# Patient Record
Sex: MALE | Race: WHITE | Employment: OTHER | URBAN - METROPOLITAN AREA
[De-identification: names, ages, dates, MRNs, and addresses within clinical notes are randomized per-mention and may not be internally consistent; named-entity substitution may affect disease eponyms.]

---

## 2018-01-29 ENCOUNTER — HOSPITAL ENCOUNTER (OUTPATIENT)
Dept: MRI IMAGING | Age: 83
Discharge: HOME | End: 2018-01-29
Attending: OTOLARYNGOLOGY
Payer: MEDICARE

## 2018-01-29 LAB — CREAT BLD-MCNC: 0.9 MG/DL (ref 0.8–1.5)

## 2018-01-29 PROCEDURE — 82565 ASSAY OF CREATININE: CPT

## 2018-01-29 PROCEDURE — 70553 MRI BRAIN STEM W/O & W/DYE: CPT

## 2018-01-29 PROCEDURE — A9577 INJ MULTIHANCE: HCPCS | Performed by: OTOLARYNGOLOGY

## 2018-01-29 PROCEDURE — 74011250636 HC RX REV CODE- 250/636: Performed by: OTOLARYNGOLOGY

## 2018-01-29 RX ORDER — SODIUM CHLORIDE 0.9 % (FLUSH) 0.9 %
10 SYRINGE (ML) INJECTION
Status: COMPLETED | OUTPATIENT
Start: 2018-01-29 | End: 2018-01-29

## 2018-01-29 RX ADMIN — GADOBENATE DIMEGLUMINE 15 ML: 529 INJECTION, SOLUTION INTRAVENOUS at 15:22

## 2018-01-29 RX ADMIN — Medication 10 ML: at 15:22

## 2020-09-01 ENCOUNTER — APPOINTMENT (RX ONLY)
Dept: URBAN - METROPOLITAN AREA CLINIC 349 | Facility: CLINIC | Age: 85
Setting detail: DERMATOLOGY
End: 2020-09-01

## 2020-09-01 DIAGNOSIS — D22 MELANOCYTIC NEVI: ICD-10-CM

## 2020-09-01 DIAGNOSIS — L81.4 OTHER MELANIN HYPERPIGMENTATION: ICD-10-CM

## 2020-09-01 PROBLEM — C44.219 BASAL CELL CARCINOMA OF SKIN OF LEFT EAR AND EXTERNAL AURICULAR CANAL: Status: ACTIVE | Noted: 2020-09-01

## 2020-09-01 PROBLEM — C44.529 SQUAMOUS CELL CARCINOMA OF SKIN OF OTHER PART OF TRUNK: Status: ACTIVE | Noted: 2020-09-01

## 2020-09-01 PROBLEM — D22.5 MELANOCYTIC NEVI OF TRUNK: Status: ACTIVE | Noted: 2020-09-01

## 2020-09-01 PROBLEM — D22.4 MELANOCYTIC NEVI OF SCALP AND NECK: Status: ACTIVE | Noted: 2020-09-01

## 2020-09-01 PROCEDURE — ? REFERRAL CORRESPONDENCE

## 2020-09-01 PROCEDURE — 99214 OFFICE O/P EST MOD 30 MIN: CPT | Mod: 25

## 2020-09-01 PROCEDURE — 17282 DSTR MAL LS F/E/E/N/L/M1.1-2: CPT

## 2020-09-01 PROCEDURE — 17263 DSTRJ MAL LES T/A/L 2.1-3.0: CPT

## 2020-09-01 PROCEDURE — ? COUNSELING

## 2020-09-01 PROCEDURE — ? PATHOLOGY BILLING

## 2020-09-01 PROCEDURE — ? CURETTAGE AND DESTRUCTION WITH PATHOLOGY

## 2020-09-01 PROCEDURE — 88305 TISSUE EXAM BY PATHOLOGIST: CPT

## 2020-09-01 PROCEDURE — A4550 SURGICAL TRAYS: HCPCS

## 2020-09-01 ASSESSMENT — LOCATION ZONE DERM
LOCATION ZONE: FACE
LOCATION ZONE: FACE
LOCATION ZONE: TRUNK
LOCATION ZONE: NECK
LOCATION ZONE: TRUNK

## 2020-09-01 ASSESSMENT — LOCATION SIMPLE DESCRIPTION DERM
LOCATION SIMPLE: LEFT FOREHEAD
LOCATION SIMPLE: RIGHT UPPER BACK
LOCATION SIMPLE: ABDOMEN
LOCATION SIMPLE: POSTERIOR NECK
LOCATION SIMPLE: LEFT UPPER BACK
LOCATION SIMPLE: CHEST
LOCATION SIMPLE: LEFT FOREHEAD
LOCATION SIMPLE: LEFT UPPER BACK

## 2020-09-01 ASSESSMENT — LOCATION DETAILED DESCRIPTION DERM
LOCATION DETAILED: LEFT SUPERIOR UPPER BACK
LOCATION DETAILED: LEFT FOREHEAD
LOCATION DETAILED: PERIUMBILICAL SKIN
LOCATION DETAILED: RIGHT POSTERIOR NECK
LOCATION DETAILED: LEFT SUPERIOR UPPER BACK
LOCATION DETAILED: LEFT FOREHEAD
LOCATION DETAILED: LEFT MEDIAL SUPERIOR CHEST
LOCATION DETAILED: RIGHT SUPERIOR UPPER BACK

## 2020-09-01 NOTE — PROCEDURE: CURETTAGE AND DESTRUCTION WITH PATHOLOGY
Size Of Lesion After Curettage: 2.2
Anesthesia Volume In Cc: 1.5
Bill For Surgical Tray: yes
Consent was obtained from the patient. The risks, benefits and alternatives to therapy were discussed in detail. Specifically, the risks of infection, scarring, bleeding, prolonged wound healing, nerve injury, incomplete removal, allergy to anesthesia and recurrence were addressed. Alternatives to ED&C, such as: surgical removal and XRT were also discussed.  Prior to the procedure, the treatment site was clearly identified and confirmed by the patient. All components of Universal Protocol/PAUSE Rule completed.
Bill As A Line Item Or As Units: Line Item
Size Of Lesion In Cm: 1.1
Detail Level: Detailed
Number Of Curettages: 3
What Was Performed First?: Curettage
Accession #: [email or web address]
Post-Care Instructions: I reviewed with the patient in detail post-care instructions. Patient is to keep the area dry for 48 hours, and not to engage in any swimming until the area is healed. Should the patient develop any fevers, chills, bleeding, severe pain patient will contact the office immediately.
Size Of Lesion In Cm: 2
Additional Information: (Optional): The wound was cleaned, and a pressure dressing was applied.  The patient received detailed post-op instructions.\\nAft the procedure, the patient was observed for 5-10 minutes and was oriented to person, place, and time.  Denied feeling dizzy, queasy, and stated that they did not feel that they were going to faint.
Bill 37035 For Specimen Handling/Conveyance To Laboratory?: no
Anesthesia Type: 1% lidocaine with epinephrine
Biopsy Type: H and E
Billing Type: Third-Party Bill
Cautery Type: hot cautery
Size Of Lesion After Curettage: 1.3
Histology Text: Following the procedure a portion of the curetted material was sent for histologic evaluation.

## 2020-09-01 NOTE — PROCEDURE: PATHOLOGY BILLING
Immunohistochemistry ([id number] and [id number]) billing is not performed here. Please use the Immunohistochemistry Stain Billing plan to accomplish this.

## 2020-09-01 NOTE — PROCEDURE: PATHOLOGY BILLING
Immunohistochemistry ([id number] and [id number]) billing is not performed here. Please use the Immunohistochemistry Stain Billing plan to accomplish this. Immunohistochemistry ([id number] and [id number]) billing is not performed here. Please use the Immunohistochemistry Stain Billing plan to accomplish this.

## 2020-10-13 ENCOUNTER — APPOINTMENT (RX ONLY)
Dept: URBAN - METROPOLITAN AREA CLINIC 349 | Facility: CLINIC | Age: 85
Setting detail: DERMATOLOGY
End: 2020-10-13

## 2020-10-13 DIAGNOSIS — L81.4 OTHER MELANIN HYPERPIGMENTATION: ICD-10-CM

## 2020-10-13 DIAGNOSIS — L57.0 ACTINIC KERATOSIS: ICD-10-CM

## 2020-10-13 DIAGNOSIS — D22 MELANOCYTIC NEVI: ICD-10-CM

## 2020-10-13 PROBLEM — C44.219 BASAL CELL CARCINOMA OF SKIN OF LEFT EAR AND EXTERNAL AURICULAR CANAL: Status: ACTIVE | Noted: 2020-10-13

## 2020-10-13 PROBLEM — D04.39 CARCINOMA IN SITU OF SKIN OF OTHER PARTS OF FACE: Status: ACTIVE | Noted: 2020-10-13

## 2020-10-13 PROBLEM — D22.5 MELANOCYTIC NEVI OF TRUNK: Status: ACTIVE | Noted: 2020-10-13

## 2020-10-13 PROBLEM — C44.529 SQUAMOUS CELL CARCINOMA OF SKIN OF OTHER PART OF TRUNK: Status: ACTIVE | Noted: 2020-10-13

## 2020-10-13 PROCEDURE — ? BIOPSY BY SHAVE METHOD

## 2020-10-13 PROCEDURE — 11103 TANGNTL BX SKIN EA SEP/ADDL: CPT

## 2020-10-13 PROCEDURE — ? COUNSELING

## 2020-10-13 PROCEDURE — A4550 SURGICAL TRAYS: HCPCS

## 2020-10-13 PROCEDURE — 11102 TANGNTL BX SKIN SINGLE LES: CPT | Mod: 59

## 2020-10-13 PROCEDURE — ? OTHER

## 2020-10-13 PROCEDURE — ? PATHOLOGY BILLING

## 2020-10-13 PROCEDURE — 99214 OFFICE O/P EST MOD 30 MIN: CPT | Mod: 25

## 2020-10-13 PROCEDURE — ? CURETTAGE AND DESTRUCTION WITH PATHOLOGY

## 2020-10-13 PROCEDURE — 17281 DSTR MAL LS F/E/E/N/L/M .6-1: CPT

## 2020-10-13 PROCEDURE — 88305 TISSUE EXAM BY PATHOLOGIST: CPT

## 2020-10-13 ASSESSMENT — LOCATION ZONE DERM
LOCATION ZONE: ARM
LOCATION ZONE: NECK
LOCATION ZONE: NECK
LOCATION ZONE: TRUNK

## 2020-10-13 ASSESSMENT — LOCATION SIMPLE DESCRIPTION DERM
LOCATION SIMPLE: POSTERIOR NECK
LOCATION SIMPLE: LEFT FOREARM
LOCATION SIMPLE: CHEST
LOCATION SIMPLE: POSTERIOR NECK
LOCATION SIMPLE: LEFT UPPER BACK
LOCATION SIMPLE: RIGHT FOREARM

## 2020-10-13 ASSESSMENT — LOCATION DETAILED DESCRIPTION DERM
LOCATION DETAILED: LEFT MEDIAL INFERIOR CHEST
LOCATION DETAILED: LEFT SUPERIOR MEDIAL UPPER BACK
LOCATION DETAILED: RIGHT DISTAL DORSAL FOREARM
LOCATION DETAILED: LEFT LATERAL TRAPEZIAL NECK
LOCATION DETAILED: LEFT LATERAL TRAPEZIAL NECK
LOCATION DETAILED: LEFT SUPERIOR UPPER BACK
LOCATION DETAILED: LEFT MEDIAL SUPERIOR CHEST
LOCATION DETAILED: LEFT PROXIMAL DORSAL FOREARM

## 2020-10-13 NOTE — PROCEDURE: PATHOLOGY BILLING
Immunohistochemistry ([id number] and [id number]) billing is not performed here. Please use the Immunohistochemistry Stain Billing plan to accomplish this. Immunohistochemistry ([id number] and [id number]) billing is not performed here. Please use the Immunohistochemistry Stain Billing plan to accomplish this.

## 2020-10-13 NOTE — PROCEDURE: PATHOLOGY BILLING
Immunohistochemistry ([id number] and [id number]) billing is not performed here. Please use the Immunohistochemistry Stain Billing plan to accomplish this.

## 2020-10-13 NOTE — PROCEDURE: OTHER
Other (Free Text): Will treat at follow up
Note Text (......Xxx Chief Complaint.): This diagnosis correlates with the
Detail Level: Detailed

## 2020-10-13 NOTE — PROCEDURE: BIOPSY BY SHAVE METHOD
Consent: Written consent was obtained and risks were reviewed including but not limited to scarring, infection, bleeding, scabbing, incomplete removal, nerve damage and allergy to anesthesia.
X Size Of Lesion In Cm: 0
Was A Bandage Applied: Yes
Cryotherapy Text: The wound bed was treated with cryotherapy after the biopsy was performed.
Billing Type: Third-Party Bill
Validate Triangulation: No
Wound Care: Vaseline
Biopsy Method: Personna blade
Type Of Destruction Used: Electrodesiccation
Notification Instructions: Patient will be notified of biopsy results. However, patient instructed to call the office if not contacted within 2 weeks.
Anesthesia Type: 1% lidocaine with 1:500,000 epinephrine and a 1:10 solution of 8.4% sodium bicarbonate
Accession #: [email or web address]
Electrodesiccation Text: The wound bed was treated with electrodesiccation after the biopsy was performed.
Post-Care Instructions: I reviewed with the patient in detail post-care instructions. Patient is to keep the biopsy site dry overnight, and then apply bacitracin twice daily until healed. Patient may apply hydrogen peroxide soaks to remove any crusting.\\nAft the procedure, the patient was observed for 5-10 minutes and was oriented to person, place, and time.  Denied feeling dizzy, queasy, and stated that they did not feel that they were going to faint.
Detail Level: Detailed
Dressing: bandage
Size Of Lesion In Cm: 0.8
Anesthesia Volume In Cc (Will Not Render If 0): 0.5
Information: Selecting Yes will display possible errors in your note based on the variables you have selected. This validation is only offered as a suggestion for you. PLEASE NOTE THAT THE VALIDATION TEXT WILL BE REMOVED WHEN YOU FINALIZE YOUR NOTE. IF YOU WANT TO FAX A PRELIMINARY NOTE YOU WILL NEED TO TOGGLE THIS TO 'NO' IF YOU DO NOT WANT IT IN YOUR FAXED NOTE.
Depth Of Biopsy: dermis
Hemostasis: Aluminum Chloride
Biopsy Type: H and E

## 2020-10-13 NOTE — PROCEDURE: PATHOLOGY BILLING
Immunohistochemistry ([id number] and [id number]) billing is not performed here. Please use the Immunohistochemistry Stain Billing plan to accomplish this.

## 2020-10-13 NOTE — PROCEDURE: CURETTAGE AND DESTRUCTION WITH PATHOLOGY
Anesthesia Type: 1% lidocaine with epinephrine
Biopsy Type: H and E
Billing Type: Third-Party Bill
Cautery Type: hot cautery
Render Path Notes In Note?: No
Histology Text: Following the procedure a portion of the curetted material was sent for histologic evaluation.
Anesthesia Volume In Cc: 1.5
Detail Level: Detailed
What Was Performed First?: Curettage
Size Of Lesion After Curettage: 0.9
Consent was obtained from the patient. The risks, benefits and alternatives to therapy were discussed in detail. Specifically, the risks of infection, scarring, bleeding, prolonged wound healing, nerve injury, incomplete removal, allergy to anesthesia and recurrence were addressed. Alternatives to ED&C, such as: surgical removal and XRT were also discussed.  Prior to the procedure, the treatment site was clearly identified and confirmed by the patient. All components of Universal Protocol/PAUSE Rule completed.
Bill For Surgical Tray: yes
Number Of Curettages: 3
Size Of Lesion In Cm: 0.8
Bill As A Line Item Or As Units: Line Item
Accession #: [email or web address]
Post-Care Instructions: I reviewed with the patient in detail post-care instructions. Patient is to keep the area dry for 48 hours, and not to engage in any swimming until the area is healed. Should the patient develop any fevers, chills, bleeding, severe pain patient will contact the office immediately.
Additional Information: (Optional): The wound was cleaned, and a pressure dressing was applied.  The patient received detailed post-op instructions.\\nAft the procedure, the patient was observed for 5-10 minutes and was oriented to person, place, and time.  Denied feeling dizzy, queasy, and stated that they did not feel that they were going to faint.

## 2020-11-16 ENCOUNTER — APPOINTMENT (RX ONLY)
Dept: URBAN - METROPOLITAN AREA CLINIC 349 | Facility: CLINIC | Age: 85
Setting detail: DERMATOLOGY
End: 2020-11-16

## 2020-11-16 DIAGNOSIS — L90.5 SCAR CONDITIONS AND FIBROSIS OF SKIN: ICD-10-CM

## 2020-11-16 PROCEDURE — 11603 EXC TR-EXT MAL+MARG 2.1-3 CM: CPT

## 2020-11-16 PROCEDURE — ? PATHOLOGY BILLING

## 2020-11-16 PROCEDURE — ? EXCISION

## 2020-11-16 PROCEDURE — A4550 SURGICAL TRAYS: HCPCS

## 2020-11-16 PROCEDURE — 88305 TISSUE EXAM BY PATHOLOGIST: CPT

## 2020-11-16 PROCEDURE — 12032 INTMD RPR S/A/T/EXT 2.6-7.5: CPT

## 2020-11-16 ASSESSMENT — LOCATION DETAILED DESCRIPTION DERM
LOCATION DETAILED: RIGHT MEDIAL SUPERIOR CHEST
LOCATION DETAILED: RIGHT MEDIAL SUPERIOR CHEST

## 2020-11-16 ASSESSMENT — LOCATION ZONE DERM
LOCATION ZONE: TRUNK
LOCATION ZONE: TRUNK

## 2020-11-16 ASSESSMENT — LOCATION SIMPLE DESCRIPTION DERM
LOCATION SIMPLE: CHEST
LOCATION SIMPLE: CHEST

## 2020-11-16 NOTE — PROCEDURE: EXCISION
Complex Repair And Dorsal Nasal Flap Text: The defect edges were debeveled with a #15 scalpel blade.  The primary defect was closed partially with a complex linear closure.  Given the location of the remaining defect, shape of the defect and the proximity to free margins a dorsal nasal flap was deemed most appropriate for complete closure of the defect.  Using a sterile surgical marker, an appropriate flap was drawn incorporating the defect and placing the expected incisions within the relaxed skin tension lines where possible.    The area thus outlined was incised deep to adipose tissue with a #15 scalpel blade.  The skin margins were undermined to an appropriate distance in all directions utilizing iris scissors.
Paramedian Forehead Flap Text: A decision was made to reconstruct the defect utilizing an interpolation axial flap and a staged reconstruction.  A telfa template was made of the defect.  This telfa template was then used to outline the paramedian forehead pedicle flap.  The donor area for the pedicle flap was then injected with anesthesia.  The flap was excised through the skin and subcutaneous tissue down to the layer of the underlying musculature.  The pedicle flap was carefully excised within this deep plane to maintain its blood supply.  The edges of the donor site were undermined.   The donor site was closed in a primary fashion.  The pedicle was then rotated into position and sutured.  Once the tube was sutured into place, adequate blood supply was confirmed with blanching and refill.  The pedicle was then wrapped with xeroform gauze and dressed appropriately with a telfa and gauze bandage to ensure continued blood supply and protect the attached pedicle.
Include Suturegard In Note?: No
Epidermal Closure: running locked
Partial Purse String (Intermediate) Text: Given the location of the defect and the characteristics of the surrounding skin an intermediate purse string closure was deemed most appropriate.  Undermining was performed circumferentially around the surgical defect.  A purse string suture was then placed and tightened. Wound tension of the circular defect prevented complete closure of the wound.
No Repair - Repaired With Adjacent Surgical Defect Text (Leave Blank If You Do Not Want): After the excision the defect was repaired concurrently with another surgical defect which was in close approximation.
Show Accession Variable: Yes
Nostril Rim Text: The closure involved the nostril rim.
H Plasty Text: Given the location of the defect, shape of the defect and the proximity to free margins a H-plasty was deemed most appropriate for repair.  Using a sterile surgical marker, the appropriate advancement arms of the H-plasty were drawn incorporating the defect and placing the expected incisions within the relaxed skin tension lines where possible. The area thus outlined was incised deep to adipose tissue with a #15 scalpel blade. The skin margins were undermined to an appropriate distance in all directions utilizing iris scissors.  The opposing advancement arms were then advanced into place in opposite direction and anchored with interrupted buried subcutaneous sutures.
O-T Plasty Text: The defect edges were debeveled with a #15 scalpel blade.  Given the location of the defect, shape of the defect and the proximity to free margins an O-T plasty was deemed most appropriate.  Using a sterile surgical marker, an appropriate O-T plasty was drawn incorporating the defect and placing the expected incisions within the relaxed skin tension lines where possible.    The area thus outlined was incised deep to adipose tissue with a #15 scalpel blade.  The skin margins were undermined to an appropriate distance in all directions utilizing iris scissors.
Complex Repair And Xenograft Text: The defect edges were debeveled with a #15 scalpel blade.  The primary defect was closed partially with a complex linear closure.  Given the location of the defect, shape of the defect and the proximity to free margins a xenograft was deemed most appropriate to repair the remaining defect.  The graft was trimmed to fit the size of the remaining defect.  The graft was then placed in the primary defect, oriented appropriately, and sutured into place.
O-Z Flap Text: The defect edges were debeveled with a #15 scalpel blade.  Given the location of the defect, shape of the defect and the proximity to free margins an O-Z flap was deemed most appropriate.  Using a sterile surgical marker, an appropriate transposition flap was drawn incorporating the defect and placing the expected incisions within the relaxed skin tension lines where possible. The area thus outlined was incised deep to adipose tissue with a #15 scalpel blade.  The skin margins were undermined to an appropriate distance in all directions utilizing iris scissors.
Length To Time In Minutes Device Was In Place: 10
Composite Graft Text: The defect edges were debeveled with a #15 scalpel blade.  Given the location of the defect, shape of the defect, the proximity to free margins and the fact the defect was full thickness a composite graft was deemed most appropriate.  The defect was outline and then transferred to the donor site.  A full thickness graft was then excised from the donor site. The graft was then placed in the primary defect, oriented appropriately and then sutured into place.  The secondary defect was then repaired using a primary closure.
Island Pedicle Flap Text: The defect edges were debeveled with a #15 scalpel blade.  Given the location of the defect, shape of the defect and the proximity to free margins an island pedicle advancement flap was deemed most appropriate.  Using a sterile surgical marker, an appropriate advancement flap was drawn incorporating the defect, outlining the appropriate donor tissue and placing the expected incisions within the relaxed skin tension lines where possible.    The area thus outlined was incised deep to adipose tissue with a #15 scalpel blade.  The skin margins were undermined to an appropriate distance in all directions around the primary defect and laterally outward around the island pedicle utilizing iris scissors.  There was minimal undermining beneath the pedicle flap.
Advancement Flap (Double) Text: The defect edges were debeveled with a #15 scalpel blade.  Given the location of the defect and the proximity to free margins a double advancement flap was deemed most appropriate.  Using a sterile surgical marker, the appropriate advancement flaps were drawn incorporating the defect and placing the expected incisions within the relaxed skin tension lines where possible.    The area thus outlined was incised deep to adipose tissue with a #15 scalpel blade.  The skin margins were undermined to an appropriate distance in all directions utilizing iris scissors.
Complex Repair And Rhombic Flap Text: The defect edges were debeveled with a #15 scalpel blade.  The primary defect was closed partially with a complex linear closure.  Given the location of the remaining defect, shape of the defect and the proximity to free margins a rhombic flap was deemed most appropriate for complete closure of the defect.  Using a sterile surgical marker, an appropriate advancement flap was drawn incorporating the defect and placing the expected incisions within the relaxed skin tension lines where possible.    The area thus outlined was incised deep to adipose tissue with a #15 scalpel blade.  The skin margins were undermined to an appropriate distance in all directions utilizing iris scissors.
Purse String (Intermediate) Text: Given the location of the defect and the characteristics of the surrounding skin a pursestring intermediate closure was deemed most appropriate.  Undermining was performed circumfirentially around the surgical defect.  A purstring suture was then placed and tightened.
Slit Excision Additional Text (Leave Blank If You Do Not Want): A linear line was drawn on the skin overlying the lesion. An incision was made slowly until the lesion was visualized.  Once visualized, the lesion was removed with blunt dissection.
Lazy S Complex Repair Preamble Text (Leave Blank If You Do Not Want): Extensive wide undermining was performed.
Primary Defect Width (In Cm): 0
Split-Thickness Skin Graft Text: The defect edges were debeveled with a #15 scalpel blade.  Given the location of the defect, shape of the defect and the proximity to free margins a split thickness skin graft was deemed most appropriate.  Using a sterile surgical marker, the primary defect shape was transferred to the donor site. The split thickness graft was then harvested.  The skin graft was then placed in the primary defect and oriented appropriately.
Hemostasis: Electrocautery
Tissue Cultured Epidermal Autograft Text: The defect edges were debeveled with a #15 scalpel blade.  Given the location of the defect, shape of the defect and the proximity to free margins a tissue cultured epidermal autograft was deemed most appropriate.  The graft was then trimmed to fit the size of the defect.  The graft was then placed in the primary defect and oriented appropriately.
Rotation Flap Text: The defect edges were debeveled with a #15 scalpel blade.  Given the location of the defect, shape of the defect and the proximity to free margins a rotation flap was deemed most appropriate.  Using a sterile surgical marker, an appropriate rotation flap was drawn incorporating the defect and placing the expected incisions within the relaxed skin tension lines where possible.    The area thus outlined was incised deep to adipose tissue with a #15 scalpel blade.  The skin margins were undermined to an appropriate distance in all directions utilizing iris scissors.
Complex Repair And Tissue Cultured Epidermal Autograft Text: The defect edges were debeveled with a #15 scalpel blade.  The primary defect was closed partially with a complex linear closure.  Given the location of the defect, shape of the defect and the proximity to free margins an tissue cultured epidermal autograft was deemed most appropriate to repair the remaining defect.  The graft was trimmed to fit the size of the remaining defect.  The graft was then placed in the primary defect, oriented appropriately, and sutured into place.
Hemigard Intro: Due to skin fragility and wound tension, it was decided to use HEMIGARD adhesive retention suture devices to permit a linear closure. The skin was cleaned and dried for a 6cm distance away from the wound. Excessive hair, if present, was removed to allow for adhesion.
Home Suture Removal Text: Patient was provided a home suture removal kit and will remove their sutures at home.  If they have any questions or difficulties they will call the office.
Lip Wedge Excision Repair Text: Given the location of the defect and the proximity to free margins a full thickness wedge repair was deemed most appropriate.  Using a sterile surgical marker, the appropriate repair was drawn incorporating the defect and placing the expected incisions perpendicular to the vermilion border.  The vermilion border was also meticulously outlined to ensure appropriate reapproximation during the repair.  The area thus outlined was incised through and through with a #15 scalpel blade.  The muscularis and dermis were reaproximated with deep sutures following hemostasis. Care was taken to realign the vermilion border before proceeding with the superficial closure.  Once the vermilion was realigned the superfical and mucosal closure was finished.
Consent was obtained from the patient. The risks and benefits to therapy were discussed in detail. Specifically, the risks of infection, scarring, bleeding, prolonged wound healing, incomplete removal, allergy to anesthesia, nerve injury and recurrence were addressed. Prior to the procedure, the treatment site was clearly identified and confirmed by the patient. All components of Universal Protocol/PAUSE Rule completed. Patient was offered ED&C, however she declines and prefers the surgical excision.
Undermining Type: Entire Wound
Bi-Rhombic Flap Text: The defect edges were debeveled with a #15 scalpel blade.  Given the location of the defect and the proximity to free margins a bi-rhombic flap was deemed most appropriate.  Using a sterile surgical marker, an appropriate rhombic flap was drawn incorporating the defect. The area thus outlined was incised deep to adipose tissue with a #15 scalpel blade.  The skin margins were undermined to an appropriate distance in all directions utilizing iris scissors.
Positioning (Leave Blank If You Do Not Want): The patient was placed in a comfortable position exposing the surgical site.
V-Y Plasty Text: The defect edges were debeveled with a #15 scalpel blade.  Given the location of the defect, shape of the defect and the proximity to free margins an V-Y advancement flap was deemed most appropriate.  Using a sterile surgical marker, an appropriate advancement flap was drawn incorporating the defect and placing the expected incisions within the relaxed skin tension lines where possible.    The area thus outlined was incised deep to adipose tissue with a #15 scalpel blade.  The skin margins were undermined to an appropriate distance in all directions utilizing iris scissors.
Saucerization Excision Additional Text (Leave Blank If You Do Not Want): The margin was drawn around the clinically apparent lesion.  Incisions were then made along these lines, in a tangential fashion, to the appropriate tissue plane and the lesion was extirpated.
Complex Repair And O-T Advancement Flap Text: The defect edges were debeveled with a #15 scalpel blade.  The primary defect was closed partially with a complex linear closure.  Given the location of the remaining defect, shape of the defect and the proximity to free margins an O-T advancement flap was deemed most appropriate for complete closure of the defect.  Using a sterile surgical marker, an appropriate advancement flap was drawn incorporating the defect and placing the expected incisions within the relaxed skin tension lines where possible.    The area thus outlined was incised deep to adipose tissue with a #15 scalpel blade.  The skin margins were undermined to an appropriate distance in all directions utilizing iris scissors.
Hatchet Flap Text: The defect edges were debeveled with a #15 scalpel blade.  Given the location of the defect, shape of the defect and the proximity to free margins a hatchet flap was deemed most appropriate.  Using a sterile surgical marker, an appropriate hatchet flap was drawn incorporating the defect and placing the expected incisions within the relaxed skin tension lines where possible.    The area thus outlined was incised deep to adipose tissue with a #15 scalpel blade.  The skin margins were undermined to an appropriate distance in all directions utilizing iris scissors.
Complex Repair And Z Plasty Text: The defect edges were debeveled with a #15 scalpel blade.  The primary defect was closed partially with a complex linear closure.  Given the location of the remaining defect, shape of the defect and the proximity to free margins a Z plasty was deemed most appropriate for complete closure of the defect.  Using a sterile surgical marker, an appropriate advancement flap was drawn incorporating the defect and placing the expected incisions within the relaxed skin tension lines where possible.    The area thus outlined was incised deep to adipose tissue with a #15 scalpel blade.  The skin margins were undermined to an appropriate distance in all directions utilizing iris scissors.
Suture Removal: 12 days
Repair Performed By Another Provider Text (Leave Blank If You Do Not Want): After the tissue was excised the defect was repaired by another provider.
Hemigard Postcare Instructions: The HEMIGARD strips are to remain completely dry for at least 5-7 days.
Epidermal Closure Graft Donor Site (Optional): simple interrupted
Complex Repair And Dermal Autograft Text: The defect edges were debeveled with a #15 scalpel blade.  The primary defect was closed partially with a complex linear closure.  Given the location of the defect, shape of the defect and the proximity to free margins an dermal autograft was deemed most appropriate to repair the remaining defect.  The graft was trimmed to fit the size of the remaining defect.  The graft was then placed in the primary defect, oriented appropriately, and sutured into place.
Intermediate / Complex Repair - Final Wound Length In Cm: 5
Intermediate Repair Preamble Text (Leave Blank If You Do Not Want): Undermining was performed with blunt dissection.
Dorsal Nasal Flap Text: The defect edges were debeveled with a #15 scalpel blade.  Given the location of the defect and the proximity to free margins a dorsal nasal flap was deemed most appropriate.  Using a sterile surgical marker, an appropriate dorsal nasal flap was drawn around the defect.    The area thus outlined was incised deep to adipose tissue with a #15 scalpel blade.  The skin margins were undermined to an appropriate distance in all directions utilizing iris scissors.
Complex Repair And Rotation Flap Text: The defect edges were debeveled with a #15 scalpel blade.  The primary defect was closed partially with a complex linear closure.  Given the location of the remaining defect, shape of the defect and the proximity to free margins a rotation flap was deemed most appropriate for complete closure of the defect.  Using a sterile surgical marker, an appropriate advancement flap was drawn incorporating the defect and placing the expected incisions within the relaxed skin tension lines where possible.    The area thus outlined was incised deep to adipose tissue with a #15 scalpel blade.  The skin margins were undermined to an appropriate distance in all directions utilizing iris scissors.
Deep Sutures: 3-0 PDS
Excision Method: Elliptical
Cartilage Graft Text: The defect edges were debeveled with a #15 scalpel blade.  Given the location of the defect, shape of the defect, the fact the defect involved a full thickness cartilage defect a cartilage graft was deemed most appropriate.  An appropriate donor site was identified, cleansed, and anesthetized. The cartilage graft was then harvested and transferred to the recipient site, oriented appropriately and then sutured into place.  The secondary defect was then repaired using a primary closure.
Muscle Hinge Flap Text: The defect edges were debeveled with a #15 scalpel blade.  Given the size, depth and location of the defect and the proximity to free margins a muscle hinge flap was deemed most appropriate.  Using a sterile surgical marker, an appropriate hinge flap was drawn incorporating the defect. The area thus outlined was incised with a #15 scalpel blade.  The skin margins were undermined to an appropriate distance in all directions utilizing iris scissors.
Star Wedge Flap Text: The defect edges were debeveled with a #15 scalpel blade.  Given the location of the defect, shape of the defect and the proximity to free margins a star wedge flap was deemed most appropriate.  Using a sterile surgical marker, an appropriate rotation flap was drawn incorporating the defect and placing the expected incisions within the relaxed skin tension lines where possible. The area thus outlined was incised deep to adipose tissue with a #15 scalpel blade.  The skin margins were undermined to an appropriate distance in all directions utilizing iris scissors.
Complex Repair And W Plasty Text: The defect edges were debeveled with a #15 scalpel blade.  The primary defect was closed partially with a complex linear closure.  Given the location of the remaining defect, shape of the defect and the proximity to free margins a W plasty was deemed most appropriate for complete closure of the defect.  Using a sterile surgical marker, an appropriate advancement flap was drawn incorporating the defect and placing the expected incisions within the relaxed skin tension lines where possible.    The area thus outlined was incised deep to adipose tissue with a #15 scalpel blade.  The skin margins were undermined to an appropriate distance in all directions utilizing iris scissors.
Cheek-To-Nose Interpolation Flap Text: A decision was made to reconstruct the defect utilizing an interpolation axial flap and a staged reconstruction.  A telfa template was made of the defect.  This telfa template was then used to outline the Cheek-To-Nose Interpolation flap.  The donor area for the pedicle flap was then injected with anesthesia.  The flap was excised through the skin and subcutaneous tissue down to the layer of the underlying musculature.  The interpolation flap was carefully excised within this deep plane to maintain its blood supply.  The edges of the donor site were undermined.   The donor site was closed in a primary fashion.  The pedicle was then rotated into position and sutured.  Once the tube was sutured into place, adequate blood supply was confirmed with blanching and refill.  The pedicle was then wrapped with xeroform gauze and dressed appropriately with a telfa and gauze bandage to ensure continued blood supply and protect the attached pedicle.
Bilobed Transposition Flap Text: The defect edges were debeveled with a #15 scalpel blade.  Given the location of the defect and the proximity to free margins a bilobed transposition flap was deemed most appropriate.  Using a sterile surgical marker, an appropriate bilobe flap drawn around the defect.    The area thus outlined was incised deep to adipose tissue with a #15 scalpel blade.  The skin margins were undermined to an appropriate distance in all directions utilizing iris scissors.
Billing Type: Third-Party Bill
Post-Care Instructions: I reviewed with the patient in detail post-care instructions. Patient is not to engage in any heavy lifting, exercise, or swimming for the next 14 days. Should the patient develop any fevers, chills, bleeding, severe pain patient will contact the office immediately.
Dermal Autograft Text: The defect edges were debeveled with a #15 scalpel blade.  Given the location of the defect, shape of the defect and the proximity to free margins a dermal autograft was deemed most appropriate.  Using a sterile surgical marker, the primary defect shape was transferred to the donor site. The area thus outlined was incised deep to adipose tissue with a #15 scalpel blade.  The harvested graft was then trimmed of adipose and epidermal tissue until only dermis was left.  The skin graft was then placed in the primary defect and oriented appropriately.
Rhomboid Transposition Flap Text: The defect edges were debeveled with a #15 scalpel blade.  Given the location of the defect and the proximity to free margins a rhomboid transposition flap was deemed most appropriate.  Using a sterile surgical marker, an appropriate rhomboid flap was drawn incorporating the defect.    The area thus outlined was incised deep to adipose tissue with a #15 scalpel blade.  The skin margins were undermined to an appropriate distance in all directions utilizing iris scissors.
Size Of Margin In Cm: 0.1
Cheek Interpolation Flap Text: A decision was made to reconstruct the defect utilizing an interpolation axial flap and a staged reconstruction.  A telfa template was made of the defect.  This telfa template was then used to outline the Cheek Interpolation flap.  The donor area for the pedicle flap was then injected with anesthesia.  The flap was excised through the skin and subcutaneous tissue down to the layer of the underlying musculature.  The interpolation flap was carefully excised within this deep plane to maintain its blood supply.  The edges of the donor site were undermined.   The donor site was closed in a primary fashion.  The pedicle was then rotated into position and sutured.  Once the tube was sutured into place, adequate blood supply was confirmed with blanching and refill.  The pedicle was then wrapped with xeroform gauze and dressed appropriately with a telfa and gauze bandage to ensure continued blood supply and protect the attached pedicle.
Trilobed Flap Text: The defect edges were debeveled with a #15 scalpel blade.  Given the location of the defect and the proximity to free margins a trilobed flap was deemed most appropriate.  Using a sterile surgical marker, an appropriate trilobed flap drawn around the defect.    The area thus outlined was incised deep to adipose tissue with a #15 scalpel blade.  The skin margins were undermined to an appropriate distance in all directions utilizing iris scissors.
V-Y Flap Text: The defect edges were debeveled with a #15 scalpel blade.  Given the location of the defect, shape of the defect and the proximity to free margins a V-Y flap was deemed most appropriate.  Using a sterile surgical marker, an appropriate advancement flap was drawn incorporating the defect and placing the expected incisions within the relaxed skin tension lines where possible.    The area thus outlined was incised deep to adipose tissue with a #15 scalpel blade.  The skin margins were undermined to an appropriate distance in all directions utilizing iris scissors.
Estimated Blood Loss (Cc): minimal
Banner Transposition Flap Text: The defect edges were debeveled with a #15 scalpel blade.  Given the location of the defect and the proximity to free margins a Banner transposition flap was deemed most appropriate.  Using a sterile surgical marker, an appropriate flap drawn around the defect. The area thus outlined was incised deep to adipose tissue with a #15 scalpel blade.  The skin margins were undermined to an appropriate distance in all directions utilizing iris scissors.
Perilesional Excision Additional Text (Leave Blank If You Do Not Want): The margin was drawn around the clinically apparent lesion. Incisions were then made along these lines to the appropriate tissue plane and the lesion was extirpated.
Complex Repair And Melolabial Flap Text: The defect edges were debeveled with a #15 scalpel blade.  The primary defect was closed partially with a complex linear closure.  Given the location of the remaining defect, shape of the defect and the proximity to free margins a melolabial flap was deemed most appropriate for complete closure of the defect.  Using a sterile surgical marker, an appropriate advancement flap was drawn incorporating the defect and placing the expected incisions within the relaxed skin tension lines where possible.    The area thus outlined was incised deep to adipose tissue with a #15 scalpel blade.  The skin margins were undermined to an appropriate distance in all directions utilizing iris scissors.
Suturegard Retention Suture: 2-0 Nylon
Burow's Advancement Flap Text: The defect edges were debeveled with a #15 scalpel blade.  Given the location of the defect and the proximity to free margins a Burow's advancement flap was deemed most appropriate.  Using a sterile surgical marker, the appropriate advancement flap was drawn incorporating the defect and placing the expected incisions within the relaxed skin tension lines where possible.    The area thus outlined was incised deep to adipose tissue with a #15 scalpel blade.  The skin margins were undermined to an appropriate distance in all directions utilizing iris scissors.
Debridement Text: The wound edges were debrided prior to proceeding with the closure to facilitate wound healing.
Complex Repair And Double Advancement Flap Text: The defect edges were debeveled with a #15 scalpel blade.  The primary defect was closed partially with a complex linear closure.  Given the location of the remaining defect, shape of the defect and the proximity to free margins a double advancement flap was deemed most appropriate for complete closure of the defect.  Using a sterile surgical marker, an appropriate advancement flap was drawn incorporating the defect and placing the expected incisions within the relaxed skin tension lines where possible.    The area thus outlined was incised deep to adipose tissue with a #15 scalpel blade.  The skin margins were undermined to an appropriate distance in all directions utilizing iris scissors.
Complex Repair And Epidermal Autograft Text: The defect edges were debeveled with a #15 scalpel blade.  The primary defect was closed partially with a complex linear closure.  Given the location of the defect, shape of the defect and the proximity to free margins an epidermal autograft was deemed most appropriate to repair the remaining defect.  The graft was trimmed to fit the size of the remaining defect.  The graft was then placed in the primary defect, oriented appropriately, and sutured into place.
Bilateral Helical Rim Advancement Flap Text: The defect edges were debeveled with a #15 blade scalpel.  Given the location of the defect and the proximity to free margins (helical rim) a bilateral helical rim advancement flap was deemed most appropriate.  Using a sterile surgical marker, the appropriate advancement flaps were drawn incorporating the defect and placing the expected incisions between the helical rim and antihelix where possible.  The area thus outlined was incised through and through with a #15 scalpel blade.  With a skin hook and iris scissors, the flaps were gently and sharply undermined and freed up.
Detail Level: Detailed
Advancement Flap (Single) Text: The defect edges were debeveled with a #15 scalpel blade.  Given the location of the defect and the proximity to free margins a single advancement flap was deemed most appropriate.  Using a sterile surgical marker, an appropriate advancement flap was drawn incorporating the defect and placing the expected incisions within the relaxed skin tension lines where possible.    The area thus outlined was incised deep to adipose tissue with a #15 scalpel blade.  The skin margins were undermined to an appropriate distance in all directions utilizing iris scissors.
Curvilinear Excision Additional Text (Leave Blank If You Do Not Want): The margin was drawn around the clinically apparent lesion.  A curvilinear shape was then drawn on the skin incorporating the lesion and margins.  Incisions were then made along these lines to the appropriate tissue plane and the lesion was extirpated.
Melolabial Transposition Flap Text: The defect edges were debeveled with a #15 scalpel blade.  Given the location of the defect and the proximity to free margins a melolabial flap was deemed most appropriate.  Using a sterile surgical marker, an appropriate melolabial transposition flap was drawn incorporating the defect.    The area thus outlined was incised deep to adipose tissue with a #15 scalpel blade.  The skin margins were undermined to an appropriate distance in all directions utilizing iris scissors.
Mercedes Flap Text: The defect edges were debeveled with a #15 scalpel blade.  Given the location of the defect, shape of the defect and the proximity to free margins a Mercedes flap was deemed most appropriate.  Using a sterile surgical marker, an appropriate advancement flap was drawn incorporating the defect and placing the expected incisions within the relaxed skin tension lines where possible. The area thus outlined was incised deep to adipose tissue with a #15 scalpel blade.  The skin margins were undermined to an appropriate distance in all directions utilizing iris scissors.
Excisional Biopsy Additional Text (Leave Blank If You Do Not Want): The margin was drawn around the clinically apparent lesion. An elliptical shape was then drawn on the skin incorporating the lesion and margins.  Incisions were then made along these lines to the appropriate tissue plane and the lesion was extirpated.
Vermilion Border Text: The closure involved the vermilion border.
Complex Repair And Bilobe Flap Text: The defect edges were debeveled with a #15 scalpel blade.  The primary defect was closed partially with a complex linear closure.  Given the location of the remaining defect, shape of the defect and the proximity to free margins a bilobe flap was deemed most appropriate for complete closure of the defect.  Using a sterile surgical marker, an appropriate advancement flap was drawn incorporating the defect and placing the expected incisions within the relaxed skin tension lines where possible.    The area thus outlined was incised deep to adipose tissue with a #15 scalpel blade.  The skin margins were undermined to an appropriate distance in all directions utilizing iris scissors.
Spiral Flap Text: The defect edges were debeveled with a #15 scalpel blade.  Given the location of the defect, shape of the defect and the proximity to free margins a spiral flap was deemed most appropriate.  Using a sterile surgical marker, an appropriate rotation flap was drawn incorporating the defect and placing the expected incisions within the relaxed skin tension lines where possible. The area thus outlined was incised deep to adipose tissue with a #15 scalpel blade.  The skin margins were undermined to an appropriate distance in all directions utilizing iris scissors.
Double O-Z Flap Text: The defect edges were debeveled with a #15 scalpel blade.  Given the location of the defect, shape of the defect and the proximity to free margins a Double O-Z flap was deemed most appropriate.  Using a sterile surgical marker, an appropriate transposition flap was drawn incorporating the defect and placing the expected incisions within the relaxed skin tension lines where possible. The area thus outlined was incised deep to adipose tissue with a #15 scalpel blade.  The skin margins were undermined to an appropriate distance in all directions utilizing iris scissors.
Crescentic Advancement Flap Text: The defect edges were debeveled with a #15 scalpel blade.  Given the location of the defect and the proximity to free margins a crescentic advancement flap was deemed most appropriate.  Using a sterile surgical marker, the appropriate advancement flap was drawn incorporating the defect and placing the expected incisions within the relaxed skin tension lines where possible.    The area thus outlined was incised deep to adipose tissue with a #15 scalpel blade.  The skin margins were undermined to an appropriate distance in all directions utilizing iris scissors.
Wound Care: Vaseline
Complex Repair And Double M Plasty Text: The defect edges were debeveled with a #15 scalpel blade.  The primary defect was closed partially with a complex linear closure.  Given the location of the remaining defect, shape of the defect and the proximity to free margins a double M plasty was deemed most appropriate for complete closure of the defect.  Using a sterile surgical marker, an appropriate advancement flap was drawn incorporating the defect and placing the expected incisions within the relaxed skin tension lines where possible.    The area thus outlined was incised deep to adipose tissue with a #15 scalpel blade.  The skin margins were undermined to an appropriate distance in all directions utilizing iris scissors.
Partial Purse String (Simple) Text: Given the location of the defect and the characteristics of the surrounding skin a simple purse string closure was deemed most appropriate.  Undermining was performed circumferentially around the surgical defect.  A purse string suture was then placed and tightened. Wound tension of the circular defect prevented complete closure of the wound.
Zygomaticofacial Flap Text: Given the location of the defect, shape of the defect and the proximity to free margins a zygomaticofacial flap was deemed most appropriate for repair.  Using a sterile surgical marker, the appropriate flap was drawn incorporating the defect and placing the expected incisions within the relaxed skin tension lines where possible. The area thus outlined was incised deep to adipose tissue with a #15 scalpel blade with preservation of a vascular pedicle.  The skin margins were undermined to an appropriate distance in all directions utilizing iris scissors.  The flap was then placed into the defect and anchored with interrupted buried subcutaneous sutures.
Double Island Pedicle Flap Text: The defect edges were debeveled with a #15 scalpel blade.  Given the location of the defect, shape of the defect and the proximity to free margins a double island pedicle advancement flap was deemed most appropriate.  Using a sterile surgical marker, an appropriate advancement flap was drawn incorporating the defect, outlining the appropriate donor tissue and placing the expected incisions within the relaxed skin tension lines where possible.    The area thus outlined was incised deep to adipose tissue with a #15 scalpel blade.  The skin margins were undermined to an appropriate distance in all directions around the primary defect and laterally outward around the island pedicle utilizing iris scissors.  There was minimal undermining beneath the pedicle flap.
Ftsg Text: The defect edges were debeveled with a #15 scalpel blade.  Given the location of the defect, shape of the defect and the proximity to free margins a full thickness skin graft was deemed most appropriate.  Using a sterile surgical marker, the primary defect shape was transferred to the donor site. The area thus outlined was incised deep to adipose tissue with a #15 scalpel blade.  The harvested graft was then trimmed of adipose tissue until only dermis and epidermis was left.  The skin margins of the secondary defect were undermined to an appropriate distance in all directions utilizing iris scissors.  The secondary defect was closed with interrupted buried subcutaneous sutures.  The skin edges were then re-apposed with running  sutures.  The skin graft was then placed in the primary defect and oriented appropriately.
Excision Depth: adipose tissue
Complex Repair And Split-Thickness Skin Graft Text: The defect edges were debeveled with a #15 scalpel blade.  The primary defect was closed partially with a complex linear closure.  Given the location of the defect, shape of the defect and the proximity to free margins a split thickness skin graft was deemed most appropriate to repair the remaining defect.  The graft was trimmed to fit the size of the remaining defect.  The graft was then placed in the primary defect, oriented appropriately, and sutured into place.
Helical Rim Advancement Flap Text: The defect edges were debeveled with a #15 blade scalpel.  Given the location of the defect and the proximity to free margins (helical rim) a double helical rim advancement flap was deemed most appropriate.  Using a sterile surgical marker, the appropriate advancement flaps were drawn incorporating the defect and placing the expected incisions between the helical rim and antihelix where possible.  The area thus outlined was incised through and through with a #15 scalpel blade.  With a skin hook and iris scissors, the flaps were gently and sharply undermined and freed up.
Complex Repair And Modified Advancement Flap Text: The defect edges were debeveled with a #15 scalpel blade.  The primary defect was closed partially with a complex linear closure.  Given the location of the remaining defect, shape of the defect and the proximity to free margins a modified advancement flap was deemed most appropriate for complete closure of the defect.  Using a sterile surgical marker, an appropriate advancement flap was drawn incorporating the defect and placing the expected incisions within the relaxed skin tension lines where possible.    The area thus outlined was incised deep to adipose tissue with a #15 scalpel blade.  The skin margins were undermined to an appropriate distance in all directions utilizing iris scissors.
Posterior Auricular Interpolation Flap Text: A decision was made to reconstruct the defect utilizing an interpolation axial flap and a staged reconstruction.  A telfa template was made of the defect.  This telfa template was then used to outline the posterior auricular interpolation flap.  The donor area for the pedicle flap was then injected with anesthesia.  The flap was excised through the skin and subcutaneous tissue down to the layer of the underlying musculature.  The pedicle flap was carefully excised within this deep plane to maintain its blood supply.  The edges of the donor site were undermined.   The donor site was closed in a primary fashion.  The pedicle was then rotated into position and sutured.  Once the tube was sutured into place, adequate blood supply was confirmed with blanching and refill.  The pedicle was then wrapped with xeroform gauze and dressed appropriately with a telfa and gauze bandage to ensure continued blood supply and protect the attached pedicle.
Island Pedicle Flap With Canthal Suspension Text: The defect edges were debeveled with a #15 scalpel blade.  Given the location of the defect, shape of the defect and the proximity to free margins an island pedicle advancement flap was deemed most appropriate.  Using a sterile surgical marker, an appropriate advancement flap was drawn incorporating the defect, outlining the appropriate donor tissue and placing the expected incisions within the relaxed skin tension lines where possible. The area thus outlined was incised deep to adipose tissue with a #15 scalpel blade.  The skin margins were undermined to an appropriate distance in all directions around the primary defect and laterally outward around the island pedicle utilizing iris scissors.  There was minimal undermining beneath the pedicle flap. A suspension suture was placed in the canthal tendon to prevent tension and prevent ectropion.
Anesthesia Volume In Cc: 9.5
Alar Island Pedicle Flap Text: The defect edges were debeveled with a #15 scalpel blade.  Given the location of the defect, shape of the defect and the proximity to the alar rim an island pedicle advancement flap was deemed most appropriate.  Using a sterile surgical marker, an appropriate advancement flap was drawn incorporating the defect, outlining the appropriate donor tissue and placing the expected incisions within the nasal ala running parallel to the alar rim. The area thus outlined was incised with a #15 scalpel blade.  The skin margins were undermined minimally to an appropriate distance in all directions around the primary defect and laterally outward around the island pedicle utilizing iris scissors.  There was minimal undermining beneath the pedicle flap.
O-Z Plasty Text: The defect edges were debeveled with a #15 scalpel blade.  Given the location of the defect, shape of the defect and the proximity to free margins an O-Z plasty (double transposition flap) was deemed most appropriate.  Using a sterile surgical marker, the appropriate transposition flaps were drawn incorporating the defect and placing the expected incisions within the relaxed skin tension lines where possible.    The area thus outlined was incised deep to adipose tissue with a #15 scalpel blade.  The skin margins were undermined to an appropriate distance in all directions utilizing iris scissors.  Hemostasis was achieved with electrocautery.  The flaps were then transposed into place, one clockwise and the other counterclockwise, and anchored with interrupted buried subcutaneous sutures.
Anesthesia Volume In Cc: 2
W Plasty Text: The lesion was extirpated to the level of the fat with a #15 scalpel blade.  Given the location of the defect, shape of the defect and the proximity to free margins a W-plasty was deemed most appropriate for repair.  Using a sterile surgical marker, the appropriate transposition arms of the W-plasty were drawn incorporating the defect and placing the expected incisions within the relaxed skin tension lines where possible.    The area thus outlined was incised deep to adipose tissue with a #15 scalpel blade.  The skin margins were undermined to an appropriate distance in all directions utilizing iris scissors.  The opposing transposition arms were then transposed into place in opposite direction and anchored with interrupted buried subcutaneous sutures.
Retention Suture Text: Retention sutures were placed to support the closure and prevent dehiscence.
Melolabial Interpolation Flap Text: A decision was made to reconstruct the defect utilizing an interpolation axial flap and a staged reconstruction.  A telfa template was made of the defect.  This telfa template was then used to outline the melolabial interpolation flap.  The donor area for the pedicle flap was then injected with anesthesia.  The flap was excised through the skin and subcutaneous tissue down to the layer of the underlying musculature.  The pedicle flap was carefully excised within this deep plane to maintain its blood supply.  The edges of the donor site were undermined.   The donor site was closed in a primary fashion.  The pedicle was then rotated into position and sutured.  Once the tube was sutured into place, adequate blood supply was confirmed with blanching and refill.  The pedicle was then wrapped with xeroform gauze and dressed appropriately with a telfa and gauze bandage to ensure continued blood supply and protect the attached pedicle.
Complex Repair And A-T Advancement Flap Text: The defect edges were debeveled with a #15 scalpel blade.  The primary defect was closed partially with a complex linear closure.  Given the location of the remaining defect, shape of the defect and the proximity to free margins an A-T advancement flap was deemed most appropriate for complete closure of the defect.  Using a sterile surgical marker, an appropriate advancement flap was drawn incorporating the defect and placing the expected incisions within the relaxed skin tension lines where possible.    The area thus outlined was incised deep to adipose tissue with a #15 scalpel blade.  The skin margins were undermined to an appropriate distance in all directions utilizing iris scissors.
Accession #: [id number] [email or web address]
Complex Repair And M Plasty Text: The defect edges were debeveled with a #15 scalpel blade.  The primary defect was closed partially with a complex linear closure.  Given the location of the remaining defect, shape of the defect and the proximity to free margins an M plasty was deemed most appropriate for complete closure of the defect.  Using a sterile surgical marker, an appropriate advancement flap was drawn incorporating the defect and placing the expected incisions within the relaxed skin tension lines where possible.    The area thus outlined was incised deep to adipose tissue with a #15 scalpel blade.  The skin margins were undermined to an appropriate distance in all directions utilizing iris scissors.
A-T Advancement Flap Text: The defect edges were debeveled with a #15 scalpel blade.  Given the location of the defect, shape of the defect and the proximity to free margins an A-T advancement flap was deemed most appropriate.  Using a sterile surgical marker, an appropriate advancement flap was drawn incorporating the defect and placing the expected incisions within the relaxed skin tension lines where possible.    The area thus outlined was incised deep to adipose tissue with a #15 scalpel blade.  The skin margins were undermined to an appropriate distance in all directions utilizing iris scissors.
Keystone Flap Text: The defect edges were debeveled with a #15 scalpel blade.  Given the location of the defect, shape of the defect a keystone flap was deemed most appropriate.  Using a sterile surgical marker, an appropriate keystone flap was drawn incorporating the defect, outlining the appropriate donor tissue and placing the expected incisions within the relaxed skin tension lines where possible. The area thus outlined was incised deep to adipose tissue with a #15 scalpel blade.  The skin margins were undermined to an appropriate distance in all directions around the primary defect and laterally outward around the flap utilizing iris scissors.
Mastoid Interpolation Flap Text: A decision was made to reconstruct the defect utilizing an interpolation axial flap and a staged reconstruction.  A telfa template was made of the defect.  This telfa template was then used to outline the mastoid interpolation flap.  The donor area for the pedicle flap was then injected with anesthesia.  The flap was excised through the skin and subcutaneous tissue down to the layer of the underlying musculature.  The pedicle flap was carefully excised within this deep plane to maintain its blood supply.  The edges of the donor site were undermined.   The donor site was closed in a primary fashion.  The pedicle was then rotated into position and sutured.  Once the tube was sutured into place, adequate blood supply was confirmed with blanching and refill.  The pedicle was then wrapped with xeroform gauze and dressed appropriately with a telfa and gauze bandage to ensure continued blood supply and protect the attached pedicle.
Epidermal Sutures: 4-0 Prolene
Repair Type: Intermediate
Size Of Lesion In Cm: 2.5
Helical Rim Text: The closure involved the helical rim.
Rhombic Flap Text: The defect edges were debeveled with a #15 scalpel blade.  Given the location of the defect and the proximity to free margins a rhombic flap was deemed most appropriate.  Using a sterile surgical marker, an appropriate rhombic flap was drawn incorporating the defect.    The area thus outlined was incised deep to adipose tissue with a #15 scalpel blade.  The skin margins were undermined to an appropriate distance in all directions utilizing iris scissors.
Retention Suture Bite Size: 3 mm
Pre-Excision Curettage Text (Leave Blank If You Do Not Want): Prior to drawing the surgical margin the visible lesion was removed with electrodesiccation and curettage to clearly define the lesion size.
Suturegard Body: The suture ends were repeatedly re-tightened and re-clamped to achieve the desired tissue expansion.
Complex Repair And O-L Flap Text: The defect edges were debeveled with a #15 scalpel blade.  The primary defect was closed partially with a complex linear closure.  Given the location of the remaining defect, shape of the defect and the proximity to free margins an O-L flap was deemed most appropriate for complete closure of the defect.  Using a sterile surgical marker, an appropriate flap was drawn incorporating the defect and placing the expected incisions within the relaxed skin tension lines where possible.    The area thus outlined was incised deep to adipose tissue with a #15 scalpel blade.  The skin margins were undermined to an appropriate distance in all directions utilizing iris scissors.
Interpolation Flap Text: A decision was made to reconstruct the defect utilizing an interpolation axial flap and a staged reconstruction.  A telfa template was made of the defect.  This telfa template was then used to outline the interpolation flap.  The donor area for the pedicle flap was then injected with anesthesia.  The flap was excised through the skin and subcutaneous tissue down to the layer of the underlying musculature.  The interpolation flap was carefully excised within this deep plane to maintain its blood supply.  The edges of the donor site were undermined.   The donor site was closed in a primary fashion.  The pedicle was then rotated into position and sutured.  Once the tube was sutured into place, adequate blood supply was confirmed with blanching and refill.  The pedicle was then wrapped with xeroform gauze and dressed appropriately with a telfa and gauze bandage to ensure continued blood supply and protect the attached pedicle.
Complex Repair And V-Y Plasty Text: The defect edges were debeveled with a #15 scalpel blade.  The primary defect was closed partially with a complex linear closure.  Given the location of the remaining defect, shape of the defect and the proximity to free margins a V-Y plasty was deemed most appropriate for complete closure of the defect.  Using a sterile surgical marker, an appropriate advancement flap was drawn incorporating the defect and placing the expected incisions within the relaxed skin tension lines where possible.    The area thus outlined was incised deep to adipose tissue with a #15 scalpel blade.  The skin margins were undermined to an appropriate distance in all directions utilizing iris scissors.
Burow's Graft Text: The defect edges were debeveled with a #15 scalpel blade.  Given the location of the defect, shape of the defect, the proximity to free margins and the presence of a standing cone deformity a Burow's skin graft was deemed most appropriate. The standing cone was removed and this tissue was then trimmed to the shape of the primary defect. The adipose tissue was also removed until only dermis and epidermis were left.  The skin margins of the secondary defect were undermined to an appropriate distance in all directions utilizing iris scissors.  The secondary defect was closed with interrupted buried subcutaneous sutures.  The skin edges were then re-apposed with running  sutures.  The skin graft was then placed in the primary defect and oriented appropriately.
Scalpel Size: 15 blade
Complex Repair And Burow's Graft Text: The defect edges were debeveled with a #15 scalpel blade.  The primary defect was closed partially with a complex linear closure.  Given the location of the defect, shape of the defect, the proximity to free margins and the presence of a standing cone deformity a Burow's graft was deemed most appropriate to repair the remaining defect.  The graft was trimmed to fit the size of the remaining defect.  The graft was then placed in the primary defect, oriented appropriately, and sutured into place.
Advancement-Rotation Flap Text: The defect edges were debeveled with a #15 scalpel blade.  Given the location of the defect, shape of the defect and the proximity to free margins an advancement-rotation flap was deemed most appropriate.  Using a sterile surgical marker, an appropriate flap was drawn incorporating the defect and placing the expected incisions within the relaxed skin tension lines where possible. The area thus outlined was incised deep to adipose tissue with a #15 scalpel blade.  The skin margins were undermined to an appropriate distance in all directions utilizing iris scissors.
Elliptical Excision Additional Text (Leave Blank If You Do Not Want): The margin was drawn around the clinically apparent lesion.  An elliptical shape was then drawn on the skin incorporating the lesion and margins.  Incisions were then made along these lines to the appropriate tissue plane and the lesion was extirpated.
O-T Advancement Flap Text: The defect edges were debeveled with a #15 scalpel blade.  Given the location of the defect, shape of the defect and the proximity to free margins an O-T advancement flap was deemed most appropriate.  Using a sterile surgical marker, an appropriate advancement flap was drawn incorporating the defect and placing the expected incisions within the relaxed skin tension lines where possible.    The area thus outlined was incised deep to adipose tissue with a #15 scalpel blade.  The skin margins were undermined to an appropriate distance in all directions utilizing iris scissors.
Transposition Flap Text: The defect edges were debeveled with a #15 scalpel blade.  Given the location of the defect and the proximity to free margins a transposition flap was deemed most appropriate.  Using a sterile surgical marker, an appropriate transposition flap was drawn incorporating the defect.    The area thus outlined was incised deep to adipose tissue with a #15 scalpel blade.  The skin margins were undermined to an appropriate distance in all directions utilizing iris scissors.
Bilobed Flap Text: The defect edges were debeveled with a #15 scalpel blade.  Given the location of the defect and the proximity to free margins a bilobe flap was deemed most appropriate.  Using a sterile surgical marker, an appropriate bilobe flap drawn around the defect.    The area thus outlined was incised deep to adipose tissue with a #15 scalpel blade.  The skin margins were undermined to an appropriate distance in all directions utilizing iris scissors.
Information: Selecting Yes will display possible errors in your note based on the variables you have selected. This validation is only offered as a suggestion for you. PLEASE NOTE THAT THE VALIDATION TEXT WILL BE REMOVED WHEN YOU FINALIZE YOUR NOTE. IF YOU WANT TO FAX A PRELIMINARY NOTE YOU WILL NEED TO TOGGLE THIS TO 'NO' IF YOU DO NOT WANT IT IN YOUR FAXED NOTE.
Complex Repair And Skin Substitute Graft Text: The defect edges were debeveled with a #15 scalpel blade.  The primary defect was closed partially with a complex linear closure.  Given the location of the remaining defect, shape of the defect and the proximity to free margins a skin substitute graft was deemed most appropriate to repair the remaining defect.  The graft was trimmed to fit the size of the remaining defect.  The graft was then placed in the primary defect, oriented appropriately, and sutured into place.
Anesthesia Type: 2% lidocaine with epinephrine
Modified Advancement Flap Text: The defect edges were debeveled with a #15 scalpel blade.  Given the location of the defect, shape of the defect and the proximity to free margins a modified advancement flap was deemed most appropriate.  Using a sterile surgical marker, an appropriate advancement flap was drawn incorporating the defect and placing the expected incisions within the relaxed skin tension lines where possible.    The area thus outlined was incised deep to adipose tissue with a #15 scalpel blade.  The skin margins were undermined to an appropriate distance in all directions utilizing iris scissors.
Suturegard Intro: Intraoperative tissue expansion was performed, utilizing the SUTUREGARD device, in order to reduce wound tension.
Dressing: dry sterile dressing
Xenograft Text: The defect edges were debeveled with a #15 scalpel blade.  Given the location of the defect, shape of the defect and the proximity to free margins a xenograft was deemed most appropriate.  The graft was then trimmed to fit the size of the defect.  The graft was then placed in the primary defect and oriented appropriately.
Ear Star Wedge Flap Text: The defect edges were debeveled with a #15 blade scalpel.  Given the location of the defect and the proximity to free margins (helical rim) an ear star wedge flap was deemed most appropriate.  Using a sterile surgical marker, the appropriate flap was drawn incorporating the defect and placing the expected incisions between the helical rim and antihelix where possible.  The area thus outlined was incised through and through with a #15 scalpel blade.
Complex Repair And Single Advancement Flap Text: The defect edges were debeveled with a #15 scalpel blade.  The primary defect was closed partially with a complex linear closure.  Given the location of the remaining defect, shape of the defect and the proximity to free margins a single advancement flap was deemed most appropriate for complete closure of the defect.  Using a sterile surgical marker, an appropriate advancement flap was drawn incorporating the defect and placing the expected incisions within the relaxed skin tension lines where possible.    The area thus outlined was incised deep to adipose tissue with a #15 scalpel blade.  The skin margins were undermined to an appropriate distance in all directions utilizing iris scissors.
Fusiform Excision Additional Text (Leave Blank If You Do Not Want): The margin was drawn around the clinically apparent lesion.  A fusiform shape was then drawn on the skin incorporating the lesion and margins.  Incisions were then made along these lines to the appropriate tissue plane and the lesion was extirpated.
Skin Substitute Text: The defect edges were debeveled with a #15 scalpel blade.  Given the location of the defect, shape of the defect and the proximity to free margins a skin substitute graft was deemed most appropriate.  The graft material was trimmed to fit the size of the defect. The graft was then placed in the primary defect and oriented appropriately.
Complex Repair And Ftsg Text: The defect edges were debeveled with a #15 scalpel blade.  The primary defect was closed partially with a complex linear closure.  Given the location of the defect, shape of the defect and the proximity to free margins a full thickness skin graft was deemed most appropriate to repair the remaining defect.  The graft was trimmed to fit the size of the remaining defect.  The graft was then placed in the primary defect, oriented appropriately, and sutured into place.
Chonodrocutaneous Helical Advancement Flap Text: The defect edges were debeveled with a #15 scalpel blade.  Given the location of the defect and the proximity to free margins a chondrocutaneous helical advancement flap was deemed most appropriate.  Using a sterile surgical marker, the appropriate advancement flap was drawn incorporating the defect and placing the expected incisions within the relaxed skin tension lines where possible.    The area thus outlined was incised deep to adipose tissue with a #15 scalpel blade.  The skin margins were undermined to an appropriate distance in all directions utilizing iris scissors.
O-L Flap Text: The defect edges were debeveled with a #15 scalpel blade.  Given the location of the defect, shape of the defect and the proximity to free margins an O-L flap was deemed most appropriate.  Using a sterile surgical marker, an appropriate advancement flap was drawn incorporating the defect and placing the expected incisions within the relaxed skin tension lines where possible.    The area thus outlined was incised deep to adipose tissue with a #15 scalpel blade.  The skin margins were undermined to an appropriate distance in all directions utilizing iris scissors.
Where Do You Want The Question To Include Opioid Counseling Located?: Case Summary Tab
Number Of Hemigard Strips Per Side: 1
Double O-Z Plasty Text: The defect edges were debeveled with a #15 scalpel blade.  Given the location of the defect, shape of the defect and the proximity to free margins a Double O-Z plasty (double transposition flap) was deemed most appropriate.  Using a sterile surgical marker, the appropriate transposition flaps were drawn incorporating the defect and placing the expected incisions within the relaxed skin tension lines where possible. The area thus outlined was incised deep to adipose tissue with a #15 scalpel blade.  The skin margins were undermined to an appropriate distance in all directions utilizing iris scissors.  Hemostasis was achieved with electrocautery.  The flaps were then transposed into place, one clockwise and the other counterclockwise, and anchored with interrupted buried subcutaneous sutures.
Purse String (Simple) Text: Given the location of the defect and the characteristics of the surrounding skin a purse string simple closure was deemed most appropriate.  Undermining was performed circumferentially around the surgical defect.  A purse string suture was then placed and tightened.
Z Plasty Text: The lesion was extirpated to the level of the fat with a #15 scalpel blade.  Given the location of the defect, shape of the defect and the proximity to free margins a Z-plasty was deemed most appropriate for repair.  Using a sterile surgical marker, the appropriate transposition arms of the Z-plasty were drawn incorporating the defect and placing the expected incisions within the relaxed skin tension lines where possible.    The area thus outlined was incised deep to adipose tissue with a #15 scalpel blade.  The skin margins were undermined to an appropriate distance in all directions utilizing iris scissors.  The opposing transposition arms were then transposed into place in opposite direction and anchored with interrupted buried subcutaneous sutures.
Mucosal Advancement Flap Text: Given the location of the defect, shape of the defect and the proximity to free margins a mucosal advancement flap was deemed most appropriate. Incisions were made with a 15 blade scalpel in the appropriate fashion along the cutaneous vermilion border and the mucosal lip. The remaining actinically damaged mucosal tissue was excised.  The mucosal advancement flap was then elevated to the gingival sulcus with care taken to preserve the neurovascular structures and advanced into the primary defect. Care was taken to ensure that precise realignment of the vermilion border was achieved.
Peng Advancement Flap Text: The defect edges were debeveled with a #15 scalpel blade.  Given the location of the defect, shape of the defect and the proximity to free margins a Peng advancement flap was deemed most appropriate.  Using a sterile surgical marker, an appropriate advancement flap was drawn incorporating the defect and placing the expected incisions within the relaxed skin tension lines where possible. The area thus outlined was incised deep to adipose tissue with a #15 scalpel blade.  The skin margins were undermined to an appropriate distance in all directions utilizing iris scissors.
Epidermal Autograft Text: The defect edges were debeveled with a #15 scalpel blade.  Given the location of the defect, shape of the defect and the proximity to free margins an epidermal autograft was deemed most appropriate.  Using a sterile surgical marker, the primary defect shape was transferred to the donor site. The epidermal graft was then harvested.  The skin graft was then placed in the primary defect and oriented appropriately.
Island Pedicle Flap-Requiring Vessel Identification Text: The defect edges were debeveled with a #15 scalpel blade.  Given the location of the defect, shape of the defect and the proximity to free margins an island pedicle advancement flap was deemed most appropriate.  Using a sterile surgical marker, an appropriate advancement flap was drawn, based on the axial vessel mentioned above, incorporating the defect, outlining the appropriate donor tissue and placing the expected incisions within the relaxed skin tension lines where possible.    The area thus outlined was incised deep to adipose tissue with a #15 scalpel blade.  The skin margins were undermined to an appropriate distance in all directions around the primary defect and laterally outward around the island pedicle utilizing iris scissors.  There was minimal undermining beneath the pedicle flap.
Path Notes (To The Dermatopathologist): Please check margins.
Complex Repair And Transposition Flap Text: The defect edges were debeveled with a #15 scalpel blade.  The primary defect was closed partially with a complex linear closure.  Given the location of the remaining defect, shape of the defect and the proximity to free margins a transposition flap was deemed most appropriate for complete closure of the defect.  Using a sterile surgical marker, an appropriate advancement flap was drawn incorporating the defect and placing the expected incisions within the relaxed skin tension lines where possible.    The area thus outlined was incised deep to adipose tissue with a #15 scalpel blade.  The skin margins were undermined to an appropriate distance in all directions utilizing iris scissors.

## 2020-11-16 NOTE — PROCEDURE: PATHOLOGY BILLING
Immunohistochemistry ([id number] and [id number]) billing is not performed here. Please use the Immunohistochemistry Stain Billing plan to accomplish this. Immunohistochemistry ([id number] and [id number]) billing is not performed here. Please use the Immunohistochemistry Stain Billing plan to accomplish this.

## 2020-11-30 ENCOUNTER — APPOINTMENT (RX ONLY)
Dept: URBAN - METROPOLITAN AREA CLINIC 349 | Facility: CLINIC | Age: 85
Setting detail: DERMATOLOGY
End: 2020-11-30

## 2020-11-30 DIAGNOSIS — L90.5 SCAR CONDITIONS AND FIBROSIS OF SKIN: ICD-10-CM

## 2020-11-30 PROCEDURE — 99024 POSTOP FOLLOW-UP VISIT: CPT

## 2020-11-30 PROCEDURE — ? SUTURE REMOVAL (GLOBAL PERIOD)

## 2020-11-30 ASSESSMENT — LOCATION ZONE DERM: LOCATION ZONE: TRUNK

## 2020-11-30 ASSESSMENT — LOCATION DETAILED DESCRIPTION DERM: LOCATION DETAILED: RIGHT MEDIAL SUPERIOR CHEST

## 2020-11-30 ASSESSMENT — LOCATION SIMPLE DESCRIPTION DERM: LOCATION SIMPLE: CHEST

## 2020-11-30 NOTE — PROCEDURE: SUTURE REMOVAL (GLOBAL PERIOD)
Detail Level: Detailed
Add 07900 Cpt? (Important Note: In 2017 The Use Of 54105 Is Being Tracked By Cms To Determine Future Global Period Reimbursement For Global Periods): yes

## 2021-01-14 ENCOUNTER — APPOINTMENT (RX ONLY)
Dept: URBAN - METROPOLITAN AREA CLINIC 349 | Facility: CLINIC | Age: 86
Setting detail: DERMATOLOGY
End: 2021-01-14

## 2021-01-14 DIAGNOSIS — L98.8 OTHER SPECIFIED DISORDERS OF THE SKIN AND SUBCUTANEOUS TISSUE: ICD-10-CM

## 2021-01-14 DIAGNOSIS — Z48.02 ENCOUNTER FOR REMOVAL OF SUTURES: ICD-10-CM

## 2021-01-14 PROBLEM — C44.219 BASAL CELL CARCINOMA OF SKIN OF LEFT EAR AND EXTERNAL AURICULAR CANAL: Status: ACTIVE | Noted: 2021-01-14

## 2021-01-14 PROBLEM — D04.39 CARCINOMA IN SITU OF SKIN OF OTHER PARTS OF FACE: Status: ACTIVE | Noted: 2021-01-14

## 2021-01-14 PROCEDURE — ? PRESCRIPTION

## 2021-01-14 PROCEDURE — 99212 OFFICE O/P EST SF 10 MIN: CPT

## 2021-01-14 PROCEDURE — 17281 DSTR MAL LS F/E/E/N/L/M .6-1: CPT | Mod: 76

## 2021-01-14 PROCEDURE — 17281 DSTR MAL LS F/E/E/N/L/M .6-1: CPT

## 2021-01-14 PROCEDURE — A4550 SURGICAL TRAYS: HCPCS

## 2021-01-14 PROCEDURE — ? COUNSELING

## 2021-01-14 PROCEDURE — ? CURETTAGE AND DESTRUCTION

## 2021-01-14 RX ORDER — UREA 400 MG/G
CREAM TOPICAL
Qty: 1 | Refills: 4 | Status: ERX | COMMUNITY
Start: 2021-01-14

## 2021-01-14 RX ADMIN — UREA: 400 CREAM TOPICAL at 00:00

## 2021-01-14 ASSESSMENT — LOCATION ZONE DERM
LOCATION ZONE: TRUNK
LOCATION ZONE: FEET

## 2021-01-14 ASSESSMENT — LOCATION DETAILED DESCRIPTION DERM
LOCATION DETAILED: RIGHT MEDIAL SUPERIOR CHEST
LOCATION DETAILED: RIGHT LATERAL PLANTAR HEEL

## 2021-01-14 ASSESSMENT — LOCATION SIMPLE DESCRIPTION DERM
LOCATION SIMPLE: RIGHT PLANTAR SURFACE
LOCATION SIMPLE: CHEST

## 2021-01-14 NOTE — PROCEDURE: CURETTAGE AND DESTRUCTION
Size Of Lesion After Curettage: 0.8
Anesthesia Volume In Cc: 0.5
Cautery Type: electrodesiccation
Render Post-Care Instructions In Note?: no
Detail Level: Detailed
Size Of Lesion In Cm: 0.6
Consent was obtained from the patient. The risks, benefits and alternatives to therapy were discussed in detail. Specifically, the risks of infection, scarring, bleeding, prolonged wound healing, nerve injury, incomplete removal, allergy to anesthesia and recurrence were addressed. Alternatives to ED&C, such as: surgical removal and XRT were also discussed.  Prior to the procedure, the treatment site was clearly identified and confirmed by the patient. All components of Universal Protocol/PAUSE Rule completed.
Bill For Surgical Tray: yes
What Was Performed First?: Destruction
Bill As A Line Item Or As Units: Line Item
Number Of Curettages: 3
Anesthesia Type: 1% lidocaine with 1:100,000 epinephrine and a 1:10 solution of 8.4% sodium bicarbonate
Total Volume (Ccs): 1
Post-Care Instructions: I reviewed with the patient in detail post-care instructions. Patient is to keep the area dry for 48 hours, and not to engage in any swimming until the area is healed. Should the patient develop any fevers, chills, bleeding, severe pain patient will contact the office immediately.
Additional Information: (Optional): The wound was cleaned, and a pressure dressing was applied.  The patient received detailed post-op details in length. Aft the procedure, the patient was observed for 5-10 minutes and was oriented to person, place, and time.  Denied feeling dizzy, queasy, and stated that they did not feel that they were going to faint.

## 2021-03-31 ENCOUNTER — APPOINTMENT (RX ONLY)
Dept: URBAN - METROPOLITAN AREA CLINIC 349 | Facility: CLINIC | Age: 86
Setting detail: DERMATOLOGY
End: 2021-03-31

## 2021-03-31 DIAGNOSIS — L82.1 OTHER SEBORRHEIC KERATOSIS: ICD-10-CM

## 2021-03-31 DIAGNOSIS — L57.0 ACTINIC KERATOSIS: ICD-10-CM | Status: INADEQUATELY CONTROLLED

## 2021-03-31 DIAGNOSIS — L90.5 SCAR CONDITIONS AND FIBROSIS OF SKIN: ICD-10-CM | Status: RESOLVED

## 2021-03-31 DIAGNOSIS — L81.4 OTHER MELANIN HYPERPIGMENTATION: ICD-10-CM

## 2021-03-31 PROBLEM — D04.21 CARCINOMA IN SITU OF SKIN OF RIGHT EAR AND EXTERNAL AURICULAR CANAL: Status: ACTIVE | Noted: 2021-03-31

## 2021-03-31 PROBLEM — D04.4 CARCINOMA IN SITU OF SKIN OF SCALP AND NECK: Status: ACTIVE | Noted: 2021-03-31

## 2021-03-31 PROCEDURE — 17281 DSTR MAL LS F/E/E/N/L/M .6-1: CPT

## 2021-03-31 PROCEDURE — ? PHOTO-DOCUMENTATION

## 2021-03-31 PROCEDURE — ? PATHOLOGY BILLING

## 2021-03-31 PROCEDURE — 99213 OFFICE O/P EST LOW 20 MIN: CPT | Mod: 25

## 2021-03-31 PROCEDURE — ? CURETTAGE AND DESTRUCTION WITH PATHOLOGY

## 2021-03-31 PROCEDURE — 17271 DSTR MAL LES S/N/H/F/G 0.6-1: CPT

## 2021-03-31 PROCEDURE — 88305 TISSUE EXAM BY PATHOLOGIST: CPT

## 2021-03-31 PROCEDURE — ? COUNSELING

## 2021-03-31 PROCEDURE — 17004 DESTROY PREMAL LESIONS 15/>: CPT | Mod: 59

## 2021-03-31 PROCEDURE — ? LIQUID NITROGEN

## 2021-03-31 PROCEDURE — A4550 SURGICAL TRAYS: HCPCS

## 2021-03-31 ASSESSMENT — LOCATION DETAILED DESCRIPTION DERM
LOCATION DETAILED: RIGHT ULNAR DORSAL HAND
LOCATION DETAILED: LEFT SUPERIOR LATERAL MALAR CHEEK
LOCATION DETAILED: LEFT INFERIOR TEMPLE
LOCATION DETAILED: LEFT DISTAL DORSAL FOREARM
LOCATION DETAILED: STERNUM
LOCATION DETAILED: RIGHT PROXIMAL DORSAL FOREARM
LOCATION DETAILED: RIGHT INFERIOR TEMPLE
LOCATION DETAILED: LEFT SUPERIOR CENTRAL MALAR CHEEK
LOCATION DETAILED: LEFT PROXIMAL DORSAL FOREARM
LOCATION DETAILED: RIGHT DISTAL DORSAL FOREARM
LOCATION DETAILED: RIGHT SUPERIOR LATERAL MALAR CHEEK
LOCATION DETAILED: LEFT CENTRAL MALAR CHEEK
LOCATION DETAILED: LEFT RADIAL DORSAL HAND

## 2021-03-31 ASSESSMENT — LOCATION SIMPLE DESCRIPTION DERM
LOCATION SIMPLE: LEFT TEMPLE
LOCATION SIMPLE: CHEST
LOCATION SIMPLE: LEFT CHEEK
LOCATION SIMPLE: RIGHT TEMPLE
LOCATION SIMPLE: RIGHT CHEEK
LOCATION SIMPLE: RIGHT FOREARM
LOCATION SIMPLE: LEFT HAND
LOCATION SIMPLE: LEFT FOREARM
LOCATION SIMPLE: RIGHT HAND

## 2021-03-31 ASSESSMENT — LOCATION ZONE DERM
LOCATION ZONE: HAND
LOCATION ZONE: FACE
LOCATION ZONE: ARM
LOCATION ZONE: TRUNK

## 2021-03-31 NOTE — PROCEDURE: CURETTAGE AND DESTRUCTION WITH PATHOLOGY
What Was Performed First?: Curettage
Bill For Surgical Tray: yes
Anesthesia Type: 1% lidocaine with epinephrine
Post-Care Instructions: I reviewed with the patient in detail post-care instructions. Patient is to keep the area dry for 48 hours, and not to engage in any swimming until the area is healed. Should the patient develop any fevers, chills, bleeding, severe pain patient will contact the office immediately.
Number Of Curettages: 3
Billing Type: Third-Party Bill
Size Of Lesion In Cm: 0.6
Render Post-Care Instructions In Note?: no
Accession #: [id number]
Cautery Type: hot cautery
Additional Information: (Optional): The wound was cleaned, and a pressure dressing was applied.  The patient received detailed post-op instructions.\\nAft the procedure, the patient was observed for 5-10 minutes and was oriented to person, place, and time.  Denied feeling dizzy, queasy, and stated that they did not feel that they were going to faint.
Consent was obtained from the patient. The risks, benefits and alternatives to therapy were discussed in detail. Specifically, the risks of infection, scarring, bleeding, prolonged wound healing, nerve injury, incomplete removal, allergy to anesthesia and recurrence were addressed. Alternatives to ED&C, such as: surgical removal and XRT were also discussed.  Prior to the procedure, the treatment site was clearly identified and confirmed by the patient. All components of Universal Protocol/PAUSE Rule completed.
Biopsy Type: H and E
Detail Level: Detailed
Bill As A Line Item Or As Units: Line Item
Histology Text: Following the procedure a portion of the curetted material was sent for histologic evaluation.
Size Of Lesion After Curettage: 0.8
Anesthesia Volume In Cc: 1.5

## 2021-03-31 NOTE — PROCEDURE: LIQUID NITROGEN
Render Note In Bullet Format When Appropriate: No
Number Of Freeze-Thaw Cycles: 2 freeze-thaw cycles
Detail Level: Zone
Post-Care Instructions: I reviewed with the patient in detail post-care instructions. Patient is to wear sunprotection, and avoid picking at any of the treated lesions. Pt may apply Vaseline to crusted or scabbing areas.
Duration Of Freeze Thaw-Cycle (Seconds): 2
Consent: The patient's consent was obtained including but not limited to risks of crusting, scabbing, blistering, scarring, darker or lighter pigmentary change, recurrence, incomplete removal and infection.

## 2021-03-31 NOTE — PROCEDURE: PATHOLOGY BILLING
Immunohistochemistry ([id number] and [id number]) billing is not performed here. Please use the Immunohistochemistry Stain Billing plan to accomplish this.

## 2021-03-31 NOTE — PROCEDURE: PATHOLOGY BILLING
Immunohistochemistry ([id number] and [id number]) billing is not performed here. Please use the Immunohistochemistry Stain Billing plan to accomplish this. Immunohistochemistry ([id number] and [id number]) billing is not performed here. Please use the Immunohistochemistry Stain Billing plan to accomplish this.

## 2022-02-02 ENCOUNTER — APPOINTMENT (OUTPATIENT)
Dept: GENERAL RADIOLOGY | Age: 87
DRG: 522 | End: 2022-02-02
Attending: EMERGENCY MEDICINE
Payer: MEDICARE

## 2022-02-02 ENCOUNTER — HOSPITAL ENCOUNTER (INPATIENT)
Age: 87
LOS: 6 days | Discharge: NURSING, REHAB OR LONG-TERM CARE | DRG: 522 | End: 2022-02-08
Attending: EMERGENCY MEDICINE | Admitting: INTERNAL MEDICINE
Payer: MEDICARE

## 2022-02-02 DIAGNOSIS — I44.1 MOBITZ TYPE 1 SECOND DEGREE ATRIOVENTRICULAR BLOCK: ICD-10-CM

## 2022-02-02 DIAGNOSIS — R00.1 SINUS BRADYCARDIA: ICD-10-CM

## 2022-02-02 DIAGNOSIS — R00.1 BRADYCARDIA: ICD-10-CM

## 2022-02-02 DIAGNOSIS — S72.002A CLOSED DISPLACED FRACTURE OF LEFT FEMORAL NECK (HCC): ICD-10-CM

## 2022-02-02 DIAGNOSIS — I44.1 SECOND DEGREE MOBITZ II AV BLOCK: ICD-10-CM

## 2022-02-02 DIAGNOSIS — S72.002A CLOSED FRACTURE OF LEFT HIP, INITIAL ENCOUNTER (HCC): Primary | ICD-10-CM

## 2022-02-02 PROBLEM — E11.9 DIABETES MELLITUS TYPE 2, CONTROLLED (HCC): Status: ACTIVE | Noted: 2022-02-02

## 2022-02-02 PROBLEM — F01.50 VASCULAR DEMENTIA (HCC): Status: ACTIVE | Noted: 2022-02-02

## 2022-02-02 LAB
ABO + RH BLD: NORMAL
ALBUMIN SERPL-MCNC: 3.5 G/DL (ref 3.2–4.6)
ALBUMIN/GLOB SERPL: 1.1 {RATIO} (ref 1.2–3.5)
ALP SERPL-CCNC: 85 U/L (ref 50–136)
ALT SERPL-CCNC: 20 U/L (ref 12–65)
ANION GAP SERPL CALC-SCNC: 4 MMOL/L (ref 7–16)
AST SERPL-CCNC: 17 U/L (ref 15–37)
BASOPHILS # BLD: 0 K/UL (ref 0–0.2)
BASOPHILS NFR BLD: 0 % (ref 0–2)
BILIRUB SERPL-MCNC: 0.8 MG/DL (ref 0.2–1.1)
BLOOD GROUP ANTIBODIES SERPL: NORMAL
BUN SERPL-MCNC: 22 MG/DL (ref 8–23)
CALCIUM SERPL-MCNC: 9.2 MG/DL (ref 8.3–10.4)
CHLORIDE SERPL-SCNC: 106 MMOL/L (ref 98–107)
CO2 SERPL-SCNC: 32 MMOL/L (ref 21–32)
CREAT SERPL-MCNC: 1 MG/DL (ref 0.8–1.5)
DIFFERENTIAL METHOD BLD: ABNORMAL
EOSINOPHIL # BLD: 0 K/UL (ref 0–0.8)
EOSINOPHIL NFR BLD: 1 % (ref 0.5–7.8)
ERYTHROCYTE [DISTWIDTH] IN BLOOD BY AUTOMATED COUNT: 12.3 % (ref 11.9–14.6)
GLOBULIN SER CALC-MCNC: 3.2 G/DL (ref 2.3–3.5)
GLUCOSE SERPL-MCNC: 146 MG/DL (ref 65–100)
HCT VFR BLD AUTO: 37.2 % (ref 41.1–50.3)
HGB BLD-MCNC: 12.6 G/DL (ref 13.6–17.2)
IMM GRANULOCYTES # BLD AUTO: 0 K/UL (ref 0–0.5)
IMM GRANULOCYTES NFR BLD AUTO: 1 % (ref 0–5)
LYMPHOCYTES # BLD: 0.9 K/UL (ref 0.5–4.6)
LYMPHOCYTES NFR BLD: 19 % (ref 13–44)
MCH RBC QN AUTO: 30.1 PG (ref 26.1–32.9)
MCHC RBC AUTO-ENTMCNC: 33.9 G/DL (ref 31.4–35)
MCV RBC AUTO: 88.8 FL (ref 79.6–97.8)
MONOCYTES # BLD: 0.4 K/UL (ref 0.1–1.3)
MONOCYTES NFR BLD: 9 % (ref 4–12)
NEUTS SEG # BLD: 3.2 K/UL (ref 1.7–8.2)
NEUTS SEG NFR BLD: 70 % (ref 43–78)
NRBC # BLD: 0 K/UL (ref 0–0.2)
PHOSPHATE SERPL-MCNC: 2.9 MG/DL (ref 2.3–3.7)
PLATELET # BLD AUTO: 174 K/UL (ref 150–450)
PMV BLD AUTO: 10.3 FL (ref 9.4–12.3)
POTASSIUM SERPL-SCNC: 3.8 MMOL/L (ref 3.5–5.1)
PROT SERPL-MCNC: 6.7 G/DL (ref 6.3–8.2)
RBC # BLD AUTO: 4.19 M/UL (ref 4.23–5.6)
SODIUM SERPL-SCNC: 142 MMOL/L (ref 136–145)
SPECIMEN EXP DATE BLD: NORMAL
TSH SERPL DL<=0.005 MIU/L-ACNC: 4.54 UIU/ML (ref 0.36–3.74)
WBC # BLD AUTO: 4.5 K/UL (ref 4.3–11.1)

## 2022-02-02 PROCEDURE — 96374 THER/PROPH/DIAG INJ IV PUSH: CPT

## 2022-02-02 PROCEDURE — 86900 BLOOD TYPING SEROLOGIC ABO: CPT

## 2022-02-02 PROCEDURE — 84443 ASSAY THYROID STIM HORMONE: CPT

## 2022-02-02 PROCEDURE — 85025 COMPLETE CBC W/AUTO DIFF WBC: CPT

## 2022-02-02 PROCEDURE — 93005 ELECTROCARDIOGRAM TRACING: CPT | Performed by: EMERGENCY MEDICINE

## 2022-02-02 PROCEDURE — 80053 COMPREHEN METABOLIC PANEL: CPT

## 2022-02-02 PROCEDURE — 65270000029 HC RM PRIVATE

## 2022-02-02 PROCEDURE — 71045 X-RAY EXAM CHEST 1 VIEW: CPT

## 2022-02-02 PROCEDURE — 73552 X-RAY EXAM OF FEMUR 2/>: CPT

## 2022-02-02 PROCEDURE — 74011250636 HC RX REV CODE- 250/636: Performed by: EMERGENCY MEDICINE

## 2022-02-02 PROCEDURE — 73502 X-RAY EXAM HIP UNI 2-3 VIEWS: CPT

## 2022-02-02 PROCEDURE — 83735 ASSAY OF MAGNESIUM: CPT

## 2022-02-02 PROCEDURE — 99284 EMERGENCY DEPT VISIT MOD MDM: CPT

## 2022-02-02 PROCEDURE — 84100 ASSAY OF PHOSPHORUS: CPT

## 2022-02-02 PROCEDURE — 96375 TX/PRO/DX INJ NEW DRUG ADDON: CPT

## 2022-02-02 RX ORDER — ONDANSETRON 2 MG/ML
4 INJECTION INTRAMUSCULAR; INTRAVENOUS
Status: COMPLETED | OUTPATIENT
Start: 2022-02-02 | End: 2022-02-02

## 2022-02-02 RX ORDER — FENTANYL CITRATE 50 UG/ML
50 INJECTION, SOLUTION INTRAMUSCULAR; INTRAVENOUS
Status: COMPLETED | OUTPATIENT
Start: 2022-02-02 | End: 2022-02-02

## 2022-02-02 RX ADMIN — ONDANSETRON 4 MG: 2 INJECTION INTRAMUSCULAR; INTRAVENOUS at 20:59

## 2022-02-02 RX ADMIN — FENTANYL CITRATE 50 MCG: 50 INJECTION, SOLUTION INTRAMUSCULAR; INTRAVENOUS at 20:59

## 2022-02-03 ENCOUNTER — APPOINTMENT (OUTPATIENT)
Dept: NON INVASIVE DIAGNOSTICS | Age: 87
DRG: 522 | End: 2022-02-03
Attending: NURSE PRACTITIONER
Payer: MEDICARE

## 2022-02-03 ENCOUNTER — APPOINTMENT (OUTPATIENT)
Dept: GENERAL RADIOLOGY | Age: 87
DRG: 522 | End: 2022-02-03
Attending: INTERNAL MEDICINE
Payer: MEDICARE

## 2022-02-03 ENCOUNTER — ANESTHESIA EVENT (OUTPATIENT)
Dept: SURGERY | Age: 87
DRG: 522 | End: 2022-02-03
Payer: MEDICARE

## 2022-02-03 ENCOUNTER — ANESTHESIA (OUTPATIENT)
Dept: CARDIAC CATH/INVASIVE PROCEDURES | Age: 87
DRG: 522 | End: 2022-02-03
Payer: MEDICARE

## 2022-02-03 ENCOUNTER — ANESTHESIA EVENT (OUTPATIENT)
Dept: CARDIAC CATH/INVASIVE PROCEDURES | Age: 87
DRG: 522 | End: 2022-02-03
Payer: MEDICARE

## 2022-02-03 LAB
ANION GAP SERPL CALC-SCNC: 0 MMOL/L (ref 7–16)
ATRIAL RATE: 0 BPM
ATRIAL RATE: 73 BPM
BASOPHILS # BLD: 0 K/UL (ref 0–0.2)
BASOPHILS NFR BLD: 0 % (ref 0–2)
BUN SERPL-MCNC: 18 MG/DL (ref 8–23)
CALCIUM SERPL-MCNC: 9.1 MG/DL (ref 8.3–10.4)
CALCULATED P AXIS, ECG09: 71 DEGREES
CALCULATED R AXIS, ECG10: 68 DEGREES
CALCULATED R AXIS, ECG10: 72 DEGREES
CALCULATED T AXIS, ECG11: 45 DEGREES
CALCULATED T AXIS, ECG11: 60 DEGREES
CHLORIDE SERPL-SCNC: 108 MMOL/L (ref 98–107)
CO2 SERPL-SCNC: 32 MMOL/L (ref 21–32)
CREAT SERPL-MCNC: 0.8 MG/DL (ref 0.8–1.5)
DIAGNOSIS, 93000: NORMAL
DIAGNOSIS, 93000: NORMAL
DIFFERENTIAL METHOD BLD: ABNORMAL
ECHO AO ASC DIAM: 3.8 CM
ECHO AO ASCENDING AORTA INDEX: 2.04 CM/M2
ECHO AO ROOT DIAM: 4.3 CM
ECHO AO ROOT INDEX: 2.31 CM/M2
ECHO AV AREA PEAK VELOCITY: 3.4 CM2
ECHO AV AREA VTI: 3.3 CM2
ECHO AV AREA/BSA PEAK VELOCITY: 1.8 CM2/M2
ECHO AV AREA/BSA VTI: 1.8 CM2/M2
ECHO AV MEAN GRADIENT: 1 MMHG
ECHO AV MEAN VELOCITY: 0.5 M/S
ECHO AV PEAK GRADIENT: 2 MMHG
ECHO AV PEAK VELOCITY: 0.8 M/S
ECHO AV VELOCITY RATIO: 1
ECHO AV VTI: 20.2 CM
ECHO LA AREA 2C: 20.9 CM2
ECHO LA AREA 4C: 18.5 CM2
ECHO LA DIAMETER INDEX: 1.67 CM/M2
ECHO LA DIAMETER: 3.1 CM
ECHO LA MAJOR AXIS: 5.8 CM
ECHO LA MINOR AXIS: 5.3 CM
ECHO LA TO AORTIC ROOT RATIO: 0.72
ECHO LA VOL BP: 60 ML (ref 18–58)
ECHO LA VOL/BSA BIPLANE: 32 ML/M2 (ref 16–34)
ECHO LV E' LATERAL VELOCITY: 10 CM/S
ECHO LV E' SEPTAL VELOCITY: 10 CM/S
ECHO LV EDV A4C: 99 ML
ECHO LV EDV INDEX A4C: 53 ML/M2
ECHO LV EJECTION FRACTION A4C: 60 %
ECHO LV ESV A4C: 39 ML
ECHO LV ESV INDEX A4C: 21 ML/M2
ECHO LV FRACTIONAL SHORTENING: 28 % (ref 28–44)
ECHO LV INTERNAL DIMENSION DIASTOLE INDEX: 2.31 CM/M2
ECHO LV INTERNAL DIMENSION DIASTOLIC: 4.3 CM (ref 4.2–5.9)
ECHO LV INTERNAL DIMENSION SYSTOLIC INDEX: 1.67 CM/M2
ECHO LV INTERNAL DIMENSION SYSTOLIC: 3.1 CM
ECHO LV IVSD: 1 CM (ref 0.6–1)
ECHO LV MASS 2D: 132.7 G (ref 88–224)
ECHO LV MASS INDEX 2D: 71.4 G/M2 (ref 49–115)
ECHO LV POSTERIOR WALL DIASTOLIC: 0.9 CM (ref 0.6–1)
ECHO LV RELATIVE WALL THICKNESS RATIO: 0.42
ECHO LVOT AREA: 3.5 CM2
ECHO LVOT AV VTI INDEX: 0.95
ECHO LVOT DIAM: 2.1 CM
ECHO LVOT MEAN GRADIENT: 1 MMHG
ECHO LVOT PEAK GRADIENT: 2 MMHG
ECHO LVOT PEAK VELOCITY: 0.8 M/S
ECHO LVOT STROKE VOLUME INDEX: 35.7 ML/M2
ECHO LVOT SV: 66.5 ML
ECHO LVOT VTI: 19.2 CM
ECHO MV A VELOCITY: 0.89 M/S
ECHO MV E DECELERATION TIME (DT): 292 MS
ECHO MV E VELOCITY: 0.88 M/S
ECHO MV E/A RATIO: 0.99
ECHO MV E/E' LATERAL: 8.8
ECHO MV E/E' RATIO (AVERAGED): 8.8
ECHO MV E/E' SEPTAL: 8.8
ECHO RV BASAL DIMENSION: 3.5 CM
ECHO RV TAPSE: 2.4 CM (ref 1.5–2)
ECHO TV REGURGITANT MAX VELOCITY: 1.73 M/S
ECHO TV REGURGITANT PEAK GRADIENT: 12 MMHG
EOSINOPHIL # BLD: 0.1 K/UL (ref 0–0.8)
EOSINOPHIL NFR BLD: 1 % (ref 0.5–7.8)
ERYTHROCYTE [DISTWIDTH] IN BLOOD BY AUTOMATED COUNT: 12 % (ref 11.9–14.6)
GLUCOSE BLD STRIP.AUTO-MCNC: 114 MG/DL (ref 65–100)
GLUCOSE BLD STRIP.AUTO-MCNC: 147 MG/DL (ref 65–100)
GLUCOSE BLD STRIP.AUTO-MCNC: 175 MG/DL (ref 65–100)
GLUCOSE BLD STRIP.AUTO-MCNC: 85 MG/DL (ref 65–100)
GLUCOSE SERPL-MCNC: 124 MG/DL (ref 65–100)
HCT VFR BLD AUTO: 34.5 % (ref 41.1–50.3)
HGB BLD-MCNC: 11.7 G/DL (ref 13.6–17.2)
IMM GRANULOCYTES # BLD AUTO: 0 K/UL (ref 0–0.5)
IMM GRANULOCYTES NFR BLD AUTO: 0 % (ref 0–5)
LYMPHOCYTES # BLD: 0.8 K/UL (ref 0.5–4.6)
LYMPHOCYTES NFR BLD: 10 % (ref 13–44)
MCH RBC QN AUTO: 30.2 PG (ref 26.1–32.9)
MCHC RBC AUTO-ENTMCNC: 33.9 G/DL (ref 31.4–35)
MCV RBC AUTO: 89.1 FL (ref 79.6–97.8)
MONOCYTES # BLD: 0.7 K/UL (ref 0.1–1.3)
MONOCYTES NFR BLD: 9 % (ref 4–12)
NEUTS SEG # BLD: 5.9 K/UL (ref 1.7–8.2)
NEUTS SEG NFR BLD: 79 % (ref 43–78)
NRBC # BLD: 0 K/UL (ref 0–0.2)
P-R INTERVAL, ECG05: 269 MS
PLATELET # BLD AUTO: 153 K/UL (ref 150–450)
PMV BLD AUTO: 10.5 FL (ref 9.4–12.3)
POTASSIUM SERPL-SCNC: 3.9 MMOL/L (ref 3.5–5.1)
Q-T INTERVAL, ECG07: 477 MS
Q-T INTERVAL, ECG07: 540 MS
QRS DURATION, ECG06: 139 MS
QRS DURATION, ECG06: 97 MS
QTC CALCULATION (BEZET), ECG08: 369 MS
QTC CALCULATION (BEZET), ECG08: 412 MS
RBC # BLD AUTO: 3.87 M/UL (ref 4.23–5.6)
SERVICE CMNT-IMP: ABNORMAL
SERVICE CMNT-IMP: NORMAL
SODIUM SERPL-SCNC: 140 MMOL/L (ref 138–145)
VENTRICULAR RATE, ECG03: 35 BPM
VENTRICULAR RATE, ECG03: 36 BPM
WBC # BLD AUTO: 7.4 K/UL (ref 4.3–11.1)

## 2022-02-03 PROCEDURE — C1894 INTRO/SHEATH, NON-LASER: HCPCS | Performed by: INTERNAL MEDICINE

## 2022-02-03 PROCEDURE — 65660000000 HC RM CCU STEPDOWN

## 2022-02-03 PROCEDURE — 93005 ELECTROCARDIOGRAM TRACING: CPT | Performed by: INTERNAL MEDICINE

## 2022-02-03 PROCEDURE — 77030010507 HC ADH SKN DERMBND J&J -B: Performed by: INTERNAL MEDICINE

## 2022-02-03 PROCEDURE — 77030013504 HC DEV ELECSURG MEDT -F: Performed by: INTERNAL MEDICINE

## 2022-02-03 PROCEDURE — 77030041279 HC DRSG PRMSL AG MDII -B: Performed by: INTERNAL MEDICINE

## 2022-02-03 PROCEDURE — 33208 INSRT HEART PM ATRIAL & VENT: CPT | Performed by: INTERNAL MEDICINE

## 2022-02-03 PROCEDURE — C1892 INTRO/SHEATH,FIXED,PEEL-AWAY: HCPCS | Performed by: INTERNAL MEDICINE

## 2022-02-03 PROCEDURE — 74011000250 HC RX REV CODE- 250: Performed by: INTERNAL MEDICINE

## 2022-02-03 PROCEDURE — C1785 PMKR, DUAL, RATE-RESP: HCPCS | Performed by: INTERNAL MEDICINE

## 2022-02-03 PROCEDURE — 80048 BASIC METABOLIC PNL TOTAL CA: CPT

## 2022-02-03 PROCEDURE — 74011000272 HC RX REV CODE- 272: Performed by: INTERNAL MEDICINE

## 2022-02-03 PROCEDURE — 74011250637 HC RX REV CODE- 250/637: Performed by: INTERNAL MEDICINE

## 2022-02-03 PROCEDURE — 76060000033 HC ANESTHESIA 1 TO 1.5 HR: Performed by: INTERNAL MEDICINE

## 2022-02-03 PROCEDURE — 74011250637 HC RX REV CODE- 250/637: Performed by: HOSPITALIST

## 2022-02-03 PROCEDURE — 74011636637 HC RX REV CODE- 636/637: Performed by: INTERNAL MEDICINE

## 2022-02-03 PROCEDURE — 74011000636 HC RX REV CODE- 636: Performed by: INTERNAL MEDICINE

## 2022-02-03 PROCEDURE — 77030033067 HC SUT PDO STRATFX SPIR J&J -B: Performed by: INTERNAL MEDICINE

## 2022-02-03 PROCEDURE — 74011250636 HC RX REV CODE- 250/636: Performed by: INTERNAL MEDICINE

## 2022-02-03 PROCEDURE — 86580 TB INTRADERMAL TEST: CPT | Performed by: INTERNAL MEDICINE

## 2022-02-03 PROCEDURE — 99223 1ST HOSP IP/OBS HIGH 75: CPT | Performed by: PHYSICIAN ASSISTANT

## 2022-02-03 PROCEDURE — 77030003029 HC SUT VCRL J&J -B: Performed by: INTERNAL MEDICINE

## 2022-02-03 PROCEDURE — 71045 X-RAY EXAM CHEST 1 VIEW: CPT

## 2022-02-03 PROCEDURE — C1898 LEAD, PMKR, OTHER THAN TRANS: HCPCS | Performed by: INTERNAL MEDICINE

## 2022-02-03 PROCEDURE — 2709999900 HC NON-CHARGEABLE SUPPLY

## 2022-02-03 PROCEDURE — 77030018547 HC SUT ETHBND1 J&J -B: Performed by: INTERNAL MEDICINE

## 2022-02-03 PROCEDURE — 36415 COLL VENOUS BLD VENIPUNCTURE: CPT

## 2022-02-03 PROCEDURE — 74011250636 HC RX REV CODE- 250/636: Performed by: NURSE ANESTHETIST, CERTIFIED REGISTERED

## 2022-02-03 PROCEDURE — 74011000250 HC RX REV CODE- 250: Performed by: NURSE ANESTHETIST, CERTIFIED REGISTERED

## 2022-02-03 PROCEDURE — 02HK3JZ INSERTION OF PACEMAKER LEAD INTO RIGHT VENTRICLE, PERCUTANEOUS APPROACH: ICD-10-PCS | Performed by: INTERNAL MEDICINE

## 2022-02-03 PROCEDURE — 85025 COMPLETE CBC W/AUTO DIFF WBC: CPT

## 2022-02-03 PROCEDURE — 02H63JZ INSERTION OF PACEMAKER LEAD INTO RIGHT ATRIUM, PERCUTANEOUS APPROACH: ICD-10-PCS | Performed by: INTERNAL MEDICINE

## 2022-02-03 PROCEDURE — 99223 1ST HOSP IP/OBS HIGH 75: CPT | Performed by: INTERNAL MEDICINE

## 2022-02-03 PROCEDURE — 77030013687 HC GD NDL BARD -B: Performed by: INTERNAL MEDICINE

## 2022-02-03 PROCEDURE — 83735 ASSAY OF MAGNESIUM: CPT

## 2022-02-03 PROCEDURE — 93306 TTE W/DOPPLER COMPLETE: CPT

## 2022-02-03 PROCEDURE — 82962 GLUCOSE BLOOD TEST: CPT

## 2022-02-03 PROCEDURE — 0JH606Z INSERTION OF PACEMAKER, DUAL CHAMBER INTO CHEST SUBCUTANEOUS TISSUE AND FASCIA, OPEN APPROACH: ICD-10-PCS | Performed by: INTERNAL MEDICINE

## 2022-02-03 PROCEDURE — 77030022704 HC SUT VLOC COVD -B: Performed by: INTERNAL MEDICINE

## 2022-02-03 DEVICE — IMPLANTABLE DEVICE
Type: IMPLANTABLE DEVICE | Status: FUNCTIONAL
Brand: SOLIA S 53

## 2022-02-03 DEVICE — IMPLANTABLE DEVICE
Type: IMPLANTABLE DEVICE | Status: FUNCTIONAL
Brand: SOLIA S 60

## 2022-02-03 DEVICE — IMPLANTABLE DEVICE
Type: IMPLANTABLE DEVICE | Status: FUNCTIONAL
Brand: EDORA 8 DR-T

## 2022-02-03 RX ORDER — CYCLOBENZAPRINE HCL 10 MG
10 TABLET ORAL
Status: DISCONTINUED | OUTPATIENT
Start: 2022-02-03 | End: 2022-02-04

## 2022-02-03 RX ORDER — SODIUM CHLORIDE 0.9 % (FLUSH) 0.9 %
5-40 SYRINGE (ML) INJECTION AS NEEDED
Status: DISCONTINUED | OUTPATIENT
Start: 2022-02-03 | End: 2022-02-08 | Stop reason: HOSPADM

## 2022-02-03 RX ORDER — ACETAMINOPHEN 325 MG/1
650 TABLET ORAL EVERY 8 HOURS
Status: DISCONTINUED | OUTPATIENT
Start: 2022-02-03 | End: 2022-02-04

## 2022-02-03 RX ORDER — MORPHINE SULFATE 4 MG/ML
1 INJECTION INTRAVENOUS
Status: DISCONTINUED | OUTPATIENT
Start: 2022-02-03 | End: 2022-02-04 | Stop reason: SDUPTHER

## 2022-02-03 RX ORDER — PRAVASTATIN SODIUM 20 MG/1
20 TABLET ORAL
Status: DISCONTINUED | OUTPATIENT
Start: 2022-02-03 | End: 2022-02-03

## 2022-02-03 RX ORDER — CEFAZOLIN SODIUM/WATER 2 G/20 ML
SYRINGE (ML) INTRAVENOUS AS NEEDED
Status: DISCONTINUED | OUTPATIENT
Start: 2022-02-03 | End: 2022-02-03 | Stop reason: HOSPADM

## 2022-02-03 RX ORDER — SODIUM CHLORIDE 0.9 % (FLUSH) 0.9 %
5-40 SYRINGE (ML) INJECTION EVERY 8 HOURS
Status: DISCONTINUED | OUTPATIENT
Start: 2022-02-03 | End: 2022-02-07

## 2022-02-03 RX ORDER — ACETAMINOPHEN 325 MG/1
650 TABLET ORAL
Status: DISCONTINUED | OUTPATIENT
Start: 2022-02-03 | End: 2022-02-05

## 2022-02-03 RX ORDER — DOCUSATE SODIUM 100 MG/1
100 CAPSULE, LIQUID FILLED ORAL
Status: DISCONTINUED | OUTPATIENT
Start: 2022-02-03 | End: 2022-02-08 | Stop reason: HOSPADM

## 2022-02-03 RX ORDER — MIDODRINE HYDROCHLORIDE 5 MG/1
5 TABLET ORAL
Status: DISCONTINUED | OUTPATIENT
Start: 2022-02-03 | End: 2022-02-03

## 2022-02-03 RX ORDER — PRAVASTATIN SODIUM 20 MG/1
20 TABLET ORAL
Status: DISCONTINUED | OUTPATIENT
Start: 2022-02-03 | End: 2022-02-08 | Stop reason: HOSPADM

## 2022-02-03 RX ORDER — SODIUM CHLORIDE 9 MG/ML
250 INJECTION, SOLUTION INTRAVENOUS CONTINUOUS
Status: DISPENSED | OUTPATIENT
Start: 2022-02-03 | End: 2022-02-03

## 2022-02-03 RX ORDER — LIDOCAINE HYDROCHLORIDE 20 MG/ML
INJECTION, SOLUTION EPIDURAL; INFILTRATION; INTRACAUDAL; PERINEURAL AS NEEDED
Status: DISCONTINUED | OUTPATIENT
Start: 2022-02-03 | End: 2022-02-03 | Stop reason: HOSPADM

## 2022-02-03 RX ORDER — INSULIN LISPRO 100 [IU]/ML
INJECTION, SOLUTION INTRAVENOUS; SUBCUTANEOUS
Status: DISCONTINUED | OUTPATIENT
Start: 2022-02-03 | End: 2022-02-08 | Stop reason: HOSPADM

## 2022-02-03 RX ORDER — SODIUM CHLORIDE, SODIUM LACTATE, POTASSIUM CHLORIDE, CALCIUM CHLORIDE 600; 310; 30; 20 MG/100ML; MG/100ML; MG/100ML; MG/100ML
INJECTION, SOLUTION INTRAVENOUS
Status: DISCONTINUED | OUTPATIENT
Start: 2022-02-03 | End: 2022-02-03 | Stop reason: HOSPADM

## 2022-02-03 RX ORDER — ONDANSETRON 2 MG/ML
4 INJECTION INTRAMUSCULAR; INTRAVENOUS
Status: DISCONTINUED | OUTPATIENT
Start: 2022-02-03 | End: 2022-02-03 | Stop reason: SDUPTHER

## 2022-02-03 RX ORDER — LANOLIN ALCOHOL/MO/W.PET/CERES
1 CREAM (GRAM) TOPICAL
Status: DISCONTINUED | OUTPATIENT
Start: 2022-02-03 | End: 2022-02-08 | Stop reason: HOSPADM

## 2022-02-03 RX ORDER — MIDODRINE HYDROCHLORIDE 5 MG/1
10 TABLET ORAL
Status: DISCONTINUED | OUTPATIENT
Start: 2022-02-03 | End: 2022-02-08 | Stop reason: HOSPADM

## 2022-02-03 RX ORDER — ACETAMINOPHEN 325 MG/1
650 TABLET ORAL
Status: DISCONTINUED | OUTPATIENT
Start: 2022-02-03 | End: 2022-02-03 | Stop reason: SDUPTHER

## 2022-02-03 RX ORDER — SODIUM CHLORIDE 9 MG/ML
100 INJECTION, SOLUTION INTRAVENOUS CONTINUOUS
Status: DISCONTINUED | OUTPATIENT
Start: 2022-02-03 | End: 2022-02-03

## 2022-02-03 RX ORDER — ONDANSETRON 2 MG/ML
4 INJECTION INTRAMUSCULAR; INTRAVENOUS
Status: DISCONTINUED | OUTPATIENT
Start: 2022-02-03 | End: 2022-02-04 | Stop reason: SDUPTHER

## 2022-02-03 RX ORDER — SODIUM CHLORIDE 0.9 % (FLUSH) 0.9 %
5-40 SYRINGE (ML) INJECTION EVERY 8 HOURS
Status: DISCONTINUED | OUTPATIENT
Start: 2022-02-03 | End: 2022-02-08 | Stop reason: HOSPADM

## 2022-02-03 RX ORDER — SODIUM CHLORIDE 9 MG/ML
100 INJECTION, SOLUTION INTRAVENOUS CONTINUOUS
Status: DISCONTINUED | OUTPATIENT
Start: 2022-02-03 | End: 2022-02-06

## 2022-02-03 RX ORDER — PROPOFOL 10 MG/ML
INJECTION, EMULSION INTRAVENOUS AS NEEDED
Status: DISCONTINUED | OUTPATIENT
Start: 2022-02-03 | End: 2022-02-03 | Stop reason: HOSPADM

## 2022-02-03 RX ORDER — PROPOFOL 10 MG/ML
VIAL (ML) INTRAVENOUS
Status: DISCONTINUED | OUTPATIENT
Start: 2022-02-03 | End: 2022-02-03 | Stop reason: HOSPADM

## 2022-02-03 RX ORDER — LIDOCAINE HYDROCHLORIDE AND EPINEPHRINE 10; 10 MG/ML; UG/ML
INJECTION, SOLUTION INFILTRATION; PERINEURAL AS NEEDED
Status: DISCONTINUED | OUTPATIENT
Start: 2022-02-03 | End: 2022-02-03 | Stop reason: HOSPADM

## 2022-02-03 RX ADMIN — ACETAMINOPHEN 650 MG: 325 TABLET ORAL at 05:53

## 2022-02-03 RX ADMIN — PROPOFOL 140 MCG/KG/MIN: 10 INJECTION, EMULSION INTRAVENOUS at 14:45

## 2022-02-03 RX ADMIN — Medication 2 G: at 14:52

## 2022-02-03 RX ADMIN — SODIUM CHLORIDE 250 ML/HR: 900 INJECTION, SOLUTION INTRAVENOUS at 05:53

## 2022-02-03 RX ADMIN — FERROUS SULFATE TAB 325 MG (65 MG ELEMENTAL FE) 325 MG: 325 (65 FE) TAB at 08:04

## 2022-02-03 RX ADMIN — INSULIN LISPRO 2 UNITS: 100 INJECTION, SOLUTION INTRAVENOUS; SUBCUTANEOUS at 08:11

## 2022-02-03 RX ADMIN — SODIUM CHLORIDE, PRESERVATIVE FREE 10 ML: 5 INJECTION INTRAVENOUS at 05:30

## 2022-02-03 RX ADMIN — SODIUM CHLORIDE, PRESERVATIVE FREE 5 ML: 5 INJECTION INTRAVENOUS at 22:05

## 2022-02-03 RX ADMIN — TUBERCULIN PURIFIED PROTEIN DERIVATIVE 5 UNITS: 5 INJECTION, SOLUTION INTRADERMAL at 05:52

## 2022-02-03 RX ADMIN — ACETAMINOPHEN 650 MG: 325 TABLET ORAL at 21:59

## 2022-02-03 RX ADMIN — SODIUM CHLORIDE, PRESERVATIVE FREE 10 ML: 5 INJECTION INTRAVENOUS at 22:05

## 2022-02-03 RX ADMIN — LIDOCAINE HYDROCHLORIDE 60 MG: 20 INJECTION, SOLUTION EPIDURAL; INFILTRATION; INTRACAUDAL; PERINEURAL at 14:37

## 2022-02-03 RX ADMIN — MIDODRINE HYDROCHLORIDE 10 MG: 5 TABLET ORAL at 18:44

## 2022-02-03 RX ADMIN — SODIUM CHLORIDE, PRESERVATIVE FREE 5 ML: 5 INJECTION INTRAVENOUS at 16:00

## 2022-02-03 RX ADMIN — PROPOFOL 30 MG: 10 INJECTION, EMULSION INTRAVENOUS at 14:37

## 2022-02-03 RX ADMIN — PRAVASTATIN SODIUM 20 MG: 20 TABLET ORAL at 21:59

## 2022-02-03 RX ADMIN — PROPOFOL 10 MG: 10 INJECTION, EMULSION INTRAVENOUS at 14:40

## 2022-02-03 RX ADMIN — MIDODRINE HYDROCHLORIDE 10 MG: 5 TABLET ORAL at 08:04

## 2022-02-03 RX ADMIN — SODIUM CHLORIDE, SODIUM LACTATE, POTASSIUM CHLORIDE, AND CALCIUM CHLORIDE: 600; 310; 30; 20 INJECTION, SOLUTION INTRAVENOUS at 14:35

## 2022-02-03 NOTE — ANESTHESIA POSTPROCEDURE EVALUATION
Procedure(s):  INSERT PPM DUAL. total IV anesthesia    Anesthesia Post Evaluation        Patient location during evaluation: PACU  Patient participation: complete - patient participated  Level of consciousness: awake  Pain management: satisfactory to patient  Airway patency: patent  Anesthetic complications: no  Cardiovascular status: hemodynamically stable  Respiratory status: spontaneous ventilation  Hydration status: euvolemic  Post anesthesia nausea and vomiting:  none      INITIAL Post-op Vital signs:   Vitals Value Taken Time   /55 02/03/22 1640   Temp 36.9 °C (98.4 °F) 02/03/22 1559   Pulse 61 02/03/22 1645   Resp 22 02/03/22 1600   SpO2 95 % 02/03/22 1625   Vitals shown include unvalidated device data.

## 2022-02-03 NOTE — CONSULTS
[facility] Initial Cardiac Evaluation                Date of  Admission: 2/2/2022  8:07 PM     Primary Care Physician: Tiney Schilder, NP  Primary Cardiologist: none  Referring Physician: ED physician  Supervising Physician: Dr. Bari Nuñez    HPI: bradycardia      Mara Tamez is a 80 y.o. male with past medical history of vascular dementia and DM2 who presented to the ED from assisted living facility after a fall. His daughter is present at the bedside and states that the patient was reportedly looking out of his window and fell when turning back around. She reports that the patient is significantly confused at baseline. XRs done in the ER show a left femoral neck fracture. Patient was placed on cardiac monitor in the ER with EKG showing possible SB w Mobitz 1 vs. 2. He is currently hemodynamically stable with SBP in the 120s. Daughter reports that he takes midodrine to help with BP. We have been asked to see patient in consultation regarding bradycardia. Past Medical History:   Diagnosis Date    Diabetes ([facility]     Vascular dementia ([facility]       History reviewed. No pertinent surgical history. No Known Allergies   Family History   Problem Relation Age of Onset    No Known Problems Mother     No Known Problems Father         No current facility-administered medications for this encounter. Current Outpatient Medications   Medication Sig    aspirin delayed-release 81 mg tablet Take 81 mg by mouth.  glipiZIDE (GLUCOTROL) 5 mg tablet Take  by mouth daily.  midodrine (PROAMITINE) 5 mg tablet Take  by mouth three (3) times daily.  ferrous sulfate (IRON) 325 mg (65 mg iron) tablet Take  by mouth Daily (before breakfast).  pyridoxine, vitamin B6, (VITAMIN B-6) 100 mg tablet Take 100 mg by mouth daily.  pravastatin (PRAVACHOL) 20 mg tablet Take 20 mg by mouth nightly.  ascorbic acid, vitamin C, (VITAMIN C) 500 mg tablet Take  by mouth.     fluticasone (FLONASE) 50 mcg/actuation nasal spray 2 Sprays by Both Nostrils route daily. Review of Systems   Unable to perform ROS: Dementia        Physical Exam  Vitals:    02/02/22 2345 02/02/22 2347 02/03/22 0002 02/03/22 0015   BP: 119/70   125/76   Pulse:  (!) 38  (!) 51   Resp: 12   20   Temp:       SpO2:   100%    Weight:       Height:           Physical Exam:  Physical Exam  HENT:      Mouth/Throat:      Mouth: Mucous membranes are moist.   Eyes:      Pupils: Pupils are equal, round, and reactive to light. Cardiovascular:      Rate and Rhythm: Regular rhythm. Bradycardia present. Heart sounds: Normal heart sounds. Pulmonary:      Breath sounds: Normal breath sounds. Abdominal:      General: Bowel sounds are normal.   Musculoskeletal:         General: No swelling. Skin:     General: Skin is warm and dry. Neurological:      Mental Status: He is alert. Mental status is at baseline. Psychiatric:         Mood and Affect: Mood normal.         Cardiographics    Telemetry: sinus bradycardia with 2nd degree AV block  ECG: sinus bradycardia, 2nd degree AV block  Echocardiogram: ordered    Labs:   Recent Labs     02/02/22 2025      K 3.8   BUN 22   CREA 1.00   *   WBC 4.5   HGB 12.6*   HCT 37.2*           Assessment/Plan:     Assessment:          Closed displaced fracture of left femoral neck -- ortho to see patient in the AM      Sinus bradycardia -- duration of bradycardia unknown. Not on AV joce blocking agents as outpatient. Labs WNL. NPO after midnight. Will have EP see patient in the AM. Check echocardiogram. If patient does in fact need a PM, patient would possibly need a MICRA device given his severe dementia. Vascular dementia       Diabetes mellitus type 2, controlled -- per primary      Thank you very much for this referral. We appreciate the opportunity to participate in this patient's care. We will follow along with above stated plan.     Donald Martinez NP  Consulting MD:Joon

## 2022-02-03 NOTE — PROGRESS NOTES
Pt admitted through the ER with (L) femoral head fracture and SB. Pt sleeping. Pt's daughter at bedside and answered questions to the assessment. Pt moved into AL in Sept. '21 due to his dementia. Pt is independent of his ADLs and ambulates without any assistive devices. Pt has no DME. Pt is affords his medications with his medicare benefit. Pt scheduled for PPI 2/3 and orthopedics scheduled pt for (L) hemiarthroplasty 2/4. CM offered pt's daughter a list of STR placement. CM will continue to follow for ongoing discharge needs.

## 2022-02-03 NOTE — PROGRESS NOTES
Hospitalist Progress note   Admit Date:  2022  8:07 PM   Name:  Thang Becerra   Age:  80 y.o. Sex:  male  :  1935   MRN:  [id number]   Room:  [facility]/    Presenting Complaint: Hip Pain    Reason(s) for Admission: Sinus bradycardia [R00.1]  Closed displaced fracture of left femoral neck (HCC) [S72.002A]     Interval Hx/Subjective:   Thang Becerra is a 80 y.o. male with a h/o vascular dementia and DM admitted on  with left femoral neck fracture following a fall. Patient was found to be in Mobitz type II heart block for which cardiology was notified and patient is being scheduled for pacemaker this afternoon. X-rays show a L femoral neck fracture. He was incidentally noted to have a sinus bradycardia in the 30s-40s so Cardiology was notified. At the bedside it appears c/w Mobitz 2.     3:  Patient seen at bedside, resting comfortably in bed. Patient's daughter is present at bedside, discussed about plan for pacemaker placement today and hip fracture surgery tomorrow. No fever, vomiting, chest pain, headache. Review of Systems:  10 point review system is negative except for what mentioned above  Assessment & Plan:   # L femoral neck fracture  2/3:  Plan for ORIF on   N.p.o. after midnight. Pain is optimally controlled with morphine and Flexeril as needed. # Sinus bradycardia  /3:  EKG with Mobitz type II, plan for pacemaker this afternoon. Cardiology on board, appreciate recommendations. # DM2  Glucose is optimally controlled, continue NovoLog sliding scale. # Vascular dementia  Stable. Dispo/Discharge Planning: Pending. PT/OT/PPD.   Diet: DIET NPO  VTE ppx: SCDs  Code status: DNR    Hospital Problems as of 2/3/2022 Date Reviewed: 2022          Codes Class Noted - Resolved POA    Sinus bradycardia ICD-10-CM: R00.1  ICD-9-CM: 427.89  2022 - Present Yes        * (Principal) Closed displaced fracture of left femoral neck ([address] ICD-10-CM: I91.578C  ICD-9-CM: 820.8  2022 - Present Yes        Vascular dementia [facility]) ICD-10-CM: F01.50  ICD-9-CM: 290.40  2/2/2022 - Present Yes        Diabetes mellitus type 2, controlled ([facility] ICD-10-CM: E11.9  ICD-9-CM: 250.00  2/2/2022 - Present Unknown                Objective:     Patient Vitals for the past 24 hrs:   Temp Pulse Resp BP SpO2   02/03/22 0524 98.6 °F (37 °C) (!) 42 18 (!) 105/49 99 %   02/03/22 0244 99 °F (37.2 °C) 72 18 (!) 115/58 100 %   02/03/22 0202 -- (!) 46 -- -- --   02/03/22 0201 -- -- 14 -- --   02/03/22 0200 -- -- -- (!) 128/46 --   02/03/22 0143 -- -- -- -- 100 %   02/03/22 0015 -- (!) 51 20 125/76 --   02/03/22 0002 -- -- -- -- 100 %   02/02/22 2347 -- (!) 38 -- -- --   02/02/22 2345 -- -- 12 119/70 --   02/02/22 2315 -- (!) 41 12 121/75 --   02/02/22 2252 -- -- -- -- 100 %   02/02/22 2150 -- -- -- -- 94 %   02/02/22 2146 -- (!) 45 14 (!) 123/59 --   02/02/22 2021 97.7 °F (36.5 °C) (!) 46 17 101/60 94 %   02/02/22 2011 98 °F (36.7 °C) -- 17 -- 96 %     Oxygen Therapy  O2 Sat (%): 99 % (02/03/22 0524)  Pulse via Oximetry: 46 beats per minute (02/03/22 0143)  O2 Device: Nasal cannula (02/03/22 0244)  O2 Flow Rate (L/min): 2 l/min (02/03/22 0244)    Estimated body mass index is 22.85 kg/m² as calculated from the following:    Height as of this encounter: 5' 7\" (1.702 m). Weight as of this encounter: 66.2 kg (145 lb 14.4 oz). Intake/Output Summary (Last 24 hours) at 2/3/2022 0746  Last data filed at 2/3/2022 0603  Gross per 24 hour   Intake 0 ml   Output 150 ml   Net -150 ml         Physical Exam:  Blood pressure (!) 105/49, pulse (!) 42, temperature 98.6 °F (37 °C), resp. rate 18, height 5' 7\" (1.702 m), weight 66.2 kg (145 lb 14.4 oz), SpO2 99 %. General:    Elderly, alert awake, pleasantly confused, NAD, on room air  HEENT:           Head NCAT, PERRLA positive, MMM  Neck:  No restricted ROM. Trachea midline   CV:   Bradycardic, regular rhythm. No m/r/g. No jugular venous distension.   Lungs:   CTA B  Abdomen: Bowel sounds present. Soft, nontender, nondistended. Extremities: No cyanosis or clubbing. Mild b/l LE pitting edema. LLE is shortened and externally rotated. Skin:     No rashes and normal coloration. Warm and dry. Neuro:  CN II-XII grossly intact. Sensation intact. A&Ox3  Psych:  Normal mood and affect. I have reviewed ordered lab tests and independently visualized imaging below:    Last 24hr Labs:  Recent Results (from the past 24 hour(s))   EKG, 12 LEAD, INITIAL    Collection Time: 02/02/22  8:23 PM   Result Value Ref Range    Ventricular Rate 35 BPM    Atrial Rate 0 BPM    QRS Duration 97 ms    Q-T Interval 540 ms    QTC Calculation (Bezet) 412 ms    Calculated R Axis 68 degrees    Calculated T Axis 60 degrees    Diagnosis       markd sinus jose with firt degree avb  Low voltage, precordial leads  Nonspecific T abnormalities, lateral leads    Confirmed by Radu Rios MD (), BASSEM HARRIS ([id number]) on 2/3/2022 6:16:07 AM     METABOLIC PANEL, COMPREHENSIVE    Collection Time: 02/02/22  8:25 PM   Result Value Ref Range    Sodium 142 136 - 145 mmol/L    Potassium 3.8 3.5 - 5.1 mmol/L    Chloride 106 98 - 107 mmol/L    CO2 32 21 - 32 mmol/L    Anion gap 4 (L) 7 - 16 mmol/L    Glucose 146 (H) 65 - 100 mg/dL    BUN 22 8 - 23 MG/DL    Creatinine 1.00 0.8 - 1.5 MG/DL    GFR est AA >60 >60 ml/min/1.73m2    GFR est non-AA >60 >60 ml/min/1.73m2    Calcium 9.2 8.3 - 10.4 MG/DL    Bilirubin, total 0.8 0.2 - 1.1 MG/DL    ALT (SGPT) 20 12 - 65 U/L    AST (SGOT) 17 15 - 37 U/L    Alk.  phosphatase 85 50 - 136 U/L    Protein, total 6.7 6.3 - 8.2 g/dL    Albumin 3.5 3.2 - 4.6 g/dL    Globulin 3.2 2.3 - 3.5 g/dL    A-G Ratio 1.1 (L) 1.2 - 3.5     CBC WITH AUTOMATED DIFF    Collection Time: 02/02/22  8:25 PM   Result Value Ref Range    WBC 4.5 4.3 - 11.1 K/uL    RBC 4.19 (L) 4.23 - 5.6 M/uL    HGB 12.6 (L) 13.6 - 17.2 g/dL    HCT 37.2 (L) 41.1 - 50.3 %    MCV 88.8 79.6 - 97.8 FL    MCH 30.1 26.1 - 32.9 PG    MCHC 33.9 31.4 - 35.0 g/dL    RDW 12.3 11.9 - 14.6 %    PLATELET 333 623 - 064 K/uL    MPV 10.3 9.4 - 12.3 FL    ABSOLUTE NRBC 0.00 0.0 - 0.2 K/uL    DF AUTOMATED      NEUTROPHILS 70 43 - 78 %    LYMPHOCYTES 19 13 - 44 %    MONOCYTES 9 4.0 - 12.0 %    EOSINOPHILS 1 0.5 - 7.8 %    BASOPHILS 0 0.0 - 2.0 %    IMMATURE GRANULOCYTES 1 0.0 - 5.0 %    ABS. NEUTROPHILS 3.2 1.7 - 8.2 K/UL    ABS. LYMPHOCYTES 0.9 0.5 - 4.6 K/UL    ABS. MONOCYTES 0.4 0.1 - 1.3 K/UL    ABS. EOSINOPHILS 0.0 0.0 - 0.8 K/UL    ABS. BASOPHILS 0.0 0.0 - 0.2 K/UL    ABS. IMM.  GRANS. 0.0 0.0 - 0.5 K/UL   TYPE & SCREEN    Collection Time: 02/02/22  8:25 PM   Result Value Ref Range    Crossmatch Expiration 02/05/2022,2359     ABO/Rh(D) Valarie Paniaguam POSITIVE     Antibody screen NEG    TSH 3RD GENERATION    Collection Time: 02/02/22  8:25 PM   Result Value Ref Range    TSH 4.540 (H) 0.358 - 3.740 uIU/mL   PHOSPHORUS    Collection Time: 02/02/22  8:25 PM   Result Value Ref Range    Phosphorus 2.9 2.3 - 3.7 MG/DL   EKG, 12 LEAD, SUBSEQUENT    Collection Time: 02/02/22  8:37 PM   Result Value Ref Range    Ventricular Rate 36 BPM    Atrial Rate 73 BPM    P-R Interval 269 ms    QRS Duration 139 ms    Q-T Interval 477 ms    QTC Calculation (Bezet) 369 ms    Calculated P Axis 71 degrees    Calculated R Axis 72 degrees    Calculated T Axis 45 degrees    Diagnosis       Sinus bradycardia with mobitz 1  pacs  Prolonged ME interval  Nonspecific intraventricular conduction delay    Confirmed by [facility]  MD ()BASSEM ([id number]) on 2/3/2022 7:16:45 AM     GLUCOSE, POC    Collection Time: 02/03/22  2:45 AM   Result Value Ref Range    Glucose (POC) 147 (H) 65 - 100 mg/dL    Performed by Yash Sanders        All Micro Results     Procedure Component Value Units Date/Time    MSSA/MRSA SC BY PCR, NASAL SWAB [[id number]]     Order Status: Sent Specimen: Nasal swab     MSSA/MRSA SC BY PCR, NASAL SWAB [[id number]]     Order Status: Sent Specimen: Nasal swab           Other Studies:  XR HIP LT W OR WO PELV 2-3 VWS    Result Date: 2/2/2022  EXAM: Pelvis and left hip x-rays. INDICATION: Left hip pain after falling. COMPARISON: None. TECHNIQUE: A frontal view of the pelvis was supplemented with 2 dedicated views of the left hip joint. FINDINGS: There is a displaced subcapital left femoral neck fracture. No other fractures are identified. The pelvic ring is intact. Left femoral neck fracture. XR FEMUR LT 2 V    Result Date: 2/2/2022  EXAM: Left femur x-rays. INDICATION: Pain after falling. COMPARISON: Subsequent dedicated left hip x-rays. TECHNIQUE: 4 views. FINDINGS: As noted on the hip x-ray report, there is a displaced subcapital femoral neck fracture. No fractures are seen in the more distal left femur. Note is made of vascular calcifications in the soft tissues. As above. XR CHEST PORT    Result Date: 2/2/2022  EXAM: Chest x-ray. INDICATION: Presurgical evaluation, hip fracture. COMPARISON: None. TECHNIQUE: Single frontal view. FINDINGS: The lungs are clear. The cardiac size, mediastinal contour and pulmonary vasculature are normal. No pneumothorax or pleural effusion is seen. There are atherosclerotic calcifications in the aortic arch and mild degenerative changes in the spine. No acute displaced fracture is seen. No acute intrathoracic process. Medications Administered     fentaNYL citrate (PF) injection 50 mcg     Admin Date  02/02/2022 Action  Given Dose  50 mcg Route  IntraVENous Administered By  Ang Johnson RN          ondansetron [facility]) injection 4 mg     Admin Date  02/02/2022 Action  Given Dose  4 mg Route  IntraVENous Administered By  nAg Johnson RN                Signed:  Blayne Zamora MD    Part of this note may have been written by using a voice dictation software. The note has been proof read but may still contain some grammatical/other typographical errors.

## 2022-02-03 NOTE — ED PROVIDER NOTES
59-year-old male with history of dementia presents from assisted living after a fall. Family states he believes he got tangled up in his roommates cane and fell. He arrived to the heart rate in the 30s. Family denies known history of bradycardia. Left hip externally rotated and shortened. The history is limited by the condition of the patient. Hip Injury          No past medical history on file. No past surgical history on file. No family history on file. Social History     Socioeconomic History    Marital status:      Spouse name: Not on file    Number of children: Not on file    Years of education: Not on file    Highest education level: Not on file   Occupational History    Not on file   Tobacco Use    Smoking status: Never Smoker    Smokeless tobacco: Never Used   Substance and Sexual Activity    Alcohol use: No    Drug use: Not on file    Sexual activity: Not on file   Other Topics Concern    Not on file   Social History Narrative    Not on file     Social Determinants of Health     Financial Resource Strain:     Difficulty of Paying Living Expenses: Not on file   Food Insecurity:     Worried About Running Out of Food in the Last Year: Not on file    Steven of Food in the Last Year: Not on file   Transportation Needs:     Lack of Transportation (Medical): Not on file    Lack of Transportation (Non-Medical):  Not on file   Physical Activity:     Days of Exercise per Week: Not on file    Minutes of Exercise per Session: Not on file   Stress:     Feeling of Stress : Not on file   Social Connections:     Frequency of Communication with Friends and Family: Not on file    Frequency of Social Gatherings with Friends and Family: Not on file    Attends Yazidi Services: Not on file    Active Member of Clubs or Organizations: Not on file    Attends Club or Organization Meetings: Not on file    Marital Status: Not on file   Intimate Partner Violence:     Fear of Current or Ex-Partner: Not on file    Emotionally Abused: Not on file    Physically Abused: Not on file    Sexually Abused: Not on file   Housing Stability:     Unable to Pay for Housing in the Last Year: Not on file    Number of Places Lived in the Last Year: Not on file    Unstable Housing in the Last Year: Not on file         ALLERGIES: Patient has no known allergies. Review of Systems   Unable to perform ROS: Dementia   Musculoskeletal: Positive for arthralgias. Vitals:    02/02/22 2011 02/02/22 2021   BP:  101/60   Pulse:  (!) 46   Resp: 17 17   Temp: 98 °F (36.7 °C) 97.7 °F (36.5 °C)   SpO2: 96% 94%   Weight: 74.8 kg (165 lb)    Height: 5' 8\" (1.727 m)             Physical Exam  Vitals and nursing note reviewed. Constitutional:       Appearance: Normal appearance. He is well-developed. HENT:      Head: Normocephalic and atraumatic. Nose: Nose normal.      Mouth/Throat:      Mouth: Mucous membranes are moist.   Eyes:      Pupils: Pupils are equal, round, and reactive to light. Cardiovascular:      Rate and Rhythm: Regular rhythm. Bradycardia present. Heart sounds: Heart sounds are distant. Pulmonary:      Effort: Pulmonary effort is normal.      Breath sounds: Normal breath sounds. Abdominal:      Palpations: Abdomen is soft. Tenderness: There is no abdominal tenderness. Musculoskeletal:      Cervical back: Normal range of motion and neck supple. Left hip: Deformity and tenderness present. Decreased range of motion. Skin:     General: Skin is warm and dry. Neurological:      General: No focal deficit present. Mental Status: He is alert. Mental status is at baseline. He is disoriented and confused. Psychiatric:         Mood and Affect: Mood normal.         Behavior: Behavior is agitated. MDM  Number of Diagnoses or Management Options  Diagnosis management comments: Parts of this document were created using dragon voice recognition software.  The chart has been reviewed but errors may still be present. I wore appropriate PPE throughout this patient's ED visit. Ammy Garcia MD, 8:39 PM    Discussed with cardiology, Dr. Elen Watkins regarding heart rate in the 30s. He is not on any rate reducing medications. No known history of heart disease. He thought it appeared like a second-degree type I heart block that did not require treatment unless he became hypotensive or otherwise symptomatic. Discussed with hospitalist for admission. Ortho consulted for hip fracture. Pain treated. Amount and/or Complexity of Data Reviewed  Clinical lab tests: reviewed and ordered (Results for orders placed or performed during the hospital encounter of 39/03/99  -METABOLIC PANEL, COMPREHENSIVE:        Result                      Value             Ref Range           Sodium                      142               136 - 145 mm*       Potassium                   3.8               3.5 - 5.1 mm*       Chloride                    106               98 - 107 mmo*       CO2                         32                21 - 32 mmol*       Anion gap                   4 (L)             7 - 16 mmol/L       Glucose                     146 (H)           65 - 100 mg/*       BUN                         22                8 - 23 MG/DL        Creatinine                  1.00              0.8 - 1.5 MG*       GFR est AA                  >60               >60 ml/min/1*       GFR est non-AA              >60               >60 ml/min/1*       Calcium                     9.2               8.3 - 10.4 M*       Bilirubin, total            0.8               0.2 - 1.1 MG*       ALT (SGPT)                  20                12 - 65 U/L         AST (SGOT)                  17                15 - 37 U/L         Alk.  phosphatase            85                50 - 136 U/L        Protein, total              6.7               6.3 - 8.2 g/*       Albumin                     3.5               3.2 - 4.6 g/* Globulin                    3.2               2.3 - 3.5 g/*       A-G Ratio                   1.1 (L)           1.2 - 3.5      -CBC WITH AUTOMATED DIFF:        Result                      Value             Ref Range           WBC                         4.5               4.3 - 11.1 K*       RBC                         4.19 (L)          4.23 - 5.6 M*       HGB                         12.6 (L)          13.6 - 17.2 *       HCT                         37.2 (L)          41.1 - 50.3 %       MCV                         88.8              79.6 - 97.8 *       MCH                         30.1              26.1 - 32.9 *       MCHC                        33.9              31.4 - 35.0 *       RDW                         12.3              11.9 - 14.6 %       PLATELET                    174               150 - 450 K/*       MPV                         10.3              9.4 - 12.3 FL       ABSOLUTE NRBC               0.00              0.0 - 0.2 K/*       DF                          AUTOMATED                             NEUTROPHILS                 70                43 - 78 %           LYMPHOCYTES                 19                13 - 44 %           MONOCYTES                   9                 4.0 - 12.0 %        EOSINOPHILS                 1                 0.5 - 7.8 %         BASOPHILS                   0                 0.0 - 2.0 %         IMMATURE GRANULOCYTES       1                 0.0 - 5.0 %         ABS. NEUTROPHILS            3.2               1.7 - 8.2 K/*       ABS. LYMPHOCYTES            0.9               0.5 - 4.6 K/*       ABS. MONOCYTES              0.4               0.1 - 1.3 K/*       ABS. EOSINOPHILS            0.0               0.0 - 0.8 K/*       ABS. BASOPHILS              0.0               0.0 - 0.2 K/*       ABS. IMM.  GRANS.            0.0               0.0 - 0.5 K/*  -TSH 3RD GENERATION:        Result                      Value             Ref Range           TSH                         4.540 (H) 0.358 - 3.74*  -PHOSPHORUS:        Result                      Value             Ref Range           Phosphorus                  2.9               2.3 - 3.7 MG*  -TYPE & SCREEN:        Result                      Value             Ref Range           Crossmatch Expiration                                         02/05/2022,2359       ABO/Rh(D)                   O POSITIVE                            Antibody screen             NEG                              )  Tests in the radiology section of CPT®: ordered and reviewed (XR HIP LT W OR WO PELV 2-3 VWS    Result Date: 2/2/2022  EXAM: Pelvis and left hip x-rays. INDICATION: Left hip pain after falling. COMPARISON: None. TECHNIQUE: A frontal view of the pelvis was supplemented with 2 dedicated views of the left hip joint. FINDINGS: There is a displaced subcapital left femoral neck fracture. No other fractures are identified. The pelvic ring is intact. Left femoral neck fracture. XR FEMUR LT 2 V    Result Date: 2/2/2022  EXAM: Left femur x-rays. INDICATION: Pain after falling. COMPARISON: Subsequent dedicated left hip x-rays. TECHNIQUE: 4 views. FINDINGS: As noted on the hip x-ray report, there is a displaced subcapital femoral neck fracture. No fractures are seen in the more distal left femur. Note is made of vascular calcifications in the soft tissues. As above. XR CHEST PORT    Result Date: 2/2/2022  EXAM: Chest x-ray. INDICATION: Presurgical evaluation, hip fracture. COMPARISON: None. TECHNIQUE: Single frontal view. FINDINGS: The lungs are clear. The cardiac size, mediastinal contour and pulmonary vasculature are normal. No pneumothorax or pleural effusion is seen. There are atherosclerotic calcifications in the aortic arch and mild degenerative changes in the spine. No acute displaced fracture is seen.      No acute intrathoracic process.    )  Tests in the medicine section of CPT®: ordered and reviewed           EKG    Date/Time: 2/2/2022 8:39 PM  Performed by: Tae Saldivar MD  Authorized by: Tae Saldivar MD     ECG reviewed by ED Physician in the absence of a cardiologist: yes    Interpretation:     Interpretation: abnormal    Rate:     ECG rate:  35    ECG rate assessment: bradycardic    Rhythm:     Rhythm: A-V block      Rhythm comment:  MObitZ type one  Ectopy:     Ectopy: none    Conduction:     Conduction: normal    ST segments:     ST segments:  Normal  T waves:     T waves: normal

## 2022-02-03 NOTE — H&P
Hospitalist History and Physical   Admit Date:  2022  8:07 PM   Name:  Juliocesar Blackwell   Age:  80 y.o. Sex:  male  :  1935   MRN:  [id number]   Room:  ER01/    Presenting Complaint: Hip Pain    Reason(s) for Admission: Sinus bradycardia [R00.1]  Closed displaced fracture of left femoral neck ([address] [S72.002A]     History of Present Illness:   Juliocesar Blackwell is a 80 y.o. male with a h/o vascular dementia and DM who presents to the ER today after a fall at his facility. His daughter, Jennifer Boston, is with him and provides the history. Patient lives with a roommate and apparently was ambulating in his room when he either tripped or got tangled in his roommates cane and subsequently fell. There was no suggestion of syncope, dizziness, chest pain or LOC. Prior to today he was in his usual state of health. X-rays show a L femoral neck fracture. He was incidentally noted to have a sinus bradycardia in the 30s-40s so Cardiology was notified. At the bedside it appears c/w Mobitz 2. Patient is HD stable. He is unable to provide ROS due to his dementia. Hospitalist consulted for admission and further management. Review of Systems:  10 systems reviewed and negative except as noted in HPI. Assessment & Plan:   # L femoral neck fracture   - Reportedly due to a mechanical fall. No suggestion of syncope. Pain control, Ortho consult, NPO (though his bradycardia may preclude surgery for now). # Sinus bradycardia   - Bedside rhythm looked like Mobitz type 2. Cardiology was called by ER. Will place on remote tele and place pacer pads. Hemodynamically stable. Not on any AVN blockers. # DM2   - Hold orals. SSI. # Vascular dementia    Dispo/Discharge Planning: Pending. PT/OT/PPD.   Diet: No diet orders on file  VTE ppx: SCDs  Code status: No Order    Hospital Problems as of 2022 Date Reviewed: 2022          Codes Class Noted - Resolved POA    Sinus bradycardia ICD-10-CM: R00.1  ICD-9-CM: 427.89  2022 - Present Yes        * (Principal) Closed displaced fracture of left femoral neck ([facility] ICD-10-CM: L96.360H  ICD-9-CM: 820.8  2/2/2022 - Present Yes        Vascular dementia ([facility] ICD-10-CM: F01.50  ICD-9-CM: 290.40  2/2/2022 - Present Yes        Diabetes mellitus type 2, controlled ([facility] ICD-10-CM: E11.9  ICD-9-CM: 250.00  2/2/2022 - Present Unknown              Past History:  Past Medical History:   Diagnosis Date    Diabetes ([facility]     Vascular dementia ([facility]      History reviewed. No pertinent surgical history. No Known Allergies   Social History     Tobacco Use    Smoking status: Never Smoker    Smokeless tobacco: Never Used   Substance Use Topics    Alcohol use: No      Family History   Problem Relation Age of Onset    No Known Problems Mother     No Known Problems Father       Family history reviewed and negative except as noted above. There is no immunization history on file for this patient.   Prior to Admit Medications:  Current Outpatient Medications   Medication Instructions    ascorbic acid, vitamin C, (VITAMIN C) 500 mg tablet Oral    aspirin delayed-release 81 mg, Oral    ferrous sulfate (IRON) 325 mg (65 mg iron) tablet Oral, DAILY BEFORE BREAKFAST    fluticasone (FLONASE) 50 mcg/actuation nasal spray 2 Sprays, Both Nostrils, DAILY    glipiZIDE (GLUCOTROL) 5 mg tablet Oral, DAILY    midodrine (PROAMITINE) 5 mg tablet Oral, 3 TIMES DAILY    pravastatin (PRAVACHOL) 20 mg, Oral, EVERY BEDTIME    pyridoxine (vitamin B6) (VITAMIN B-6) 100 mg, Oral, DAILY       Objective:     Patient Vitals for the past 24 hrs:   Temp Pulse Resp BP SpO2   02/02/22 2150 -- -- -- -- 94 %   02/02/22 2146 -- (!) 45 14 (!) 123/59 --   02/02/22 2021 97.7 °F (36.5 °C) (!) 46 17 101/60 94 %   02/02/22 2011 98 °F (36.7 °C) -- 17 -- 96 %     Oxygen Therapy  O2 Sat (%): 94 % (02/02/22 2150)  Pulse via Oximetry: 66 beats per minute (02/02/22 2150)  O2 Device: None (Room air) (02/02/22 2021)    Estimated body mass index is 25.09 kg/m² as calculated from the following:    Height as of this encounter: 5' 8\" (1.727 m). Weight as of this encounter: 74.8 kg (165 lb). No intake or output data in the 24 hours ending 02/02/22 2312      Physical Exam:  Blood pressure (!) 123/59, pulse (!) 45, temperature 97.7 °F (36.5 °C), resp. rate 14, height 5' 8\" (1.727 m), weight 74.8 kg (165 lb), SpO2 94 %. General:    Well nourished but thin. No overt distress. Appears stated age. Head:  Normocephalic, atraumatic  Eyes:  Sclerae appear normal.  Pupils equally round. ENT:  Nares appear normal, no drainage. Moist oral mucosa  Neck:  No restricted ROM. Trachea midline   CV:   RRR. No m/r/g. No jugular venous distension. Lungs:   CTAB. No wheezing, rhonchi, or rales. Respirations even, unlabored  Abdomen: Bowel sounds present. Soft, nontender, nondistended. Extremities: No cyanosis or clubbing. Mild b/l LE pitting edema. LLE is shortened and externally rotated. Skin:     No rashes and normal coloration. Warm and dry. Neuro:  CN II-XII grossly intact. Sensation intact. A&Ox3  Psych:  Normal mood and affect.       I have reviewed ordered lab tests and independently visualized imaging below:    Last 24hr Labs:  Recent Results (from the past 24 hour(s))   EKG, 12 LEAD, INITIAL    Collection Time: 02/02/22  8:23 PM   Result Value Ref Range    Ventricular Rate 35 BPM    Atrial Rate 0 BPM    QRS Duration 97 ms    Q-T Interval 540 ms    QTC Calculation (Bezet) 412 ms    Calculated R Axis 68 degrees    Calculated T Axis 60 degrees    Diagnosis       Junctional rhythm  Low voltage, precordial leads  Nonspecific T abnormalities, lateral leads     METABOLIC PANEL, COMPREHENSIVE    Collection Time: 02/02/22  8:25 PM   Result Value Ref Range    Sodium 142 136 - 145 mmol/L    Potassium 3.8 3.5 - 5.1 mmol/L    Chloride 106 98 - 107 mmol/L    CO2 32 21 - 32 mmol/L    Anion gap 4 (L) 7 - 16 mmol/L    Glucose 146 (H) 65 - 100 mg/dL    BUN 22 8 - 23 MG/DL    Creatinine 1.00 0.8 - 1.5 MG/DL    GFR est AA >60 >60 ml/min/1.73m2    GFR est non-AA >60 >60 ml/min/1.73m2    Calcium 9.2 8.3 - 10.4 MG/DL    Bilirubin, total 0.8 0.2 - 1.1 MG/DL    ALT (SGPT) 20 12 - 65 U/L    AST (SGOT) 17 15 - 37 U/L    Alk. phosphatase 85 50 - 136 U/L    Protein, total 6.7 6.3 - 8.2 g/dL    Albumin 3.5 3.2 - 4.6 g/dL    Globulin 3.2 2.3 - 3.5 g/dL    A-G Ratio 1.1 (L) 1.2 - 3.5     CBC WITH AUTOMATED DIFF    Collection Time: 02/02/22  8:25 PM   Result Value Ref Range    WBC 4.5 4.3 - 11.1 K/uL    RBC 4.19 (L) 4.23 - 5.6 M/uL    HGB 12.6 (L) 13.6 - 17.2 g/dL    HCT 37.2 (L) 41.1 - 50.3 %    MCV 88.8 79.6 - 97.8 FL    MCH 30.1 26.1 - 32.9 PG    MCHC 33.9 31.4 - 35.0 g/dL    RDW 12.3 11.9 - 14.6 %    PLATELET 753 322 - 793 K/uL    MPV 10.3 9.4 - 12.3 FL    ABSOLUTE NRBC 0.00 0.0 - 0.2 K/uL    DF AUTOMATED      NEUTROPHILS 70 43 - 78 %    LYMPHOCYTES 19 13 - 44 %    MONOCYTES 9 4.0 - 12.0 %    EOSINOPHILS 1 0.5 - 7.8 %    BASOPHILS 0 0.0 - 2.0 %    IMMATURE GRANULOCYTES 1 0.0 - 5.0 %    ABS. NEUTROPHILS 3.2 1.7 - 8.2 K/UL    ABS. LYMPHOCYTES 0.9 0.5 - 4.6 K/UL    ABS. MONOCYTES 0.4 0.1 - 1.3 K/UL    ABS. EOSINOPHILS 0.0 0.0 - 0.8 K/UL    ABS. BASOPHILS 0.0 0.0 - 0.2 K/UL    ABS. IMM.  GRANS. 0.0 0.0 - 0.5 K/UL   TYPE & SCREEN    Collection Time: 02/02/22  8:25 PM   Result Value Ref Range    Crossmatch Expiration 02/05/2022,2359     ABO/Rh(D) O POSITIVE     Antibody screen NEG    TSH 3RD GENERATION    Collection Time: 02/02/22  8:25 PM   Result Value Ref Range    TSH 4.540 (H) 0.358 - 3.740 uIU/mL   PHOSPHORUS    Collection Time: 02/02/22  8:25 PM   Result Value Ref Range    Phosphorus 2.9 2.3 - 3.7 MG/DL   EKG, 12 LEAD, SUBSEQUENT    Collection Time: 02/02/22  8:37 PM   Result Value Ref Range    Ventricular Rate 36 BPM    Atrial Rate 73 BPM    P-R Interval 269 ms    QRS Duration 139 ms    Q-T Interval 477 ms    QTC Calculation (Bezet) 369 ms    Calculated P Axis 71 degrees Calculated R Axis 72 degrees    Calculated T Axis 45 degrees    Diagnosis       Sinus bradycardia  Supraventricular bigeminy  Prolonged ND interval  Nonspecific intraventricular conduction delay         All Micro Results     None          Other Studies:  XR HIP LT W OR WO PELV 2-3 VWS    Result Date: 2/2/2022  EXAM: Pelvis and left hip x-rays. INDICATION: Left hip pain after falling. COMPARISON: None. TECHNIQUE: A frontal view of the pelvis was supplemented with 2 dedicated views of the left hip joint. FINDINGS: There is a displaced subcapital left femoral neck fracture. No other fractures are identified. The pelvic ring is intact. Left femoral neck fracture. XR FEMUR LT 2 V    Result Date: 2/2/2022  EXAM: Left femur x-rays. INDICATION: Pain after falling. COMPARISON: Subsequent dedicated left hip x-rays. TECHNIQUE: 4 views. FINDINGS: As noted on the hip x-ray report, there is a displaced subcapital femoral neck fracture. No fractures are seen in the more distal left femur. Note is made of vascular calcifications in the soft tissues. As above. XR CHEST PORT    Result Date: 2/2/2022  EXAM: Chest x-ray. INDICATION: Presurgical evaluation, hip fracture. COMPARISON: None. TECHNIQUE: Single frontal view. FINDINGS: The lungs are clear. The cardiac size, mediastinal contour and pulmonary vasculature are normal. No pneumothorax or pleural effusion is seen. There are atherosclerotic calcifications in the aortic arch and mild degenerative changes in the spine. No acute displaced fracture is seen. No acute intrathoracic process.       Medications Administered     fentaNYL citrate (PF) injection 50 mcg     Admin Date  02/02/2022 Action  Given Dose  50 mcg Route  IntraVENous Administered By  Suleiman Lara RN          ondansetron [facility]) injection 4 mg     Admin Date  02/02/2022 Action  Given Dose  4 mg Route  IntraVENous Administered By  Suleiman Lara RN                Signed:  Mariposa Reyes MD    Part of this note may have been written by using a voice dictation software. The note has been proof read but may still contain some grammatical/other typographical errors.

## 2022-02-03 NOTE — ED NOTES
TRANSFER - OUT REPORT:    Verbal report given to [facility] NORBERT WEAVER RN on Thang Becerra  being transferred to 3rd floor bed 325 for routine progression of care       Report consisted of patients Situation, Background, Assessment and   Recommendations(SBAR). Information from the following report(s) SBAR and ED Summary was reviewed with the receiving nurse. Lines:   Peripheral IV 02/02/22 Right Antecubital (Active)        Opportunity for questions and clarification was provided.       Patient transported with:   O2 @ 2 liters

## 2022-02-03 NOTE — PROCEDURES
: Paula Cabrera. Bard Sandy MD    REFERRING: ED physician    Pre-Procedure Diagnosis  1. Documented non-reversible symptomatic bradycardia due to sick sinus syndrome. 2. Complete heart block     Procedure Performed  1. Insertion of a Dual Chamber Pacemaker    Anesthesia: MAC     Estimated Blood Loss: Less than 10 mL     Specimens: * No specimens in log *     Complications: None     Contrast: 15 ml    Fluoroscopy Time:  2.9 minutes    Patient Information and Indications: The procedure, indications, risks, benefits, and alternatives were discussed with the patient and family members, who desired to proceed after questions were answered and informed consent was documented. Procedure: After informed consent was obtained, the patient was brought to the Electrophysiology Laboratory in a fasting state and was prepped and draped in sterile fashion. Prophylactic antibiotic was administered 10 minutes prior to skin incision: refer to anesthesia procedural documentation for full details. Conscious sedation was administered by anesthesia with continuous oxygen saturation measurement and blood pressure measurement. A venogram of the LUE demonstrated a patent left axillary and subclavian without obvious stenosis. Local anesthetic (sensorcaine) was delivered to the left pectoral region. An incision was made parallel to the deltopectoral groove. A subcutaneous pocket was created using blunt dissection and electrocautery, and adequate hemostasis was established. Access x 2 (Micropuncture kit) was obtained in the left axillary vein under ultrasound/fluoroscopic guidance using a modified Seldinger technique. Next, placement of a 6 Fr peel-away sheath over the first guidewire was performed. A permanent RV lead was then advanced under fluoroscopic guidance via the 6 Fr sheath into the RV apex in a stable position with satisfactory sensing and pacing characteristics. The peel away sheath was removed.  A 6 Fr Safe-sheath was then placed in the second guidewire. A permanent pacing lead was advanced under fluoroscopic guidance and positioned in the right atrial appendage. Stable RA appendage position with satisfactory sensing and pacing characteristics was obtained. The sheath was peeled. The leads were sutured to the underlying pectoralis fascia using 2 non-absorbable sutures around each lead's collar. The lead slack and position was assessed under fluoroscopy while securing the lead collars to ensure proper length. Final lead testing via the pacing system analyzer (PSA) demonstrated stable sensing, impedance and pacing thresholds. The lead pins were then cleaned with antibiotic soaked gauze, dried gently, and attached to a new pacemaker generator. Pins were directly observed to pass the tip electrode, and the ring hex wrench screws were secured, and leads tug tested. The device and leads were gently positioned within the pocket after the pocket was irrigated copiously with a saline antimicrobial solution. The device was placed in a submuscular fashion and the muscular layer was closed with interrupted 3-0 Vicryl. The initial wound was closed with multiple layers of absorbable suture (3-0 Monocryl, Quill) followed by skin closure with 3-0 V-Loc. The patient tolerated the procedure well and there were no complications. All sharps and sponges were counted x 2. The patient was transferred to the recovery area in stable condition.     Device and Lead Information  Pulse Generator Model #  Serial # Location Implant/Explant   [id number] Biotronik [id number] Left Pectoral Implant     Lead Model Number  Serial Number Lead position Implant/Explant   RA [id number] Biotronik [id number] RA Appendage Implant   RV [id number] Biotronik [id number] RV [place] Implant     Lead Sensitivity and Threshold  Lead R or P sensitivity (mv) Threshold (V) Threshold PW (msec) Impedance (ohms) Final output Voltage (V) Final PW (msec)   RA          2.0       0.9 0.4 448 3.0 0.4   RV 5.2 0.7 0.4 468 3.0 0.4     Bradycardia Settings  Aldair Mode LRL URL Pace AVD (ms) Sense AVD (ms) Rate Response Mode Switching Mode SW Rate   DDD-CLS 60 120 200 200 On On 160       Impression: 1. Successful implantation and testing of dual chamber pacemaker (MRI conditional). Plan:   -Overnight observation.  -Tentatively plan for orthopedic surgery tomorrow (hip). -Routine CIED instructions.  -Cardiac/EP follow up in 3-4 months or PRN. -Device clinic follow up in 1-2 weeks. Aguila Hernandez.  Poppy Pollock MD, [address]  Clinical Cardiac Electrophysiology  [facility] Cardiology    2/3/2022  3:44 PM

## 2022-02-03 NOTE — PROGRESS NOTES
TRANSFER - IN REPORT:    Verbal report received from [id number] RN(name) on Emilio Sagastume  being received from EP lab(unit) for routine progression of care      Report consisted of patients Situation, Background, Assessment and   Recommendations(SBAR). Information from the following report(s) Procedure Summary was reviewed with the receiving nurse. Opportunity for questions and clarification was provided. Assessment completed upon patients arrival to unit and care assumed.

## 2022-02-03 NOTE — ANESTHESIA PREPROCEDURE EVALUATION
Relevant Problems   NEUROLOGY   (+) Vascular dementia (HCC)      CARDIOVASCULAR   (+) Sinus bradycardia      ENDOCRINE   (+) Diabetes mellitus type 2, controlled ([facility]       Anesthetic History   No history of anesthetic complications            Review of Systems / Medical History  Pertinent labs reviewed    Pulmonary  Within defined limits                 Neuro/Psych         Dementia (alert, name only)     Cardiovascular            Dysrhythmias (bradycardia)   Pacemaker (pacemaker for bradycardia 2/3/22)    Exercise tolerance: <4 METS: Ambulates unassisted. Comments: Echo 2/22:  Left Ventricle: Left ventricle size is normal. Normal wall thickness. Normal wall motion. Normal left ventricular systolic function with a visually estimated EF of 60 - 65%. Normal diastolic function.   Left Atrium: Left atrium is mildly dilated.   Aorta: Aortic root is mildly dilated (4.3 cm). GI/Hepatic/Renal  Within defined limits              Endo/Other    Diabetes: type 2         Other Findings            Physical Exam    Airway  Mallampati: III  TM Distance: 4 - 6 cm  Neck ROM: normal range of motion   Mouth opening: Normal    Comments: Somewhat non-compliant with exam Cardiovascular    Rhythm: regular  Rate: normal         Dental    Dentition: Poor dentition     Pulmonary  Breath sounds clear to auscultation               Abdominal  GI exam deferred       Other Findings            Anesthetic Plan    ASA: 3  Anesthesia type: spinal          Induction: Intravenous  Anesthetic plan and risks discussed with: Patient and Son / Daughter    Daughter on the phone. Pacemaker placed today.
normal...

## 2022-02-03 NOTE — PROGRESS NOTES
Problem: Pressure Injury - Risk of  Goal: *Prevention of pressure injury  Description: Document Monroe Scale and appropriate interventions in the flowsheet. Outcome: Progressing Towards Goal  Note: Pressure Injury Interventions:       Moisture Interventions: Absorbent underpads,Internal/External urinary devices,Minimize layers    Activity Interventions: Pressure redistribution bed/mattress(bed type)    Mobility Interventions: Pressure redistribution bed/mattress (bed type)    Nutrition Interventions: Document food/fluid/supplement intake    Friction and Shear Interventions: Lift team/patient mobility team,Minimize layers                Problem: Falls - Risk of  Goal: *Absence of Falls  Description: Document Mono Fall Risk and appropriate interventions in the flowsheet.   Outcome: Progressing Towards Goal  Note: Fall Risk Interventions:       Mentation Interventions: Adequate sleep, hydration, pain control,Bed/chair exit alarm,Door open when patient unattended,Family/sitter at bedside,Increase mobility,More frequent rounding,Reorient patient,Room close to nurse's station,Toileting rounds,Update white board    Medication Interventions: Assess postural VS orthostatic hypotension,Bed/chair exit alarm,Patient to call before getting OOB,Teach patient to arise slowly    Elimination Interventions: Bed/chair exit alarm,Call light in reach,Patient to call for help with toileting needs,Stay With Me (per policy),Toilet paper/wipes in reach,Urinal in reach    History of Falls Interventions: Bed/chair exit alarm,Investigate reason for fall,Room close to nurse's station

## 2022-02-03 NOTE — ROUTINE PROCESS
0402: MRSA/PCR Specimen collected and and sent to lab per order. 0425: Lab called saying that specimen was not sufficient. Lab sending PCR swabs for re-collect.

## 2022-02-03 NOTE — ROUTINE PROCESS
TRANSFER - IN REPORT:    Verbal report received from [facility], RN on Alan Rosas being received from ED for routine progression of care. Report consisted of patients Situation, Background, Assessment and Recommendations(SBAR). Information from the following report(s) SBAR, Kardex, Intake/Output, MAR and Recent Results was reviewed. Opportunity for questions and clarification was provided. Assessment completed upon patients arrival to unit and care assumed. Patient received to room 325. Patient connected to monitor and assessment completed. Plan of care reviewed. Patient oriented to room and call light. Patient aware to use call light to communicate any chest pain or needs. Admission skin assessment completed with second RN and reveals the following:   - midsternal chest scar. Healed and skin CDI. - lower abd scar. Healed. Skin CDI.  - BLE flakey feet. Toe nails thick and discolored. Heels dry and intact without pressure injury. Skin CDI.  - Sacrum pink but tiffany able. No evidence of pressure injury. Skin CDI. Pt made NPO. Daughter POA and at pt bedside. Admission assessment completed with POA. Asked to bring in [employer] documents for Epic chart.

## 2022-02-03 NOTE — ROUTINE PROCESS
Bedside and Verbal shift change report recevied from [address]. Report included the following information SBAR, Kardex, Intake/Output, MAR and Recent Results.

## 2022-02-03 NOTE — CONSULTS
[facility] Orthopedics  Consultation Note    Patient ID:  Name: Jarocho Alston  MRN: [id number]  AGE: 80 y.o.  : 1935    Date of Consultation:  February 3, 2022  Referring Physician:  Hospitalist     Subjective: Pt complains of left hip pain after sustaining a fall at his assisted living. The patient has underlying dementia and says he feels fine. The patient's daughter provided the history. They presented to the [facility] Emergency Dept on . They were found to have bradycardia and a closed left hip fracture. They were admitted by the hospitalist. The cardiologist has seen the patient and they are planning on placing a pacemaker today. The patient and daughter have no other orthopedic concerns at this time. Past Medical History Includes:   Past Medical History:   Diagnosis Date    Diabetes ([facility] [address]     Vascular dementia ([facility] [address]    , History reviewed. No pertinent surgical history.   Family History:   Family History   Problem Relation Age of Onset    No Known Problems Mother     No Known Problems Father       Social History:   Social History     Tobacco Use    Smoking status: Never Smoker    Smokeless tobacco: Never Used   Substance Use Topics    Alcohol use: No       ALLERGIES: No Known Allergies     Patient Medications    Current Facility-Administered Medications   Medication Dose Route Frequency    ferrous sulfate tablet 325 mg  1 Tablet Oral DAILY WITH BREAKFAST    insulin lispro (HUMALOG) injection   SubCUTAneous AC&HS    sodium chloride (NS) flush 5-40 mL  5-40 mL IntraVENous Q8H    sodium chloride (NS) flush 5-40 mL  5-40 mL IntraVENous PRN    acetaminophen (TYLENOL) tablet 650 mg  650 mg Oral Q8H    tuberculin injection 5 Units  5 Units IntraDERMal ONCE    ondansetron (ZOFRAN) injection 4 mg  4 mg IntraVENous Q6H PRN    morphine injection 1 mg  1 mg IntraVENous Q3H PRN    0.9% sodium chloride infusion  250 mL/hr IntraVENous CONTINUOUS    0.9% sodium chloride infusion  100 mL/hr IntraVENous CONTINUOUS    pravastatin (PRAVACHOL) tablet 20 mg  20 mg Oral QHS    acetaminophen (TYLENOL) tablet 650 mg  650 mg Oral Q4H PRN    cyclobenzaprine (FLEXERIL) tablet 10 mg  10 mg Oral TID PRN    midodrine (PROAMATINE) tablet 10 mg  10 mg Oral TID WITH MEALS         Review of Systems:  Review of systems not obtained due to patient factors. Physical Exam:      General: NAD, Alert   Mental Status: Appropriate   Psych: Normal Affect, Normal Mood    HEENT: Normal Cephalic/Atraumatic, PERRL   Lungs: Respirations even and unlabored, Breath Sounds were clear, no respiratory distress   Vascular: Distal pulses intact, good capillary refill   Skin: No redness, No Rashes, Skin is dry   Musculoskeletal: shortened and externally rotated left hip. NV intact. Calfs are soft bilaterally.  ROM not assessed due to fracture  Lymphatic: No lympahdenopathy, No distal edema   Neuro: No gross deficits   Abdomen: Soft, Non tender, No distension      VITALS:   Patient Vitals for the past 8 hrs:   BP Temp Pulse Resp SpO2 Height Weight   22 0809 (!) 117/51 97 °F (36.1 °C) (!) 41 18 99 % -- --   22 0524 (!) 105/49 98.6 °F (37 °C) (!) 42 18 99 % -- --   22 0244 (!) 115/58 99 °F (37.2 °C) 72 18 100 % 5' 7\" (1.702 m) 145 lb 14.4 oz (66.2 kg)   22 020 -- -- (!) 46 -- -- -- --   22 020 -- -- -- 14 -- -- --   22 020 (!) 128/46 -- -- -- -- -- --   22 0143 -- -- -- -- 100 % -- --    , Temp (24hrs), Av.1 °F (36.7 °C), Min:97 °F (36.1 °C), Max:99 °F (37.2 °C)           Diagnosis   Patient Active Problem List   Diagnosis Code    Sinus bradycardia R00.1    Closed displaced fracture of left femoral neck (HCC) S72.002A    Vascular dementia (HCC) F01.50    Diabetes mellitus type 2, controlled ([facility] E11.9          Assessment     Closed displaced left femoral neck fracture       Medical Decision Making:     X-rays and chart have been reviewed. The patient was discussed with Dr. Alexey Keith. He has a displaced left femoral neck fracture. The patient was functionally independent before this injury per his daughter and was walking on his own. He will require left hip hemiarthroplasty. I discussed this with the patient and his daughter and they would like to proceed with surgery. The patient is awaiting surgery today for a pacemaker. I communicated with Dr. Ernestine Jones and he anticipates the patient being able to proceed with his hip surgery tomorrow. We will tentatively plan for surgery tomorrow with Dr. Mohsen Hart. He will need to be NPO after midnight and off of blood thinners for his hip surgery.            GAURANG Jones  2/3/2022,  9:11 AM

## 2022-02-03 NOTE — ROUTINE PROCESS
Bedside and Verbal shift change report given to Cristiano Coto RN (oncoming nurse) by self (offgoing nurse). Report included the following information SBAR, Kardex, Intake/Output, MAR and Recent Results.

## 2022-02-03 NOTE — PROGRESS NOTES
TRANSFER - OUT REPORT:    Verbal report given to Lowell LANGFORD(name) on Mushtaq Job  being transferred to [facility](unit) for routine progression of care       Report consisted of patients Situation, Background, Assessment and   Recommendations(SBAR). Information from the following report(s) Procedure Summary was reviewed with the receiving nurse. Lines:   Peripheral IV 02/02/22 Right Antecubital (Active)   Site Assessment Clean, dry, & intact 02/03/22 0805   Phlebitis Assessment 0 02/03/22 0805   Infiltration Assessment 0 02/03/22 0805   Dressing Status Clean, dry, & intact 02/03/22 0805   Dressing Type Tape;Transparent 02/03/22 0805   Hub Color/Line Status Flushed;Patent;Capped 02/03/22 0244   Alcohol Cap Used No 02/03/22 0244       Peripheral IV 02/03/22 Left Forearm (Active)   Site Assessment Clean, dry, & intact 02/03/22 1435   Dressing Status Clean, dry, & intact 02/03/22 1435   Hub Color/Line Status Infusing 02/03/22 1435        Opportunity for questions and clarification was provided.       Patient transported with:   [place]

## 2022-02-03 NOTE — ROUTINE PROCESS
Pt fidgety and attempting to pull at lines. Pt daughter/POA requesting mitts. Mitts placed bilaterally on patients hands.

## 2022-02-03 NOTE — ANESTHESIA PREPROCEDURE EVALUATION
Relevant Problems   NEUROLOGY   (+) Vascular dementia (HCC)      CARDIOVASCULAR   (+) Sinus bradycardia      ENDOCRINE   (+) Diabetes mellitus type 2, controlled ([facility] [address]       Anesthetic History   No history of anesthetic complications            Review of Systems / Medical History  Pertinent labs reviewed    Pulmonary  Within defined limits                 Neuro/Psych         Dementia (alert, name only)     Cardiovascular            Dysrhythmias (bradycardia)       Exercise tolerance: <4 METS: Ambulates unassisted.      GI/Hepatic/Renal  Within defined limits              Endo/Other    Diabetes: type 2         Other Findings            Physical Exam    Airway  Mallampati: III  TM Distance: 4 - 6 cm  Neck ROM: normal range of motion   Mouth opening: Normal    Comments: Somewhat non-compliant with exam Cardiovascular    Rhythm: regular  Rate: abnormal        Comments: jose Dental    Dentition: Poor dentition     Pulmonary  Breath sounds clear to auscultation               Abdominal  GI exam deferred       Other Findings            Anesthetic Plan    ASA: 3  Anesthesia type: total IV anesthesia          Induction: Intravenous  Anesthetic plan and risks discussed with: Patient and Son / Daughter    Daughter present

## 2022-02-03 NOTE — ED TRIAGE NOTES
Pt to ED via [facility] from assisted living facility. Pt had unwitnessed fall today. Pt c/o left hip pain. Hip is shortened and rotated. Pt has baseline dementia at A&Ox1.

## 2022-02-04 ENCOUNTER — ANESTHESIA (OUTPATIENT)
Dept: SURGERY | Age: 87
DRG: 522 | End: 2022-02-04
Payer: MEDICARE

## 2022-02-04 ENCOUNTER — APPOINTMENT (OUTPATIENT)
Dept: GENERAL RADIOLOGY | Age: 87
DRG: 522 | End: 2022-02-04
Attending: ORTHOPAEDIC SURGERY
Payer: MEDICARE

## 2022-02-04 LAB
ATRIAL RATE: 73 BPM
CALCULATED P AXIS, ECG09: 51 DEGREES
CALCULATED R AXIS, ECG10: 13 DEGREES
CALCULATED T AXIS, ECG11: 0 DEGREES
COVID-19 RAPID TEST, COVR: NOT DETECTED
DIAGNOSIS, 93000: NORMAL
GLUCOSE BLD STRIP.AUTO-MCNC: 100 MG/DL (ref 65–100)
GLUCOSE BLD STRIP.AUTO-MCNC: 108 MG/DL (ref 65–100)
GLUCOSE BLD STRIP.AUTO-MCNC: 157 MG/DL (ref 65–100)
GLUCOSE BLD STRIP.AUTO-MCNC: 179 MG/DL (ref 65–100)
HGB BLD-MCNC: 13.1 G/DL (ref 13.6–17.2)
Lab: NORMAL
MM INDURATION POC: 0 MM (ref 0–5)
PPD POC: NEGATIVE NEGATIVE
Q-T INTERVAL, ECG07: 368 MS
QRS DURATION, ECG06: 84 MS
QTC CALCULATION (BEZET), ECG08: 416 MS
REFERENCE LAB,REFLB: NORMAL
SERVICE CMNT-IMP: ABNORMAL
SERVICE CMNT-IMP: NORMAL
SOURCE, COVRS: NORMAL
TEST DESCRIPTION:,ATST: NORMAL
VENTRICULAR RATE, ECG03: 77 BPM

## 2022-02-04 PROCEDURE — 74011000250 HC RX REV CODE- 250: Performed by: ANESTHESIOLOGY

## 2022-02-04 PROCEDURE — 2709999900 HC NON-CHARGEABLE SUPPLY

## 2022-02-04 PROCEDURE — 77030040361 HC SLV COMPR DVT MDII -B

## 2022-02-04 PROCEDURE — 27236 TREAT THIGH FRACTURE: CPT | Performed by: ORTHOPAEDIC SURGERY

## 2022-02-04 PROCEDURE — 77030007880 HC KT SPN EPDRL BBMI -B: Performed by: ANESTHESIOLOGY

## 2022-02-04 PROCEDURE — 76010000162 HC OR TIME 1.5 TO 2 HR INTENSV-TIER 1: Performed by: ORTHOPAEDIC SURGERY

## 2022-02-04 PROCEDURE — 77030017016 HC DSG ANTIMIC BARR2 S&N -B: Performed by: ORTHOPAEDIC SURGERY

## 2022-02-04 PROCEDURE — 77030008462 HC STPLR SKN PROX J&J -A: Performed by: ORTHOPAEDIC SURGERY

## 2022-02-04 PROCEDURE — 77030003665 HC NDL SPN BBMI -A: Performed by: ANESTHESIOLOGY

## 2022-02-04 PROCEDURE — 0SRS0JA REPLACEMENT OF LEFT HIP JOINT, FEMORAL SURFACE WITH SYNTHETIC SUBSTITUTE, UNCEMENTED, OPEN APPROACH: ICD-10-PCS | Performed by: ORTHOPAEDIC SURGERY

## 2022-02-04 PROCEDURE — 77030013708 HC HNDPC SUC IRR PULS STRY –B: Performed by: ORTHOPAEDIC SURGERY

## 2022-02-04 PROCEDURE — 2709999900 HC NON-CHARGEABLE SUPPLY: Performed by: ORTHOPAEDIC SURGERY

## 2022-02-04 PROCEDURE — 74011250637 HC RX REV CODE- 250/637: Performed by: ORTHOPAEDIC SURGERY

## 2022-02-04 PROCEDURE — 74011250637 HC RX REV CODE- 250/637: Performed by: INTERNAL MEDICINE

## 2022-02-04 PROCEDURE — 72170 X-RAY EXAM OF PELVIS: CPT

## 2022-02-04 PROCEDURE — 85018 HEMOGLOBIN: CPT

## 2022-02-04 PROCEDURE — 76060000034 HC ANESTHESIA 1.5 TO 2 HR: Performed by: ORTHOPAEDIC SURGERY

## 2022-02-04 PROCEDURE — 36415 COLL VENOUS BLD VENIPUNCTURE: CPT

## 2022-02-04 PROCEDURE — 77030018547 HC SUT ETHBND1 J&J -B: Performed by: ORTHOPAEDIC SURGERY

## 2022-02-04 PROCEDURE — 74011000250 HC RX REV CODE- 250: Performed by: INTERNAL MEDICINE

## 2022-02-04 PROCEDURE — 76210000063 HC OR PH I REC FIRST 0.5 HR: Performed by: ORTHOPAEDIC SURGERY

## 2022-02-04 PROCEDURE — 77030003029 HC SUT VCRL J&J -B: Performed by: ORTHOPAEDIC SURGERY

## 2022-02-04 PROCEDURE — 74011000250 HC RX REV CODE- 250: Performed by: REGISTERED NURSE

## 2022-02-04 PROCEDURE — 82962 GLUCOSE BLOOD TEST: CPT

## 2022-02-04 PROCEDURE — 74011250636 HC RX REV CODE- 250/636: Performed by: ORTHOPAEDIC SURGERY

## 2022-02-04 PROCEDURE — 65660000000 HC RM CCU STEPDOWN

## 2022-02-04 PROCEDURE — 77030018673: Performed by: ORTHOPAEDIC SURGERY

## 2022-02-04 PROCEDURE — 74011250636 HC RX REV CODE- 250/636: Performed by: ANESTHESIOLOGY

## 2022-02-04 PROCEDURE — 77030018836 HC SOL IRR NACL ICUM -A: Performed by: ORTHOPAEDIC SURGERY

## 2022-02-04 PROCEDURE — 77030006835 HC BLD SAW SAG STRY -B: Performed by: ORTHOPAEDIC SURGERY

## 2022-02-04 PROCEDURE — 77030002922 HC SUT FBRWRE ARTH -B: Performed by: ORTHOPAEDIC SURGERY

## 2022-02-04 PROCEDURE — 77030040922 HC BLNKT HYPOTHRM STRY -A: Performed by: ANESTHESIOLOGY

## 2022-02-04 PROCEDURE — 94760 N-INVAS EAR/PLS OXIMETRY 1: CPT

## 2022-02-04 PROCEDURE — 87635 SARS-COV-2 COVID-19 AMP PRB: CPT

## 2022-02-04 PROCEDURE — 74011250636 HC RX REV CODE- 250/636: Performed by: INTERNAL MEDICINE

## 2022-02-04 PROCEDURE — 74011000250 HC RX REV CODE- 250: Performed by: ORTHOPAEDIC SURGERY

## 2022-02-04 PROCEDURE — 74011250637 HC RX REV CODE- 250/637: Performed by: HOSPITALIST

## 2022-02-04 PROCEDURE — 74011636637 HC RX REV CODE- 636/637: Performed by: INTERNAL MEDICINE

## 2022-02-04 PROCEDURE — C1776 JOINT DEVICE (IMPLANTABLE): HCPCS | Performed by: ORTHOPAEDIC SURGERY

## 2022-02-04 PROCEDURE — 77010033678 HC OXYGEN DAILY

## 2022-02-04 PROCEDURE — 74011250636 HC RX REV CODE- 250/636: Performed by: REGISTERED NURSE

## 2022-02-04 DEVICE — HIP H4 HEMI UNI BIPLR -- IMPL CAPPED H4: Type: IMPLANTABLE DEVICE | Status: FUNCTIONAL

## 2022-02-04 DEVICE — HIP STEM
Type: IMPLANTABLE DEVICE | Site: HIP | Status: FUNCTIONAL
Brand: OMNIFIT

## 2022-02-04 DEVICE — FEMORAL HEAD
Type: IMPLANTABLE DEVICE | Site: HIP | Status: FUNCTIONAL
Brand: C-TAPER HEAD

## 2022-02-04 DEVICE — BIPOLAR COMPONENT
Type: IMPLANTABLE DEVICE | Site: HIP | Status: FUNCTIONAL
Brand: UHR

## 2022-02-04 RX ORDER — PROPOFOL 10 MG/ML
INJECTION, EMULSION INTRAVENOUS
Status: DISCONTINUED | OUTPATIENT
Start: 2022-02-04 | End: 2022-02-04 | Stop reason: HOSPADM

## 2022-02-04 RX ORDER — BUPIVACAINE HYDROCHLORIDE 7.5 MG/ML
INJECTION, SOLUTION INTRASPINAL
Status: COMPLETED | OUTPATIENT
Start: 2022-02-04 | End: 2022-02-04

## 2022-02-04 RX ORDER — HYDROMORPHONE HYDROCHLORIDE 2 MG/ML
0.5 INJECTION, SOLUTION INTRAMUSCULAR; INTRAVENOUS; SUBCUTANEOUS
Status: DISCONTINUED | OUTPATIENT
Start: 2022-02-04 | End: 2022-02-04 | Stop reason: HOSPADM

## 2022-02-04 RX ORDER — CEFAZOLIN SODIUM/WATER 2 G/20 ML
2 SYRINGE (ML) INTRAVENOUS EVERY 8 HOURS
Status: COMPLETED | OUTPATIENT
Start: 2022-02-04 | End: 2022-02-05

## 2022-02-04 RX ORDER — NALOXONE HYDROCHLORIDE 0.4 MG/ML
0.04 INJECTION, SOLUTION INTRAMUSCULAR; INTRAVENOUS; SUBCUTANEOUS
Status: DISCONTINUED | OUTPATIENT
Start: 2022-02-04 | End: 2022-02-04 | Stop reason: HOSPADM

## 2022-02-04 RX ORDER — HYDROMORPHONE HYDROCHLORIDE 2 MG/ML
0.2 INJECTION, SOLUTION INTRAMUSCULAR; INTRAVENOUS; SUBCUTANEOUS
Status: DISCONTINUED | OUTPATIENT
Start: 2022-02-04 | End: 2022-02-04 | Stop reason: HOSPADM

## 2022-02-04 RX ORDER — ONDANSETRON 2 MG/ML
4 INJECTION INTRAMUSCULAR; INTRAVENOUS
Status: DISCONTINUED | OUTPATIENT
Start: 2022-02-04 | End: 2022-02-08 | Stop reason: HOSPADM

## 2022-02-04 RX ORDER — LIDOCAINE HYDROCHLORIDE 10 MG/ML
0.1 INJECTION INFILTRATION; PERINEURAL AS NEEDED
Status: DISCONTINUED | OUTPATIENT
Start: 2022-02-04 | End: 2022-02-04 | Stop reason: HOSPADM

## 2022-02-04 RX ORDER — ACETAMINOPHEN 325 MG/1
975 TABLET ORAL ONCE
Status: DISCONTINUED | OUTPATIENT
Start: 2022-02-04 | End: 2022-02-04

## 2022-02-04 RX ORDER — ONDANSETRON 2 MG/ML
INJECTION INTRAMUSCULAR; INTRAVENOUS AS NEEDED
Status: DISCONTINUED | OUTPATIENT
Start: 2022-02-04 | End: 2022-02-04 | Stop reason: HOSPADM

## 2022-02-04 RX ORDER — OXYCODONE HYDROCHLORIDE 5 MG/1
5 TABLET ORAL
Status: DISCONTINUED | OUTPATIENT
Start: 2022-02-04 | End: 2022-02-04 | Stop reason: HOSPADM

## 2022-02-04 RX ORDER — SODIUM CHLORIDE 0.9 % (FLUSH) 0.9 %
5-40 SYRINGE (ML) INJECTION AS NEEDED
Status: DISCONTINUED | OUTPATIENT
Start: 2022-02-04 | End: 2022-02-08 | Stop reason: HOSPADM

## 2022-02-04 RX ORDER — MIDAZOLAM HYDROCHLORIDE 1 MG/ML
2 INJECTION, SOLUTION INTRAMUSCULAR; INTRAVENOUS ONCE
Status: DISCONTINUED | OUTPATIENT
Start: 2022-02-04 | End: 2022-02-04 | Stop reason: HOSPADM

## 2022-02-04 RX ORDER — TRANEXAMIC ACID 100 MG/ML
INJECTION, SOLUTION INTRAVENOUS AS NEEDED
Status: DISCONTINUED | OUTPATIENT
Start: 2022-02-04 | End: 2022-02-04 | Stop reason: HOSPADM

## 2022-02-04 RX ORDER — MIDAZOLAM HYDROCHLORIDE 1 MG/ML
2 INJECTION, SOLUTION INTRAMUSCULAR; INTRAVENOUS
Status: DISCONTINUED | OUTPATIENT
Start: 2022-02-04 | End: 2022-02-04 | Stop reason: HOSPADM

## 2022-02-04 RX ORDER — NALOXONE HYDROCHLORIDE 0.4 MG/ML
0.1 INJECTION, SOLUTION INTRAMUSCULAR; INTRAVENOUS; SUBCUTANEOUS AS NEEDED
Status: DISCONTINUED | OUTPATIENT
Start: 2022-02-04 | End: 2022-02-04 | Stop reason: HOSPADM

## 2022-02-04 RX ORDER — MORPHINE SULFATE 4 MG/ML
4 INJECTION INTRAVENOUS
Status: DISCONTINUED | OUTPATIENT
Start: 2022-02-04 | End: 2022-02-04

## 2022-02-04 RX ORDER — SODIUM CHLORIDE, SODIUM LACTATE, POTASSIUM CHLORIDE, CALCIUM CHLORIDE 600; 310; 30; 20 MG/100ML; MG/100ML; MG/100ML; MG/100ML
100 INJECTION, SOLUTION INTRAVENOUS CONTINUOUS
Status: DISCONTINUED | OUTPATIENT
Start: 2022-02-04 | End: 2022-02-04 | Stop reason: HOSPADM

## 2022-02-04 RX ORDER — OXYCODONE HYDROCHLORIDE 5 MG/1
10 TABLET ORAL
Status: DISCONTINUED | OUTPATIENT
Start: 2022-02-04 | End: 2022-02-04 | Stop reason: HOSPADM

## 2022-02-04 RX ORDER — OXYCODONE HYDROCHLORIDE 5 MG/1
5 TABLET ORAL
Status: DISCONTINUED | OUTPATIENT
Start: 2022-02-04 | End: 2022-02-05

## 2022-02-04 RX ORDER — ALBUTEROL SULFATE 0.83 MG/ML
2.5 SOLUTION RESPIRATORY (INHALATION) AS NEEDED
Status: DISCONTINUED | OUTPATIENT
Start: 2022-02-04 | End: 2022-02-04 | Stop reason: HOSPADM

## 2022-02-04 RX ORDER — PROPOFOL 10 MG/ML
INJECTION, EMULSION INTRAVENOUS AS NEEDED
Status: DISCONTINUED | OUTPATIENT
Start: 2022-02-04 | End: 2022-02-04 | Stop reason: HOSPADM

## 2022-02-04 RX ORDER — MORPHINE SULFATE 4 MG/ML
3 INJECTION INTRAVENOUS
Status: DISCONTINUED | OUTPATIENT
Start: 2022-02-04 | End: 2022-02-05

## 2022-02-04 RX ORDER — ACETAMINOPHEN 650 MG/1
650 SUPPOSITORY RECTAL ONCE
Status: DISCONTINUED | OUTPATIENT
Start: 2022-02-04 | End: 2022-02-04

## 2022-02-04 RX ORDER — AMOXICILLIN 250 MG
2 CAPSULE ORAL DAILY
Status: DISCONTINUED | OUTPATIENT
Start: 2022-02-05 | End: 2022-02-08 | Stop reason: HOSPADM

## 2022-02-04 RX ORDER — SODIUM CHLORIDE 0.9 % (FLUSH) 0.9 %
5-40 SYRINGE (ML) INJECTION EVERY 8 HOURS
Status: DISCONTINUED | OUTPATIENT
Start: 2022-02-04 | End: 2022-02-07

## 2022-02-04 RX ORDER — NALOXONE HYDROCHLORIDE 0.4 MG/ML
.2-.4 INJECTION, SOLUTION INTRAMUSCULAR; INTRAVENOUS; SUBCUTANEOUS
Status: DISCONTINUED | OUTPATIENT
Start: 2022-02-04 | End: 2022-02-08 | Stop reason: HOSPADM

## 2022-02-04 RX ORDER — DIPHENHYDRAMINE HYDROCHLORIDE 50 MG/ML
12.5 INJECTION, SOLUTION INTRAMUSCULAR; INTRAVENOUS
Status: DISCONTINUED | OUTPATIENT
Start: 2022-02-04 | End: 2022-02-04 | Stop reason: HOSPADM

## 2022-02-04 RX ORDER — OXYCODONE HYDROCHLORIDE 5 MG/1
10 TABLET ORAL
Status: DISCONTINUED | OUTPATIENT
Start: 2022-02-04 | End: 2022-02-04

## 2022-02-04 RX ORDER — CYCLOBENZAPRINE HCL 10 MG
10 TABLET ORAL 3 TIMES DAILY
Status: DISCONTINUED | OUTPATIENT
Start: 2022-02-04 | End: 2022-02-05

## 2022-02-04 RX ORDER — ONDANSETRON 2 MG/ML
4 INJECTION INTRAMUSCULAR; INTRAVENOUS ONCE
Status: DISCONTINUED | OUTPATIENT
Start: 2022-02-04 | End: 2022-02-04 | Stop reason: HOSPADM

## 2022-02-04 RX ORDER — CEFAZOLIN SODIUM/WATER 2 G/20 ML
2 SYRINGE (ML) INTRAVENOUS
Status: COMPLETED | OUTPATIENT
Start: 2022-02-04 | End: 2022-02-04

## 2022-02-04 RX ORDER — SODIUM CHLORIDE, SODIUM LACTATE, POTASSIUM CHLORIDE, CALCIUM CHLORIDE 600; 310; 30; 20 MG/100ML; MG/100ML; MG/100ML; MG/100ML
75 INJECTION, SOLUTION INTRAVENOUS CONTINUOUS
Status: DISCONTINUED | OUTPATIENT
Start: 2022-02-04 | End: 2022-02-04 | Stop reason: HOSPADM

## 2022-02-04 RX ORDER — FENTANYL CITRATE 50 UG/ML
100 INJECTION, SOLUTION INTRAMUSCULAR; INTRAVENOUS ONCE
Status: DISCONTINUED | OUTPATIENT
Start: 2022-02-04 | End: 2022-02-04 | Stop reason: HOSPADM

## 2022-02-04 RX ORDER — SODIUM CHLORIDE 9 MG/ML
100 INJECTION, SOLUTION INTRAVENOUS CONTINUOUS
Status: DISCONTINUED | OUTPATIENT
Start: 2022-02-04 | End: 2022-02-04

## 2022-02-04 RX ORDER — ASPIRIN 325 MG
325 TABLET, DELAYED RELEASE (ENTERIC COATED) ORAL DAILY
Status: DISCONTINUED | OUTPATIENT
Start: 2022-02-04 | End: 2022-02-08 | Stop reason: HOSPADM

## 2022-02-04 RX ORDER — CEFAZOLIN SODIUM/WATER 2 G/20 ML
2 SYRINGE (ML) INTRAVENOUS ONCE
Status: DISCONTINUED | OUTPATIENT
Start: 2022-02-04 | End: 2022-02-04 | Stop reason: SDUPTHER

## 2022-02-04 RX ORDER — ACETAMINOPHEN 500 MG
1000 TABLET ORAL EVERY 6 HOURS
Status: DISCONTINUED | OUTPATIENT
Start: 2022-02-05 | End: 2022-02-04

## 2022-02-04 RX ADMIN — SODIUM CHLORIDE 100 ML/HR: 900 INJECTION, SOLUTION INTRAVENOUS at 21:00

## 2022-02-04 RX ADMIN — CEFAZOLIN SODIUM 2 G: 100 INJECTION, POWDER, LYOPHILIZED, FOR SOLUTION INTRAVENOUS at 17:06

## 2022-02-04 RX ADMIN — ACETAMINOPHEN 650 MG: 325 TABLET ORAL at 06:10

## 2022-02-04 RX ADMIN — INSULIN LISPRO 2 UNITS: 100 INJECTION, SOLUTION INTRAVENOUS; SUBCUTANEOUS at 17:05

## 2022-02-04 RX ADMIN — ASPIRIN 325 MG: 325 TABLET, COATED ORAL at 11:25

## 2022-02-04 RX ADMIN — CYCLOBENZAPRINE 10 MG: 10 TABLET, FILM COATED ORAL at 17:06

## 2022-02-04 RX ADMIN — OXYCODONE HYDROCHLORIDE 5 MG: 5 TABLET ORAL at 21:12

## 2022-02-04 RX ADMIN — SODIUM CHLORIDE, SODIUM LACTATE, POTASSIUM CHLORIDE, AND CALCIUM CHLORIDE 100 ML/HR: 600; 310; 30; 20 INJECTION, SOLUTION INTRAVENOUS at 08:13

## 2022-02-04 RX ADMIN — PHENYLEPHRINE HYDROCHLORIDE 120 MCG: 10 INJECTION INTRAVENOUS at 09:45

## 2022-02-04 RX ADMIN — SODIUM CHLORIDE, PRESERVATIVE FREE 10 ML: 5 INJECTION INTRAVENOUS at 21:00

## 2022-02-04 RX ADMIN — SODIUM CHLORIDE 100 ML/HR: 900 INJECTION, SOLUTION INTRAVENOUS at 01:21

## 2022-02-04 RX ADMIN — SODIUM CHLORIDE, PRESERVATIVE FREE 10 ML: 5 INJECTION INTRAVENOUS at 15:13

## 2022-02-04 RX ADMIN — PHENYLEPHRINE HYDROCHLORIDE 120 MCG: 10 INJECTION INTRAVENOUS at 09:10

## 2022-02-04 RX ADMIN — TRANEXAMIC ACID 1 G: 100 INJECTION, SOLUTION INTRAVENOUS at 08:47

## 2022-02-04 RX ADMIN — INSULIN LISPRO 2 UNITS: 100 INJECTION, SOLUTION INTRAVENOUS; SUBCUTANEOUS at 21:12

## 2022-02-04 RX ADMIN — Medication 1 AMPULE: at 11:27

## 2022-02-04 RX ADMIN — MIDODRINE HYDROCHLORIDE 10 MG: 5 TABLET ORAL at 11:25

## 2022-02-04 RX ADMIN — PROPOFOL 30 MG: 10 INJECTION, EMULSION INTRAVENOUS at 08:28

## 2022-02-04 RX ADMIN — Medication 2 G: at 08:45

## 2022-02-04 RX ADMIN — PROPOFOL 20 MG: 10 INJECTION, EMULSION INTRAVENOUS at 08:29

## 2022-02-04 RX ADMIN — ONDANSETRON 4 MG: 2 INJECTION INTRAMUSCULAR; INTRAVENOUS at 09:25

## 2022-02-04 RX ADMIN — BUPIVACAINE HYDROCHLORIDE IN DEXTROSE 12.5 MG: 7.5 INJECTION, SOLUTION SUBARACHNOID at 08:37

## 2022-02-04 RX ADMIN — MIDODRINE HYDROCHLORIDE 10 MG: 5 TABLET ORAL at 06:10

## 2022-02-04 RX ADMIN — Medication 1 AMPULE: at 21:00

## 2022-02-04 RX ADMIN — SODIUM CHLORIDE, PRESERVATIVE FREE 10 ML: 5 INJECTION INTRAVENOUS at 06:13

## 2022-02-04 RX ADMIN — SODIUM CHLORIDE, PRESERVATIVE FREE 10 ML: 5 INJECTION INTRAVENOUS at 21:13

## 2022-02-04 RX ADMIN — MIDODRINE HYDROCHLORIDE 10 MG: 5 TABLET ORAL at 17:06

## 2022-02-04 RX ADMIN — FERROUS SULFATE TAB 325 MG (65 MG ELEMENTAL FE) 325 MG: 325 (65 FE) TAB at 17:05

## 2022-02-04 RX ADMIN — PROPOFOL 20 MG: 10 INJECTION, EMULSION INTRAVENOUS at 08:32

## 2022-02-04 RX ADMIN — PRAVASTATIN SODIUM 20 MG: 20 TABLET ORAL at 21:00

## 2022-02-04 RX ADMIN — PROPOFOL 140 MCG/KG/MIN: 10 INJECTION, EMULSION INTRAVENOUS at 08:38

## 2022-02-04 RX ADMIN — SODIUM CHLORIDE, PRESERVATIVE FREE 10 ML: 5 INJECTION INTRAVENOUS at 21:01

## 2022-02-04 RX ADMIN — PHENYLEPHRINE HYDROCHLORIDE 120 MCG: 10 INJECTION INTRAVENOUS at 09:30

## 2022-02-04 RX ADMIN — OXYCODONE HYDROCHLORIDE 5 MG: 5 TABLET ORAL at 15:13

## 2022-02-04 RX ADMIN — CYCLOBENZAPRINE 10 MG: 10 TABLET, FILM COATED ORAL at 21:00

## 2022-02-04 NOTE — PERIOP NOTES
TRANSFER - OUT REPORT:    Verbal report given to DIAZ ANDRADE [facility]   on [facility]  being transferred to [facility]   for routine post - op       Report consisted of patients Situation, Background, Assessment and   Recommendations(SBAR). Information from the following report(s) OR Summary, Procedure Summary, Intake/Output and MAR was reviewed with the receiving nurse. Lines:   Peripheral IV 02/04/22 Left Hand (Active)   Site Assessment Clean, dry, & intact 02/04/22 1010   Phlebitis Assessment 0 02/04/22 1010   Infiltration Assessment 0 02/04/22 1010   Dressing Status Clean, dry, & intact 02/04/22 1010   Dressing Type Tape;Transparent 02/04/22 1010   Hub Color/Line Status Patent 02/04/22 1010        Opportunity for questions and clarification was provided. Patient transported with:   O2 @ 3 liters    VTE prophylaxis orders have not been written for Sherrlyn Lat. Patient and family given floor number and nurses name. Family updated re: pt status after security code verified.

## 2022-02-04 NOTE — ANESTHESIA PROCEDURE NOTES
Spinal Block    Start time: 2/4/2022 8:32 AM  End time: 2/4/2022 8:37 AM  Performed by: Amado Edgar MD  Authorized by: Amado Edgar MD     Pre-procedure:   Indications: primary anesthetic  Preanesthetic Checklist: patient identified, risks and benefits discussed, anesthesia consent, site marked, patient being monitored and timeout performed    Timeout Time: 18:32 EST          Spinal Block:   Patient Position:  Seated  Prep Region:  Lumbar  Prep: chlorhexidine      Location:  L3-4  Technique:  Single shot    Local Dose (mL):  3    Needle:   Needle Type:  Pencan  Needle Gauge:  25 G  Attempts:  1      Events: CSF confirmed, no blood with aspiration and no paresthesia        Assessment:  Insertion:  Uncomplicated  Patient tolerance:  Patient tolerated the procedure well with no immediate complications

## 2022-02-04 NOTE — ANESTHESIA POSTPROCEDURE EVALUATION
Procedure(s):  LEFT HIP HEMIARTHROPLASTY CHOICE ANES  ROOM 1105. spinal    Anesthesia Post Evaluation      Multimodal analgesia: multimodal analgesia used between 6 hours prior to anesthesia start to PACU discharge  Patient location during evaluation: PACU  Patient participation: complete - patient participated  Level of consciousness: awake and confused  Pain management: adequate  Airway patency: patent  Anesthetic complications: no  Cardiovascular status: acceptable  Respiratory status: acceptable  Hydration status: acceptable  Post anesthesia nausea and vomiting:  controlled      INITIAL Post-op Vital signs:   Vitals Value Taken Time   /62 02/04/22 1033   Temp 36.6 °C (97.9 °F) 02/04/22 1010   Pulse 53 02/04/22 1034   Resp 16 02/04/22 1023   SpO2 98 % 02/04/22 1034   Vitals shown include unvalidated device data.

## 2022-02-04 NOTE — OP NOTES
Operative Report    Patient: Alan Rosas MRN: [id number]  SSN: [id number]    YOB: 1935  Age: 80 y.o. Sex: male       Date of Surgery: 2/4/2022     Preoperative Diagnosis: Closed left hip fracture, initial encounter ([facility] [S72.002A]     Postoperative Diagnosis: Closed left hip fracture, initial encounter ([facility] [S72.002A]     Surgeon(s) and Role:     Chana Person MD - Primary    Anesthesia: General     Procedure: Procedure(s):  LEFT HIP HEMIARTHROPLASTY CHOICE [facility]  ROOM 1105     Procedure in Detail: Patient was taken to the operating room placed under spinal anesthesia. He was placed in the lateral decubitus position. The left hip prepped draped in sterile fashion. I made a 4 to 5 inch incision over the lateral hip. I incised down to the fascia. The fascia was split longitudinally. I then made an anterolateral approach to the hip incising the anterior one third of the abductors. The femoral neck fracture was visualized and we resected the femoral neck with the saw in standard fashion. The femoral head was removed and sized appropriately. We then used the  to lateralize our starting point on the femur. We then placed the appropriate sized broaches into the femoral canal until we found the proper fit for a press fit prosthesis. The bipolar femoral head trial was placed on the broach. The hip was then reduced this gave us excellent range of motion and stability with approximately equal leg lengths. The trial implant was then removed I was irrigated the wound and placed the true femoral stem into the femoral canal.  The bipolar femoral head was placed on the femoral stem. Again we reduced the hip. This gave us again excellent range of motion and stability. We then irrigated the wound copiously. I closed the deep and superficial layers of the abductors including the capsule with #2 FiberWire suture.   I then closed the fascia with 0 Vicryls the dermis closed with 2-0 Vicryl's. The skin was closed with staples. We then covered this with a sterile dressing. Patient was awakened and taken to recovery in stable condition. There were no complications. Estimated Blood Loss:  150 ml    Tourniquet Time: * No tourniquets in log *      Implants:   Implant Name Type Inv. Item Serial No.  Lot No. LRB No. Used Action   HEAD BPLR OD54MM ID28MM CO CHROM CEMENTLESS UNIV COMPHSVE - [id number]  HEAD BPLR OD54MM ID28MM CO CHROM CEMENTLESS UNIV COMPHSVE  [employer] [facility] [id number] Left 1 Implanted   HEAD FEM [id number] +2.5MM OFFSET HIP CO CHROM C TAPR LIFT - [id number]  HEAD FEM [id number] +2.5MM OFFSET HIP CO CHROM C TAPR LIFT  [employer] [facility] [id number] Left 1 Implanted   STEM BPLR SZ 9 L150MM NK L35MM 41MM OFFSET 132DEG CO CHROME - [id number]  STEM BPLR SZ 9 L150MM NK L35MM 41MM OFFSET 132DEG CO CHROME  [employer] [facility] [id number] Left 1 Implanted               Specimens: * No specimens in log *        Drains: None                Complications: None    Counts: Sponge and needle counts were correct times two.     Signed By:  Rosa Isela Lopez MD     February 4, 2022

## 2022-02-04 NOTE — ROUTINE PROCESS
TRANSFER - IN REPORT:    Verbal report received from Cath lab RN on Dajuan Le being received from Cath lab for routine progression of care. Report consisted of patients Situation, Background, Assessment and Recommendations(SBAR). Opportunity for questions and clarification was provided. Assessment completed upon patients arrival to unit and care assumed. Patient received back to room 325. Patient connected to monitor and assessment completed. Plan of care reviewed. Patient oriented to room and call light. Patient aware to use call light to communicate any chest pain or needs. Admission skin assessment completed with second RN and reveals the following:   Left subclavian site post PPM, sling and lap band to keep arm more immobile in place. Sitter present.

## 2022-02-04 NOTE — ROUTINE PROCESS
Verbal shift change report given to jasmyn (oncoming nurse) by renzo (offgoing nurse). Report included the following information SBAR, Procedure Summary, Intake/Output, MAR and Cardiac Rhythm . Genny Daniel

## 2022-02-04 NOTE — H&P
[facility]      Patient ID:  Name: Neeraj Harper  MRN: [id number]  AGE: 80 y.o.  : 1935        ALLERGIES: No Known Allergies     The patient has left femoral neck fracture. The patient was see and examined and there are no changes to the patient's orthopedic condition. The necessity for the surgery is still present, and the H&P is still current.  The patient will proceed with  Procedure(s) (LRB):  LEFT HIP HEMIARTHROPLASTY CHOICE ANES  ROOM 1105 (Left)            Terese Retana MD  2022,  8:07 AM

## 2022-02-04 NOTE — PROGRESS NOTES
CM met with pt and his daughter in pt's room. Pt's daughter informs CM of SNF preference to [address] [place] [address] (formerly [facility]). CM sent referral.  PPD active. CM will continue to follow for discharge needs.

## 2022-02-04 NOTE — PROGRESS NOTES
TRANSFER - IN REPORT:    Verbal report received from [facility]) on Susanne Farias  being received from [employer]) for routine post - op      Report consisted of patients Situation, Background, Assessment and   Recommendations(SBAR). Information from the following report(s) SBAR and Procedure Summary was reviewed with the receiving nurse. Opportunity for questions and clarification was provided. Assessment completed upon patients arrival to unit and care assumed.

## 2022-02-04 NOTE — PROGRESS NOTES
Hospitalist Progress note   Admit Date:  2022  8:07 PM   Name:  Mayra Garza   Age:  80 y.o. Sex:  male  :  1935   MRN:  [id number]   Room:  /    Presenting Complaint: Hip Pain    Reason(s) for Admission: Sinus bradycardia [R00.1]  Closed displaced fracture of left femoral neck (HCC) [S72.002A]     Interval Hx/Subjective:   Mayra Garza is a 80 y.o. male with a h/o vascular dementia and DM admitted on  with left femoral neck fracture following a fall. Patient was found to be in Mobitz type II heart block for which cardiology was notified and patient is being scheduled for pacemaker this afternoon. X-rays show a L femoral neck fracture. He was incidentally noted to have a sinus bradycardia in the 30s-40s so Cardiology was notified. At the bedside it appears c/w Mobitz 2.     :  Patient is status post pacemaker placement on 2/3. Heart rate is now within normal range. Patient is n.p.o. since midnight for left hip surgery this morning. No reported fever, vomiting, chest pain or diarrhea overnight. Review of Systems:  10 point review system is negative except for what mentioned above  Assessment & Plan:   # L femoral neck fracture  :  Plan for ORIF on   N.p.o. since midnight  Pain control with as needed morphine and oxycodone. Switching Flexeril from as needed to 3 times daily. DVT prophylaxis with aspirin 325 mg p.o. daily x 35 doses  PT/OT eval after surgery    # Sinus bradycardia  : Status post pacemaker placement on 2/3. Heart rate is now within normal range. EKG with Mobitz type II on admission. Echo with normal EF 60 to 65% with normal diastolic dysfunction. # DM2  Glucose is optimally controlled, continue NovoLog sliding scale. # Vascular dementia  Stable. Dispo/Discharge Planning: Pending. PT/OT/PPD. Patient will likely need to go to rehab,  assisting with discharge planning. Diet: ADULT DIET Regular;  Low Sodium (2 gm)  VTE ppx: SCDs  Code status: DNR    Hospital Problems as of 2/4/2022 Date Reviewed: 2/2/2022          Codes Class Noted - Resolved POA    Sinus bradycardia ICD-10-CM: R00.1  ICD-9-CM: 427.89  2/2/2022 - Present Yes        * (Principal) Closed displaced fracture of left femoral neck (HCC) ICD-10-CM: S72.002A  ICD-9-CM: 820.8  2/2/2022 - Present Yes        Vascular dementia ([facility] ICD-10-CM: F01.50  ICD-9-CM: 290.40  2/2/2022 - Present Yes        Diabetes mellitus type 2, controlled ([facility] ICD-10-CM: E11.9  ICD-9-CM: 250.00  2/2/2022 - Present Unknown                Objective:     Patient Vitals for the past 24 hrs:   Temp Pulse Resp BP SpO2   02/04/22 0707 99.3 °F (37.4 °C) (!) 58 16 (!) 160/74 95 %   02/04/22 0610 -- 75 -- -- --   02/04/22 0453 98 °F (36.7 °C) (!) 59 16 139/67 95 %   02/04/22 0400 -- 70 -- -- --   02/04/22 0000 -- 60 -- -- --   02/03/22 2355 97.9 °F (36.6 °C) 60 14 131/67 98 %   02/03/22 2047 98.6 °F (37 °C) 61 18 (!) 147/67 95 %   02/03/22 2000 -- 61 -- -- --   02/03/22 1640 -- 64 -- (!) 115/55 --   02/03/22 1625 -- 70 -- 123/65 95 %   02/03/22 1620 -- 71 -- (!) 132/106 95 %   02/03/22 1610 -- 71 -- 128/62 91 %   02/03/22 1605 -- 79 -- 138/60 94 %   02/03/22 1600 -- 81 22 (!) 123/58 90 %   02/03/22 1559 98.4 °F (36.9 °C) 80 12 135/68 99 %   02/03/22 1155 98.1 °F (36.7 °C) (!) 48 18 (!) 117/55 100 %   02/03/22 0928 -- -- -- (!) 105/49 --   02/03/22 0809 97 °F (36.1 °C) (!) 41 18 (!) 117/51 99 %     Oxygen Therapy  O2 Sat (%): 95 % (02/04/22 0707)  Pulse via Oximetry: 58 beats per minute (02/04/22 0707)  O2 Device: None (Room air) (02/04/22 0707)  O2 Flow Rate (L/min): 1 l/min (02/03/22 2056)    Estimated body mass index is 25.22 kg/m² as calculated from the following:    Height as of this encounter: 5' 7\" (1.702 m). Weight as of this encounter: 73 kg (161 lb).     Intake/Output Summary (Last 24 hours) at 2/4/2022 0719  Last data filed at 2/3/2022 2059  Gross per 24 hour   Intake 500 ml   Output 1970 ml   Net -1470 ml Physical Exam:  Blood pressure (!) 160/74, pulse (!) 58, temperature 99.3 °F (37.4 °C), resp. rate 16, height 5' 7\" (1.702 m), weight 73 kg (161 lb), SpO2 95 %. General:    Elderly, alert awake, on 1 to 2 L via NC, NAD  HEENT:           Head NCAT, PERRLA positive, MMM  Neck:  No restricted ROM. Trachea midline   CV:   Bradycardic, regular rhythm. No m/r/g. No jugular venous distension. Lungs:   CTA B  Abdomen: Bowel sounds present. Soft, nontender, nondistended. Extremities: Left lower extremity with immobilizer  Skin:     No rashes and normal coloration. Warm and dry. Neuro:  CN II-XII grossly intact. Sensation intact. GCS 15.  Psych:  Normal mood and affect.       I have reviewed ordered lab tests and independently visualized imaging below:    Last 24hr Labs:  Recent Results (from the past 24 hour(s))   GLUCOSE, POC    Collection Time: 02/03/22  7:41 AM   Result Value Ref Range    Glucose (POC) 175 (H) 65 - 100 mg/dL    Performed by [facility]    METABOLIC PANEL, BASIC    Collection Time: 02/03/22  9:11 AM   Result Value Ref Range    Sodium 140 138 - 145 mmol/L    Potassium 3.9 3.5 - 5.1 mmol/L    Chloride 108 (H) 98 - 107 mmol/L    CO2 32 21 - 32 mmol/L    Anion gap 0 (L) 7 - 16 mmol/L    Glucose 124 (H) 65 - 100 mg/dL    BUN 18 8 - 23 MG/DL    Creatinine 0.80 0.8 - 1.5 MG/DL    GFR est AA >60 >60 ml/min/1.73m2    GFR est non-AA >60 >60 ml/min/1.73m2    Calcium 9.1 8.3 - 10.4 MG/DL   CBC WITH AUTOMATED DIFF    Collection Time: 02/03/22  9:11 AM   Result Value Ref Range    WBC 7.4 4.3 - 11.1 K/uL    RBC 3.87 (L) 4.23 - 5.6 M/uL    HGB 11.7 (L) 13.6 - 17.2 g/dL    HCT 34.5 (L) 41.1 - 50.3 %    MCV 89.1 79.6 - 97.8 FL    MCH 30.2 26.1 - 32.9 PG    MCHC 33.9 31.4 - 35.0 g/dL    RDW 12.0 11.9 - 14.6 %    PLATELET 575 924 - 439 K/uL    MPV 10.5 9.4 - 12.3 FL    ABSOLUTE NRBC 0.00 0.0 - 0.2 K/uL    DF AUTOMATED      NEUTROPHILS 79 (H) 43 - 78 %    LYMPHOCYTES 10 (L) 13 - 44 %    MONOCYTES 9 4.0 - 12.0 %    EOSINOPHILS 1 0.5 - 7.8 %    BASOPHILS 0 0.0 - 2.0 %    IMMATURE GRANULOCYTES 0 0.0 - 5.0 %    ABS. NEUTROPHILS 5.9 1.7 - 8.2 K/UL    ABS. LYMPHOCYTES 0.8 0.5 - 4.6 K/UL    ABS. MONOCYTES 0.7 0.1 - 1.3 K/UL    ABS. EOSINOPHILS 0.1 0.0 - 0.8 K/UL    ABS. BASOPHILS 0.0 0.0 - 0.2 K/UL    ABS. IMM.  GRANS. 0.0 0.0 - 0.5 K/UL   ECHO ADULT COMPLETE    Collection Time: 02/03/22  9:15 AM   Result Value Ref Range    LA Minor Axis 5.3 cm    LA Major [place] 5.8 cm    LA Area 2C 20.9 cm2    LA Area 4C 18.5 cm2    LA Volume BP 60 (A) 18 - 58 mL    LA Diameter 3.1 cm    LV EDV A4C 99 mL    LV ESV A4C 39 mL    IVSd 1.0 0.6 - 1.0 cm    LVIDd 4.3 4.2 - 5.9 cm    LVIDs 3.1 cm    LVOT Diameter 2.1 cm    LVOT Mean Gradient 1 mmHg    LVOT VTI 19.2 cm    LVOT Peak Velocity 0.8 m/s    LVOT Peak Gradient 2 mmHg    LVPWd 0.9 0.6 - 1.0 cm    LV E' Lateral Velocity 10 cm/s    LV E' Septal Velocity 10 cm/s    LV Ejection Fraction A4C 60 %    LVOT Area 3.5 cm2    LVOT SV 66.5 ml    AV Mean Velocity 0.5 m/s    AV Mean Gradient 1 mmHg    AV VTI 20.2 cm    AV Peak Velocity 0.8 m/s    AV Peak Gradient 2 mmHg    AV Area by VTI 3.3 cm2    AV Area by Peak Velocity 3.4 cm2    Aortic Root 4.3 cm    Ascending Aorta 3.8 cm    MV E Wave Deceleration Time 292.0 ms    MV A Velocity 0.89 m/s    MV E Velocity 0.88 m/s    RV Basal Dimension 3.5 cm    TAPSE 2.4 (A) 1.5 - 2.0 cm    TR Max Velocity 1.73 m/s    TR Peak Gradient 12 mmHg    Fractional Shortening 2D 28 28 - 44 %    LV ESV Index A4C 21 mL/m2    LV EDV Index A4C 53 mL/m2    LVIDd Index 2.31 cm/m2    LVIDs Index 1.67 cm/m2    LV RWT Ratio 0.42     LV Mass 2D 132.7 88 - 224 g    LV Mass 2D Index 71.4 49 - 115 g/m2    MV E/A 0.99     E/E' Ratio (Averaged) 8.80     E/E' Lateral 8.80     E/E' Septal 8.80     LA Volume Index BP 32 16 - 34 ml/m2    LVOT Stroke Volume Index 35.7 mL/m2    LA Size Index 1.67 cm/m2    LA/AO Root Ratio 0.72     Ao Root Index 2.31 cm/m2    Ascending Aorta Index 2.04 cm/m2 AV Velocity Ratio 1.00     LVOT:AV VTI Index 0.95     ANABEL/BSA VTI 1.8 cm2/m2    ANABEL/BSA Peak Velocity 1.8 cm2/m2   GLUCOSE, POC    Collection Time: 02/03/22 10:36 AM   Result Value Ref Range    Glucose (POC) 85 65 - 100 mg/dL    Performed by Angela    EKG, 12 LEAD, INITIAL    Collection Time: 02/03/22  4:05 PM   Result Value Ref Range    Ventricular Rate 77 BPM    Atrial Rate 73 BPM    QRS Duration 84 ms    Q-T Interval 368 ms    QTC Calculation (Bezet) 416 ms    Calculated P Axis 51 degrees    Calculated R Axis 13 degrees    Calculated T Axis 0 degrees    Diagnosis       Ventricular-paced rhythm with frequent AV dual-paced complexes  Abnormal ECG  When compared with ECG of 02-FEB-2022 20:37,  PREVIOUS ECG IS PRESENT     GLUCOSE, POC    Collection Time: 02/03/22  8:49 PM   Result Value Ref Range    Glucose (POC) 114 (H) 65 - 100 mg/dL    Performed by Melita    COVID-19 RAPID TEST    Collection Time: 02/04/22  6:02 AM   Result Value Ref Range    Specimen source NASAL      COVID-19 rapid test Not detected NOTD     PLEASE READ & DOCUMENT PPD TEST IN 24 HRS    Collection Time: 02/04/22  6:41 AM   Result Value Ref Range    PPD Negative Negative    mm Induration 0 0 - 5 mm       All Micro Results     Procedure Component Value Units Date/Time    COVID-19 RAPID TEST [[id number]] Collected: 02/04/22 0602    Order Status: Completed Specimen: Nasopharyngeal Updated: 02/04/22 0700     Specimen source NASAL        COVID-19 rapid test Not detected        Comment:      The specimen is NEGATIVE for SARS-CoV-2, the novel coronavirus associated with COVID-19. A negative result does not rule out COVID-19. This test has been authorized by the FDA under an Emergency Use Authorization (EUA) for use by authorized laboratories.         Fact sheet for Healthcare Providers: ConventionUpdate.co.nz  Fact sheet for Patients: ConventionUpdate.co.nz       Methodology: Isothermal Nucleic Acid Amplification         MSSA/MRSA SC BY PCR, NASAL SWAB [[id number]]     Order Status: Sent Specimen: Nasal swab     MSSA/MRSA SC BY PCR, NASAL SWAB [[id number]]     Order Status: Sent Specimen: Nasal swab           Other Studies:  XR CHEST PORT    Result Date: 2/3/2022  EXAM: CHEST X-RAY, 1 VIEW INDICATION: Evaluate for pneumothorax. COMPARISON: Chest x-ray 2/2/2022 TECHNIQUE: Single AP view of the chest was obtained. FINDINGS: The lungs are clear and the pulmonary vasculature is normal. No pneumothorax or pleural effusion. The cardiomediastinal silhouette is within normal limits. Dual chamber cardiac pacemaker. The osseous structures are unremarkable. No radiographic evidence of acute cardiopulmonary disease. ECHO ADULT COMPLETE    Result Date: 2/3/2022    Left Ventricle: Left ventricle size is normal. Normal wall thickness. Normal wall motion. Normal left ventricular systolic function with a visually estimated EF of 60 - 65%. Normal diastolic function.   Left Atrium: Left atrium is mildly dilated.   Aorta: Aortic root is mildly dilated [4.3 cm].   Technical qualifiers: Echo study was technically difficult. ELECTROPHYSIOLOGY PROCEDURE    Result Date: 2/3/2022  : Tanja Lora. Monica Davis MD   REFERRING: ED physician   Pre-Procedure Diagnosis 1. Documented non-reversible symptomatic bradycardia due to sick sinus syndrome. 2. Complete heart block   Procedure Performed 1. Insertion of a Dual Chamber Pacemaker   Anesthesia: MAC   Estimated Blood Loss: Less than 10 mL       Specimens: * No specimens in log *   Complications: None   Contrast: 15 ml   Fluoroscopy Time:  2.9 minutes   Patient Information and Indications:  The procedure, indications, risks, benefits, and alternatives were discussed with the patient and family members, who desired to proceed after questions were answered and informed consent was documented.   Procedure: After informed consent was obtained, the patient was brought to the Electrophysiology Laboratory in a fasting state and was prepped and draped in sterile fashion. Prophylactic antibiotic was administered 10 minutes prior to skin incision: refer to anesthesia procedural documentation for full details. Conscious sedation was administered by anesthesia with continuous oxygen saturation measurement and blood pressure measurement. A venogram of the LUE demonstrated a patent left axillary and subclavian without obvious stenosis. Local anesthetic (sensorcaine) was delivered to the left pectoral region. An incision was made parallel to the deltopectoral groove. A subcutaneous pocket was created using blunt dissection and electrocautery, and adequate hemostasis was established. Access x 2 (Micropuncture kit) was obtained in the left axillary vein under ultrasound/fluoroscopic guidance using a modified Seldinger technique. Next, placement of a 6 Fr peel-away sheath over the first guidewire was performed. A permanent RV lead was then advanced under fluoroscopic guidance via the 6 Fr sheath into the RV apex in a stable position with satisfactory sensing and pacing characteristics. The peel away sheath was removed. A 6 Fr Safe-sheath was then placed in the second guidewire. A permanent pacing lead was advanced under fluoroscopic guidance and positioned in the right atrial appendage. Stable RA appendage position with satisfactory sensing and pacing characteristics was obtained. The sheath was peeled. The leads were sutured to the underlying pectoralis fascia using 2 non-absorbable sutures around each lead's collar. The lead slack and position was assessed under fluoroscopy while securing the lead collars to ensure proper length. Final lead testing via the pacing system analyzer (PSA) demonstrated stable sensing, impedance and pacing thresholds. The lead pins were then cleaned with antibiotic soaked gauze, dried gently, and attached to a new pacemaker generator.  Pins were directly observed to pass the tip electrode, and the ring hex wrench screws were secured, and leads tug tested. The device and leads were gently positioned within the pocket after the pocket was irrigated copiously with a saline antimicrobial solution. The device was placed in a submuscular fashion and the muscular layer was closed with interrupted 3-0 Vicryl. The initial wound was closed with multiple layers of absorbable suture (3-0 Monocryl, Quill) followed by skin closure with 3-0 V-Loc. The patient tolerated the procedure well and there were no complications. All sharps and sponges were counted x 2. The patient was transferred to the recovery area in stable condition.   Device and Lead Information Pulse Generator Model #  Serial # Location Implant/Explant [id number] Biotronik [id number] Left Pectoral Implant   Lead Model Number  Serial Number Lead position Implant/Explant RA [id number] Biotronik [id number] RA Appendage Implant RV [id number] Biotronik [id number] RV [employer] Implant   Lead Sensitivity and Threshold Lead R or P sensitivity (mv) Threshold (V) Threshold PW (msec) Impedance (ohms) Final output Voltage (V) Final PW (msec) RA          2.0       0.9 0.4 448 3.0 0.4 RV 5.2 0.7 0.4 468 3.0 0.4   Bradycardia Settings [place] Mode LRL URL Pace AVD (ms) Sense AVD (ms) Rate Response Mode Switching Mode SW Rate DDD-CLS 60 120 200 200 On On 160     Impression: 1. Successful implantation and testing of dual chamber pacemaker (MRI conditional).   Plan: -Overnight observation. -Tentatively plan for orthopedic surgery tomorrow (hip). -Routine CIED instructions. -Cardiac/EP follow up in 3-4 months or PRN. -Device clinic follow up in 1-2 weeks.   Diana Gamboa.  Jun Marrero MD, MS Clinical Cardiac Electrophysiology [facility]   2/3/2022 3:44 PM         Medications Administered     fentaNYL citrate (PF) injection 50 mcg     Admin Date  02/02/2022 Action  Given Dose  50 mcg Route  IntraVENous Administered By  Rc Jimenez RN          ondansetron [facility]) injection 4 mg     Admin Date  02/02/2022 Action  Given Dose  4 mg Route  IntraVENous Administered By  Rc Jimenez RN                Signed:  Maxcine Cowden, MD    Part of this note may have been written by using a voice dictation software. The note has been proof read but may still contain some grammatical/other typographical errors.

## 2022-02-05 LAB
GLUCOSE BLD STRIP.AUTO-MCNC: 118 MG/DL (ref 65–100)
GLUCOSE BLD STRIP.AUTO-MCNC: 130 MG/DL (ref 65–100)
GLUCOSE BLD STRIP.AUTO-MCNC: 151 MG/DL (ref 65–100)
GLUCOSE BLD STRIP.AUTO-MCNC: 173 MG/DL (ref 65–100)
HGB BLD-MCNC: 12.1 G/DL (ref 13.6–17.2)
MM INDURATION POC: 0 MM (ref 0–5)
PPD POC: NEGATIVE NEGATIVE
SERVICE CMNT-IMP: ABNORMAL

## 2022-02-05 PROCEDURE — 82962 GLUCOSE BLOOD TEST: CPT

## 2022-02-05 PROCEDURE — 74011250637 HC RX REV CODE- 250/637: Performed by: INTERNAL MEDICINE

## 2022-02-05 PROCEDURE — 65660000000 HC RM CCU STEPDOWN

## 2022-02-05 PROCEDURE — 85018 HEMOGLOBIN: CPT

## 2022-02-05 PROCEDURE — 97167 OT EVAL HIGH COMPLEX 60 MIN: CPT

## 2022-02-05 PROCEDURE — 97112 NEUROMUSCULAR REEDUCATION: CPT

## 2022-02-05 PROCEDURE — 74011000250 HC RX REV CODE- 250: Performed by: INTERNAL MEDICINE

## 2022-02-05 PROCEDURE — 97110 THERAPEUTIC EXERCISES: CPT

## 2022-02-05 PROCEDURE — 97162 PT EVAL MOD COMPLEX 30 MIN: CPT

## 2022-02-05 PROCEDURE — 36415 COLL VENOUS BLD VENIPUNCTURE: CPT

## 2022-02-05 PROCEDURE — 74011000250 HC RX REV CODE- 250: Performed by: ORTHOPAEDIC SURGERY

## 2022-02-05 PROCEDURE — 74011250637 HC RX REV CODE- 250/637: Performed by: ORTHOPAEDIC SURGERY

## 2022-02-05 PROCEDURE — 2709999900 HC NON-CHARGEABLE SUPPLY

## 2022-02-05 PROCEDURE — 74011636637 HC RX REV CODE- 636/637: Performed by: INTERNAL MEDICINE

## 2022-02-05 PROCEDURE — 74011250636 HC RX REV CODE- 250/636: Performed by: INTERNAL MEDICINE

## 2022-02-05 PROCEDURE — 74011250636 HC RX REV CODE- 250/636: Performed by: ORTHOPAEDIC SURGERY

## 2022-02-05 RX ORDER — OXYCODONE HYDROCHLORIDE 5 MG/1
2.5 TABLET ORAL
Status: DISCONTINUED | OUTPATIENT
Start: 2022-02-05 | End: 2022-02-08 | Stop reason: HOSPADM

## 2022-02-05 RX ORDER — ACETAMINOPHEN 325 MG/1
650 TABLET ORAL EVERY 8 HOURS
Status: DISCONTINUED | OUTPATIENT
Start: 2022-02-05 | End: 2022-02-08 | Stop reason: HOSPADM

## 2022-02-05 RX ORDER — LIDOCAINE 4 G/100G
1 PATCH TOPICAL EVERY 24 HOURS
Status: DISCONTINUED | OUTPATIENT
Start: 2022-02-05 | End: 2022-02-08 | Stop reason: HOSPADM

## 2022-02-05 RX ORDER — CYCLOBENZAPRINE HCL 10 MG
10 TABLET ORAL
Status: DISCONTINUED | OUTPATIENT
Start: 2022-02-05 | End: 2022-02-08 | Stop reason: HOSPADM

## 2022-02-05 RX ADMIN — SODIUM CHLORIDE, PRESERVATIVE FREE 10 ML: 5 INJECTION INTRAVENOUS at 06:27

## 2022-02-05 RX ADMIN — Medication 1 AMPULE: at 21:38

## 2022-02-05 RX ADMIN — INSULIN LISPRO 2 UNITS: 100 INJECTION, SOLUTION INTRAVENOUS; SUBCUTANEOUS at 12:39

## 2022-02-05 RX ADMIN — MIDODRINE HYDROCHLORIDE 10 MG: 5 TABLET ORAL at 09:44

## 2022-02-05 RX ADMIN — SENNOSIDES AND DOCUSATE SODIUM 2 TABLET: 8.6; 5 TABLET ORAL at 09:44

## 2022-02-05 RX ADMIN — FERROUS SULFATE TAB 325 MG (65 MG ELEMENTAL FE) 325 MG: 325 (65 FE) TAB at 09:44

## 2022-02-05 RX ADMIN — SODIUM CHLORIDE, PRESERVATIVE FREE 5 ML: 5 INJECTION INTRAVENOUS at 13:05

## 2022-02-05 RX ADMIN — SODIUM CHLORIDE 100 ML/HR: 900 INJECTION, SOLUTION INTRAVENOUS at 06:30

## 2022-02-05 RX ADMIN — ACETAMINOPHEN 650 MG: 325 TABLET ORAL at 09:44

## 2022-02-05 RX ADMIN — CEFAZOLIN SODIUM 2 G: 100 INJECTION, POWDER, LYOPHILIZED, FOR SOLUTION INTRAVENOUS at 00:38

## 2022-02-05 RX ADMIN — SODIUM CHLORIDE, PRESERVATIVE FREE 10 ML: 5 INJECTION INTRAVENOUS at 21:38

## 2022-02-05 RX ADMIN — ASPIRIN 325 MG: 325 TABLET, COATED ORAL at 10:15

## 2022-02-05 RX ADMIN — ACETAMINOPHEN 650 MG: 325 TABLET ORAL at 21:38

## 2022-02-05 RX ADMIN — PRAVASTATIN SODIUM 20 MG: 20 TABLET ORAL at 21:38

## 2022-02-05 RX ADMIN — MIDODRINE HYDROCHLORIDE 10 MG: 5 TABLET ORAL at 16:43

## 2022-02-05 RX ADMIN — MIDODRINE HYDROCHLORIDE 10 MG: 5 TABLET ORAL at 12:38

## 2022-02-05 RX ADMIN — OXYCODONE HYDROCHLORIDE 2.5 MG: 5 TABLET ORAL at 12:38

## 2022-02-05 RX ADMIN — Medication 1 AMPULE: at 09:48

## 2022-02-05 RX ADMIN — SODIUM CHLORIDE, PRESERVATIVE FREE 10 ML: 5 INJECTION INTRAVENOUS at 21:39

## 2022-02-05 RX ADMIN — OXYCODONE HYDROCHLORIDE 2.5 MG: 5 TABLET ORAL at 23:13

## 2022-02-05 NOTE — PROGRESS NOTES
ACUTE PHYSICAL THERAPY GOALS:  (Developed with and agreed upon by patient and/or caregiver.)  1. Mr. Lyric Gomez will perform supine to sit and sit to supine with min assist of 1 in 7 days. 2.  Mr. Lyric Gomez will perform sit to stand and bed to chair with max of 1 in 7 days. 3.  Mr. Lyric Gomez will perform gait with rolling walker 25 ft with mod assist of 2 in 7 days. 4.  Mr. Lyric Gomez will perform active ROM x 10 reps to left leg in 7 days. PHYSICAL THERAPY ASSESSMENT: Daily Note and PM PT Treatment Day # 1   WBAT [facility]      Evens Gomez is a 80 y.o. male   PRIMARY DIAGNOSIS: Closed displaced fracture of left femoral neck (HCC)  Sinus bradycardia [R00.1]  Closed displaced fracture of left femoral neck (HCC) [S72.002A]  Procedure(s) (LRB):  LEFT HIP HEMIARTHROPLASTY CHOICE ANES  ROOM 1105 (Left)  1 Day Post-Op  Reason for Referral:    ICD-10: Treatment Diagnosis: Generalized Muscle Weakness (M62.81)  Difficulty in walking, Not elsewhere classified (R26.2)  [place] / Product Type: Medicare /     ASSESSMENT:     REHAB RECOMMENDATIONS:   Recommendation to date pending progress:  Setting:   Short-term Rehab  Equipment:    To Be Determined     PRIOR LEVEL OF FUNCTION:  (Prior to Hospitalization) INITIAL/CURRENT LEVEL OF FUNCTION:  (Most Recently Demonstrated)   Bed Mobility:   Independent  Sit to Stand:   Independent  Transfers:   Independent  Gait/Mobility:   Independent Bed Mobility:   Total Assistance  Sit to Stand:   Total Assistance  Transfers:   Total Assistance  Gait/Mobility:   Total Assistance     ASSESSMENT:  Mr. Lyric Gomez is alert with sling on left arm due to pacemaker. He is confused at baseline but at the facility he was ambulatory. Today all mobility required total assist.  He resisted ROM to left leg but able to work through that. He has a sitter and its Ledell Sharon who is also a nursing tech.   PT/OT got him to the edge of the bed with total assist. Attempted sit to stand for transfer and unable to lift him due to resistance but Becki Sifuentes was able to lift him to the chair. It is a total lift. Once in the chair he settled nicely. Mr. Tyrel Menendez is functioning well below baseline and is therefore appropriate for skilled PT to maximize his rehab potential.  With his advanced dementia his rehab potential is poor. Sister was at bedside and provided background information. PM-Nolan (MORELIA-Anna) got Mr. Tyrel Menendez back to be before PT made it back to the room. He was with increased pain. Mr. Tyrel Menendez is really not BID appropriate. Should be every day but if he is able to get to the chair with therapy there needs to be a viable plan to get him back to bed.       SUBJECTIVE:   Mr. Tyrel Menendez states, \"dont do that\"    SOCIAL HISTORY/LIVING ENVIRONMENT:   Home Environment: Skilled nursing facility  One/Two Story Residence: One story  Living Alone: No  Support Systems: Child(kyle) (Patient's daughter)  OBJECTIVE:     PAIN: VITAL SIGNS: LINES/DRAINS:   Pre Treatment: Pain Screen  Pain Scale 1: Numeric (0 - 10)  Post Treatment: 0   IV  O2 Device: None (Room air)     GROSS EVALUATION:   Within Functional Limits Abnormal/ Functional Abnormal/ Non-Functional (see comments) Not Tested Comments:   AROM [] [x] [] []    PROM [] [] [] []    Strength [] [x] [] []    Balance [] [x] [] []    Posture [] [] [] []    Sensation [] [] [] []    Coordination [] [x] [] []    Tone [] [] [] []    Edema [] [] [] []    Activity Tolerance [] [] [] []     [] [] [] []      COGNITION/  PERCEPTION: Intact Impaired   (see comments) Comments:   Orientation [] [x]    Vision [] []    Hearing [x] []    Command Following [] [x]    Safety Awareness [] [x]     [] []      MOBILITY: I Mod I S SBA CGA Min Mod Max Total  NT x2 Comments:   Bed Mobility    Rolling [] [] [] [] [] [] [] [] [x] [] [x]    Supine to Sit [] [] [] [] [] [] [] [] [x] [] [x]    Scooting [] [] [] [] [] [] [] [] [x] [] []    Sit to Supine [] [] [] [] [] [] [] [] [] [x] []    Transfers    Sit to Stand [] [] [] [] [] [] [] [] [x] [] [] Done by Lindalou Gottron CNA   Bed to Chair [] [] [] [] [] [] [] [] [x] [] []    Stand to Sit [] [] [] [] [] [] [] [] [x] [] []    I=Independent, Mod I=Modified Independent, S=Supervision, SBA=Standby Assistance, CGA=Contact Guard Assistance,   Min=Minimal Assistance, Mod=Moderate Assistance, Max=Maximal Assistance, Total=Total Assistance, NT=Not Tested  GAIT: I Mod I S SBA CGA Min Mod Max Total  NT x2 Comments:   Level of Assistance [] [] [] [] [] [] [] [] [] [x] []    Distance     DME     Gait Quality     Weightbearing Status      I=Independent, Mod I=Modified Independent, S=Supervision, SBA=Standby Assistance, CGA=Contact Guard Assistance,   Min=Minimal Assistance, Mod=Moderate Assistance, Max=Maximal Assistance, Total=Total Assistance, NT=Not [facility]       How much difficulty does the patient currently have. .. Unable A Lot A Little None   1. Turning over in bed (including adjusting bedclothes, sheets and blankets)? [] 1   [x] 2   [] 3   [] 4   2. Sitting down on and standing up from a chair with arms ( e.g., wheelchair, bedside commode, etc.)   [] 1   [x] 2   [] 3   [] 4   3. Moving from lying on back to sitting on the side of the bed? [] 1   [x] 2   [] 3   [] 4   How much help from another person does the patient currently need. .. Total A Lot A Little None   4. Moving to and from a bed to a chair (including a wheelchair)? [] 1   [x] 2   [] 3   [] 4   5. Need to walk in hospital room? [x] 1   [] 2   [] 3   [] 4   6. Climbing 3-5 steps with a railing? [x] 1   [] 2   [] 3   [] 4   © 2007, Trustees of [address] [place] [address] [address] [address], under license to [employer]. All rights reserved     Score:  Initial: 10 Most Recent: X (Date: -- )    Interpretation of Tool:  Represents activities that are increasingly more difficult (i.e. Bed mobility, Transfers, Gait).     PLAN: FREQUENCY/DURATION: PT Plan of Care: BID for duration of hospital stay or until stated goals are met, whichever comes first.    PROBLEM LIST:   (Skilled intervention is medically necessary to address:)  1. Decreased AROM/PROM  2. Decreased Balance  3. Decreased Cognition  4. Decreased Coordination  5. Decreased Gait Ability  6. Decreased Strength  7. Decreased Transfer Abilities  8. Increased Pain   INTERVENTIONS PLANNED:   (Benefits and precautions of physical therapy have been discussed with the patient.)  1. Therapeutic Activity  2. Therapeutic Exercise/HEP  3. Neuromuscular Re-education  4. Gait Training  5. Education     TREATMENT:     EVALUATION: Moderate Complexity : (Untimed Charge)    TREATMENT:   ($$ Therapeutic Exercises: 8-22 mins    )  NO charge for this afternoon.        TREATMENT GRID:  N/A    AFTER TREATMENT POSITION/PRECAUTIONS:  Chair, Needs within reach, RN notified, Visitors at bedside and Sitter at the bedside    INTERDISCIPLINARY COLLABORATION:  RN/PCT, PT/PTA, OT/PEREZ and Sitter    TOTAL TREATMENT DURATION:  PT Patient Time In/Time Out  Time In: 0950  Time Out: [address], PT

## 2022-02-05 NOTE — ROUTINE PROCESS
[facility].

## 2022-02-05 NOTE — ROUTINE PROCESS
Bedside shift change report given to ANASTASIYA Pardo (oncoming nurse) by ANASTASIYA Barlow (offgoing nurse). Report included the following information SBAR, Kardex, ED Summary, Procedure Summary, Intake/Output, MAR and Recent Results.

## 2022-02-05 NOTE — PROGRESS NOTES
ACUTE OT GOALS:  (Developed with and agreed upon by patient and/or caregiver.)  1. Patient will complete grooming ADL with min A.   2. Patient will complete functional transfers with MOD A X2 and adaptive equipment as needed. 3. Patient will feed self entire meal with MIN A.   4. Patient will follow commands for 75% of treatment session for increased participation in ADLs. 5. Patient will tolerate sitting EOB for 10 minutes while completing functional activity with fair+ sitting balance.      Timeframe: 7 visits  WBAT LLE, sling on L arm from pacemaker  OCCUPATIONAL THERAPY ASSESSMENT: Initial Assessment and Daily Note OT Treatment Day # 1    Charmaine Jarrett Dc is a 80 y.o. male   PRIMARY DIAGNOSIS: Closed displaced fracture of left femoral neck (HCC)  Sinus bradycardia [R00.1]  Closed displaced fracture of left femoral neck (HCC) [S72.002A]  Procedure(s) (LRB):  LEFT HIP HEMIARTHROPLASTY CHOICE ANES  ROOM 1105 (Left)  1 Day Post-Op  Reason for Referral:    ICD-10: Treatment Diagnosis: Pain in left hip (M25.552)  Stiffness of Left Hip, Not elsewhere classified (M25.652)  Difficulty in walking, Not elsewhere classified (R26.2)  Other abnormalities of gait and mobility (R26.89)  History of falling (Z91.81)  [place] / Product Type: Medicare /   ASSESSMENT:     REHAB RECOMMENDATIONS:   Recommendation to date pending progress:  Setting:   Short-term Rehab  Equipment:    To Be Determined     PRIOR LEVEL OF FUNCTION:  (Prior to Hospitalization)  INITIAL/CURRENT LEVEL OF FUNCTION:  (Based on today's evaluation)   Bathing:   Unknown  Dressing:   Unknown  Feeding/Grooming:   Unknown  Toileting:   Unknown  Functional Mobility:   Unknown Bathing:   Maximal Assistance  Dressing:   Maximal Assistance  Feeding/Grooming:   Maximal Assistance  Toileting:   Total Assistance  Functional Mobility:   Total Assistance x 2     ASSESSMENT:  Mr. Phuc Dc is an 81 y/o male presents from [facility] after a fall and is POD 1 L ABIMBOLA and is now WBAT LLE. Pt has sling on LUE from pacemaker. Pt has hx of vascular dementia and is AAO x1 at baseline. Pt ADL PLOF unknown, however pt sister present to confirm pt was not using AD for ambulation prior to hospital admission. Today pt presents with decreased activity tolerance, balance, mobility and cognition impacting ADLs. Pt overall total A x2 for bed mobility and with poor sitting balance EOB. Pt demonstrated posterolateral lean to R side when sitting EOB. Pt attempted to stand with total A x2, however resistive. Pt total A x3 with assist of jone Hopper for bed>chair transfer. Pt is currently functioning below baseline and would benefit from skilled OT services to address OT goals and plan of care.  Rec STR at d/c.      SUBJECTIVE:   Mr. Kanwal Garcia states, \"Hello there\"    SOCIAL HISTORY/LIVING ENVIRONMENT: admit from [facility], unknown ADL assistance levels however was not ambulating with AD prior to [facility]: Skilled nursing facility  One/Two Story Residence: One story  Living Alone: No  Support Systems: Child(kyle) (Patient's daughter)    OBJECTIVE:     PAIN: VITAL SIGNS: LINES/DRAINS:   Pre Treatment: Pain Screen  Pain Scale 1: FLACC  Pain Intensity 1: 3  Pain Onset 1: post op  Pain Location 1: Hip  Pain Orientation 1: Left  Pain Description 1: Aching  Pain Intervention(s) 1: Repositioned  Post Treatment: no c/o pain, resting comfortably in chair   [place] Catheter and IV  O2 Device: Nasal cannula     GROSS EVALUATION:  BUE Within Functional Limits Abnormal/ Functional Abnormal/ Non-Functional (see comments) Not Tested Comments:   AROM [] [x] [] [] LUE in sling, RUE resistive   PROM [] [x] [] [] resistive   Strength [] [x] [] []    Balance [] [x] [] [] Sitting: poor  Standing: poor   Posture [] [x] [] [] Posterolateral lean to R when sitting EOB   Sensation [] [] [] [x]    Coordination [] [] [] [x]    Tone [] [] [] [x]    Edema [] [] [] [x] Activity Tolerance [] [x] [] []     [] [] [] []      COGNITION/  PERCEPTION: Intact Impaired   (see comments) Comments:   Orientation [] [x] Hx vascular dementia   Vision [] [] unknown   Hearing [] [x] [place]   Judgment/ Insight [] [x]    Attention [] [x]    Memory [] [x] Hx vascular dementia   Command Following [] [x] Little to no command follwoing   Emotional Regulation [x] []     [] []      ACTIVITIES OF DAILY LIVING: I Mod I S SBA CGA Min Mod Max Total NT Comments   BASIC ADLs:              Bathing/ Showering [] [] [] [] [] [] [] [] [] [x]    Toileting [] [] [] [] [] [] [] [] [] [x]    Dressing [] [] [] [] [] [] [] [] [x] [] socks   Feeding [] [] [] [] [] [] [] [] [] [x]    Grooming [] [] [] [] [] [] [] [] [] [x]    Personal Device Care [] [] [] [] [] [] [] [] [] [x]    Functional Mobility [] [] [] [] [] [] [] [] [x] [] x2-3   I=Independent, Mod I=Modified Independent, S=Supervision, SBA=Standby Assistance, CGA=Contact Guard Assistance,   Min=Minimal Assistance, Mod=Moderate Assistance, Max=Maximal Assistance, Total=Total Assistance, NT=Not Tested    MOBILITY: I Mod I S SBA CGA Min Mod Max Total  NT x2 Comments:   Supine to sit [] [] [] [] [] [] [] [] [x] [] [x]    Sit to supine [] [] [] [] [] [] [] [] [] [x] []    Sit to stand [] [] [] [] [] [] [] [] [x] [] [x] HHA   Bed to chair [] [] [] [] [] [] [] [] [x] [] [] x3 with assistance from sitter   I=Independent, Mod I=Modified Independent, S=Supervision, SBA=Standby Assistance, CGA=Contact Guard Assistance,   Min=Minimal Assistance, Mod=Moderate Assistance, Max=Maximal Assistance, Total=Total Assistance, NT=Not Tested    [address] [facility] [address] [address] AM-PAC 6 Clicks   Daily Activity Inpatient Short Form        How much help from another person does the patient currently need. .. Total A Lot A Little None   1. Putting on and taking off regular lower body clothing? [x] 1   [] 2   [] 3   [] 4   2. Bathing (including washing, rinsing, drying)?    [] 1   [x] 2   [] 3   [] 4 3.  Toileting, which includes using toilet, bedpan or urinal?   [x] 1   [] 2   [] 3   [] 4   4. Putting on and taking off regular upper body clothing? [] 1   [x] 2   [] 3   [] 4   5. Taking care of personal grooming such as brushing teeth? [] 1   [x] 2   [] 3   [] 4   6. Eating meals? [] 1   [x] 2   [] 3   [] 4   © 2007, Trustees of [facility] [employer], under license to [employer]. All rights reserved     Score:  Initial: 10 Most Recent: X (Date: -- )   Interpretation of Tool:  Represents activities that are increasingly more difficult (i.e. Bed mobility, Transfers, Gait). PLAN:   FREQUENCY/DURATION: OT Plan of Care: 4 times/week for duration of hospital stay or until stated goals are met, whichever comes first.    PROBLEM LIST:   (Skilled intervention is medically necessary to address:)  1. Decreased ADL/Functional Activities  2. Decreased Activity Tolerance  3. Decreased AROM/PROM  4. Decreased Balance  5. Decreased Cognition  6. Decreased Coordination  7. Decreased Gait Ability  8. Decreased Strength  9. Decreased Transfer Abilities  10. Increased Pain   INTERVENTIONS PLANNED:   (Benefits and precautions of occupational therapy have been discussed with the patient.)  1. Self Care Training  2. Therapeutic Activity  3. Therapeutic Exercise/HEP  4. Neuromuscular Re-education  5. Education     TREATMENT:     EVALUATION: High Complexity : (Untimed Charge)    TREATMENT:   ( $$ Neuromuscular Re-Education: 8-22 mins   )  Co-Treatment PT/OT necessary due to patient's decreased overall endurance/tolerance levels, as well as need for high level skilled assistance to complete functional transfers/mobility and functional tasks  Neuromuscular Re-education (12 Minutes): Neuromuscular Re-education included Balance Training, Functional mobility with facilitation, Postural training, Sitting balance training and Standing balance training to improve Balance, Functional Mobility and Postural Control.     TREATMENT GRID:  N/A    AFTER TREATMENT POSITION/PRECAUTIONS:  Chair, Needs within reach, RN notified, Visitors at bedside and Sitter at bedside    INTERDISCIPLINARY COLLABORATION:  RN/PCT, PT/PTA, OT/PEREZ and Sitter    TOTAL TREATMENT DURATION:  OT Patient Time In/Time Out  Time In: 0950  Time Out: [address], [place]

## 2022-02-05 NOTE — PROGRESS NOTES
ORTH FRACTURE PROGRESS NOTE    2022  Admit Date:   2022    Post Op day: 1 Day Post-Op    Subjective:    Kathy Hughes     PT/OT:   Gait:                    Vital Signs:    Patient Vitals for the past 8 hrs:   BP Temp Pulse Resp SpO2 Weight   22 0457 117/63 98.1 °F (36.7 °C) 61 20 95 % 153 lb 14.4 oz (69.8 kg)   22 0042 135/63 97.7 °F (36.5 °C) 65 20 96 % --     Temp (24hrs), Av.6 °F (36.4 °C), Min:97 °F (36.1 °C), Max:98.1 °F (36.7 °C)      Pain Control:   Pain Assessment  Pain Scale 1: Numeric (0 - 10)  Pain Intensity 1: 0  Pain Onset 1: post procedure  Pain Location 1: Hip  Pain Intervention(s) 1: Medication (see MAR)    Meds:    Current Facility-Administered Medications   Medication Dose Route Frequency    alcohol 62% (NOZIN) nasal  1 Ampule  1 Ampule Topical Q12H    sodium chloride (NS) flush 5-40 mL  5-40 mL IntraVENous Q8H    sodium chloride (NS) flush 5-40 mL  5-40 mL IntraVENous PRN    naloxone (NARCAN) injection 0.2-0.4 mg  0.2-0.4 mg IntraVENous Q10MIN PRN    ondansetron (ZOFRAN) injection 4 mg  4 mg IntraVENous Q4H PRN    senna-docusate (PERICOLACE) 8.6-50 mg per tablet 2 Tablet  2 Tablet Oral DAILY    aspirin delayed-release tablet 325 mg  325 mg Oral DAILY    morphine injection 3 mg  3 mg IntraVENous Q4H PRN    oxyCODONE IR (ROXICODONE) tablet 5 mg  5 mg Oral Q4H PRN    cyclobenzaprine (FLEXERIL) tablet 10 mg  10 mg Oral TID    OLANZapine (ZyPREXA) 5 mg in sterile water (preservative free) 1 mL injection  5 mg IntraMUSCular Q6H PRN    ferrous sulfate tablet 325 mg  1 Tablet Oral DAILY WITH BREAKFAST    insulin lispro (HUMALOG) injection   SubCUTAneous AC&HS    sodium chloride (NS) flush 5-40 mL  5-40 mL IntraVENous Q8H    sodium chloride (NS) flush 5-40 mL  5-40 mL IntraVENous PRN    0.9% sodium chloride infusion  100 mL/hr IntraVENous CONTINUOUS    pravastatin (PRAVACHOL) tablet 20 mg  20 mg Oral QHS    midodrine (PROAMATINE) tablet 10 mg  10 mg Oral TID WITH MEALS    sodium chloride (NS) flush 5-40 mL  5-40 mL IntraVENous Q8H    sodium chloride (NS) flush 5-40 mL  5-40 mL IntraVENous PRN    acetaminophen (TYLENOL) tablet 650 mg  650 mg Oral Q4H PRN    docusate sodium (COLACE) capsule 100 mg  100 mg Oral BID PRN       LAB:    Recent Labs     02/05/22  0429 02/04/22  1948 02/03/22  0911   HCT  --   --  34.5*   HGB 12.1*   < > 11.7*    < > = values in this interval not displayed.        24 Hour Assessment Issues:    Disoriented (baseline)    Discharge Planning: SNF    Transfuse PRBC's:      Assessment & Physician's Comment:  Dressing is clean, dry, and intact    Principal Problem:    Closed displaced fracture of left femoral neck ([address] (2/2/2022)    Active Problems:    Sinus bradycardia (2/2/2022)      Vascular dementia ([address] (2/2/2022)      Diabetes mellitus type 2, controlled ([address] (2/2/2022)        Plan:  PT as tolerated    Nana Bernheim, MD

## 2022-02-05 NOTE — PROGRESS NOTES
Problem: Pressure Injury - Risk of  Goal: *Prevention of pressure injury  Description: Document Monroe Scale and appropriate interventions in the flowsheet. Outcome: Progressing Towards Goal  Note: Pressure Injury Interventions:  Sensory Interventions: Assess changes in LOC,Check visual cues for pain,Discuss PT/OT consult with provider,Keep linens dry and wrinkle-free,Maintain/enhance activity level,Minimize linen layers,Monitor skin under medical devices,Pressure redistribution bed/mattress (bed type)    Moisture Interventions: Internal/External urinary devices,Minimize layers,Absorbent underpads    Activity Interventions: Increase time out of bed,Pressure redistribution bed/mattress(bed type),PT/OT evaluation    Mobility Interventions: Pressure redistribution bed/mattress (bed type),PT/OT evaluation    Nutrition Interventions: Document food/fluid/supplement intake,Offer support with meals,snacks and hydration    Friction and Shear Interventions: Minimize layers,Apply protective barrier, creams and emollients                Problem: Patient Education: Go to Patient Education Activity  Goal: Patient/Family Education  Outcome: Progressing Towards Goal     Problem: Falls - Risk of  Goal: *Absence of Falls  Description: Document Mono Fall Risk and appropriate interventions in the flowsheet.   Outcome: Progressing Towards Goal  Note: Fall Risk Interventions:  Mobility Interventions: Bed/chair exit alarm,Communicate number of staff needed for ambulation/transfer,Patient to call before getting OOB,PT Consult for mobility concerns,PT Consult for assist device competence,Utilize walker, cane, or other assistive device    Mentation Interventions: Adequate sleep, hydration, pain control,Bed/chair exit alarm,Door open when patient unattended,Evaluate medications/consider consulting pharmacy,Update white board,Family/sitter at bedside    Medication Interventions: Bed/chair exit alarm,Evaluate medications/consider consulting pharmacy,Teach patient to arise slowly,Patient to call before getting OOB    Elimination Interventions: Bed/chair exit alarm,Call light in reach,Patient to call for help with toileting needs    History of Falls Interventions: Bed/chair exit alarm,Door open when patient unattended,Evaluate medications/consider consulting pharmacy         Problem: Patient Education: Go to Patient Education Activity  Goal: Patient/Family Education  Outcome: Progressing Towards Goal     Problem: Patient Education: Go to Patient Education Activity  Goal: Patient/Family Education  Outcome: Progressing Towards Goal     Problem: Hip Fracture:Day of Admission Pre-op  Goal: Off Pathway (Use only if patient is Off Pathway)  Outcome: Progressing Towards Goal  Goal: Activity/Safety  Outcome: Progressing Towards Goal  Goal: Consults, if ordered  Outcome: Progressing Towards Goal  Goal: Diagnostic Test/Procedures  Outcome: Progressing Towards Goal  Goal: Nutrition/Diet  Outcome: Progressing Towards Goal  Goal: Medications  Outcome: Progressing Towards Goal  Goal: Respiratory  Outcome: Progressing Towards Goal  Goal: Treatments/Interventions/Procedures  Outcome: Progressing Towards Goal  Goal: Psychosocial  Outcome: Progressing Towards Goal  Goal: *Labs/Tests Within Defined Limits in Preparation for Surgery  Outcome: Progressing Towards Goal  Goal: *Optimal pain control at patient's stated goal  Outcome: Progressing Towards Goal  Goal: *Hemodynamically stable  Outcome: Progressing Towards Goal     Problem: Hip Fracture: Day of Surgery Post-op Care  Goal: Off Pathway (Use only if patient is Off Pathway)  Outcome: Progressing Towards Goal  Goal: Activity/Safety  Outcome: Progressing Towards Goal  Goal: Consults, if ordered  Outcome: Progressing Towards Goal  Goal: Diagnostic Test/Procedures  Outcome: Progressing Towards Goal  Goal: Nutrition/Diet  Outcome: Progressing Towards Goal  Goal: Medications  Outcome: Progressing Towards Goal  Goal: Respiratory  Outcome: Progressing Towards Goal  Goal: Treatments/Interventions/Procedures  Outcome: Progressing Towards Goal  Goal: Psychosocial  Outcome: Progressing Towards Goal  Goal: *Absence of skin breakdown  Outcome: Progressing Towards Goal  Goal: *Optimal pain control at patient's stated goal  Outcome: Progressing Towards Goal  Goal: *Hemodynamically stable  Outcome: Progressing Towards Goal     Problem: Hip Fracture: Post-Op Day 1  Goal: Off Pathway (Use only if patient is Off Pathway)  Outcome: Progressing Towards Goal  Goal: Activity/Safety  Outcome: Progressing Towards Goal  Goal: Diagnostic Test/Procedures  Outcome: Progressing Towards Goal  Goal: Nutrition/Diet  Outcome: Progressing Towards Goal  Goal: Medications  Outcome: Progressing Towards Goal  Goal: Respiratory  Outcome: Progressing Towards Goal  Goal: Treatments/Interventions/Procedures  Outcome: Progressing Towards Goal  Goal: Psychosocial  Outcome: Progressing Towards Goal  Goal: Discharge Planning  Outcome: Progressing Towards Goal  Goal: *Demonstrates progressive activity  Outcome: Progressing Towards Goal  Goal: *Optimal pain control at patient's stated goal  Outcome: Progressing Towards Goal  Goal: *Hemodynamically stable  Outcome: Progressing Towards Goal  Goal: *Discharge plan identified  Outcome: Progressing Towards Goal  Goal: *Absence of skin breakdown  Outcome: Progressing Towards Goal     Problem: Hip Fracture: Post-Op Day 2  Goal: Off Pathway (Use only if patient is Off Pathway)  Outcome: Progressing Towards Goal  Goal: Activity/Safety  Outcome: Progressing Towards Goal  Goal: Diagnostic Test/Procedures  Outcome: Progressing Towards Goal  Goal: Nutrition/Diet  Outcome: Progressing Towards Goal  Goal: Medications  Outcome: Progressing Towards Goal  Goal: Respiratory  Outcome: Progressing Towards Goal  Goal: Treatments/Interventions/Procedures  Outcome: Progressing Towards Goal  Goal: Psychosocial  Outcome: Progressing Towards Goal  Goal: Discharge Planning  Outcome: Progressing Towards Goal  Goal: *Optimal pain control at patient's stated goal  Outcome: Progressing Towards Goal  Goal: *Hemodynamically stable  Outcome: Progressing Towards Goal  Goal: *Adequate oxygenation  Outcome: Progressing Towards Goal  Goal: *Tolerates increased activity  Outcome: Progressing Towards Goal  Goal: *Tolerates nutrition therapy  Outcome: Progressing Towards Goal  Goal: *Absence of skin breakdown  Outcome: Progressing Towards Goal     Problem: Hip Fracture: Post-Op Day 3  Goal: Off Pathway (Use only if patient is Off Pathway)  Outcome: Progressing Towards Goal  Goal: Activity/Safety  Outcome: Progressing Towards Goal  Goal: Diagnostic Test/Procedures  Outcome: Progressing Towards Goal  Goal: Nutrition/Diet  Outcome: Progressing Towards Goal  Goal: Medications  Outcome: Progressing Towards Goal  Goal: Respiratory  Outcome: Progressing Towards Goal  Goal: Treatments/Interventions/Procedures  Outcome: Progressing Towards Goal  Goal: Psychosocial  Outcome: Progressing Towards Goal  Goal: Discharge Planning  Outcome: Progressing Towards Goal  Goal: *Met physical therapy criteria for discharge to next level of care  Outcome: Progressing Towards Goal  Goal: *Optimal pain control at patient's stated goal  Outcome: Progressing Towards Goal  Goal: *Hemodynamically stable  Outcome: Progressing Towards Goal  Goal: *Tolerating diet  Outcome: Progressing Towards Goal  Goal: *Active bowel function  Outcome: Progressing Towards Goal  Goal: *Adequate urinary output  Outcome: Progressing Towards Goal  Goal: *Absence of skin breakdown  Outcome: Progressing Towards Goal  Goal: *Patient verbalizes understanding of discharge instructions  Outcome: Progressing Towards Goal     Problem: Hip Fracture: Post-Op Day 4  Goal: Off Pathway (Use only if patient is Off Pathway)  Outcome: Progressing Towards Goal  Goal: Activity/Safety  Outcome: Progressing Towards Goal  Goal: Diagnostic Test/Procedures  Outcome: Progressing Towards Goal  Goal: Nutrition/Diet  Outcome: Progressing Towards Goal  Goal: Medications  Outcome: Progressing Towards Goal  Goal: Respiratory  Outcome: Progressing Towards Goal  Goal: Treatments/Interventions/Procedures  Outcome: Progressing Towards Goal  Goal: Psychosocial  Outcome: Progressing Towards Goal  Goal: Discharge Planning  Outcome: Progressing Towards Goal  Goal: *Met physical therapy criteria for discharge to next level of care  Outcome: Progressing Towards Goal  Goal: *Optimal pain control at patient's stated goal  Outcome: Progressing Towards Goal  Goal: *Hemodynamically stable  Outcome: Progressing Towards Goal  Goal: *Tolerating diet  Outcome: Progressing Towards Goal  Goal: *Active bowel function  Outcome: Progressing Towards Goal  Goal: *Adequate urinary output  Outcome: Progressing Towards Goal  Goal: *Absence of skin breakdown  Outcome: Progressing Towards Goal  Goal: *Patient verbalizes understanding of discharge instructions  Outcome: Progressing Towards Goal

## 2022-02-05 NOTE — PROGRESS NOTES
Hospitalist Progress note   Admit Date:  2022  8:07 PM   Name:  Jarocho Alston   Age:  80 y.o. Sex:  male  :  1935   MRN:  [id number]   Room:  [facility]/    Presenting Complaint: Hip Pain    Reason(s) for Admission: Sinus bradycardia [R00.1]  Closed displaced fracture of left femoral neck (HCC) [S72.002A]     Interval Hx/Subjective:   Jarocho Alston is a 80 y.o. male with a h/o vascular dementia and DM admitted on  with left femoral neck fracture following a fall. Patient was found to be in Mobitz type II heart block for which cardiology was notified and patient is being scheduled for pacemaker this afternoon. X-rays show a L femoral neck fracture. He was incidentally noted to have a sinus bradycardia in the 30s-40s so Cardiology was notified. At the bedside it appears c/w Mobitz 2.     :  Patient is status post pacemaker placement on 2/3. Heart rate is now within normal range. Patient is n.p.o. since midnight for left hip surgery this morning. No reported fever, vomiting, chest pain or diarrhea overnight. : Patient laying in bed. Will respond but cannot answer orientation question which is chronic per patient's history given his dementia. Unable to obtain any history or review of system from the patient. Assessment & Plan:   # L femoral neck fracture  :  Plan for ORIF on   N.p.o. since midnight  Pain control with as needed morphine and oxycodone. Switching Flexeril from as needed to 3 times daily. DVT prophylaxis with aspirin 325 mg p.o. daily x 35 doses  PT/OT eval after surgery    : DVT prophylaxis per surgery. Lidocaine patch. Started on Tylenol standing. Decrease the dose of oxycodone and avoid IV opioids given patient dementia. Change Flexeril to as needed. PT OT recommended rehab. # Sinus bradycardia  : Status post pacemaker placement on 2/3. Heart rate is now within normal range. EKG with Mobitz type II on admission.   Echo with normal EF 60 to 65% with normal diastolic dysfunction. February 5: Will monitor. TSH within normal limit. # DM2  Glucose is optimally controlled, continue NovoLog sliding scale. February 5: Blood sugar at goal.  Continue current management. # Vascular dementia  Stable. Will monitor. Dispo/Discharge Planning: Pending. PT/OT/PPD. Patient will likely need to go to rehab,  assisting with discharge planning. Diet: ADULT DIET Regular; Low Sodium (2 gm)  VTE ppx: SCDs and aspirin  Code status: DNR     Updated patient's daughter. CODE STATUS of DNR and DNI verified. Patient is from assisted living facility so needs to go to rehab. Daughter verbalized understanding that patient condition meditated in spite of treatment. I have discussed the possibility of delirium and behavioral issues in the hospitalization and use of medication with her. She verbalized understanding.     Hospital Problems as of 2/5/2022 Date Reviewed: 2/2/2022          Codes Class Noted - Resolved POA    Sinus bradycardia ICD-10-CM: R00.1  ICD-9-CM: 427.89  2/2/2022 - Present Yes        * (Principal) Closed displaced fracture of left femoral neck (HCC) ICD-10-CM: S72.002A  ICD-9-CM: 820.8  2/2/2022 - Present Yes        Vascular dementia ([facility] ICD-10-CM: F01.50  ICD-9-CM: 290.40  2/2/2022 - Present Yes        Diabetes mellitus type 2, controlled ([facility] ICD-10-CM: E11.9  ICD-9-CM: 250.00  2/2/2022 - Present Unknown                Objective:     Patient Vitals for the past 24 hrs:   Temp Pulse Resp BP SpO2   02/05/22 1211 98 °F (36.7 °C) 73 18 (!) 113/53 98 %   02/05/22 0944 -- 88 -- -- --   02/05/22 0819 99.3 °F (37.4 °C) 80 19 126/65 96 %   02/05/22 0457 98.1 °F (36.7 °C) 61 20 117/63 95 %   02/05/22 0042 97.7 °F (36.5 °C) 65 20 135/63 96 %   02/04/22 2028 97 °F (36.1 °C) 62 20 (!) 153/60 98 %   02/04/22 1537 97.7 °F (36.5 °C) 60 18 137/83 99 %     Oxygen Therapy  O2 Sat (%): 98 % (02/05/22 1211)  Pulse via Oximetry: 65 beats per minute (02/04/22 1033)  O2 Device: None (Room air) (02/05/22 1211)  Skin Assessment: Clean, dry, & intact (02/05/22 0414)  Skin Protection for O2 Device: No (02/05/22 0414)  O2 Flow Rate (L/min): 1 l/min (02/05/22 0414)    Estimated body mass index is 24.1 kg/m² as calculated from the following:    Height as of this encounter: 5' 7\" (1.702 m). Weight as of this encounter: 69.8 kg (153 lb 14.4 oz). Intake/Output Summary (Last 24 hours) at 2/5/2022 1342  Last data filed at 2/5/2022 1214  Gross per 24 hour   Intake 10 ml   Output 2250 ml   Net -2240 ml         Physical Exam:  Blood pressure (!) 113/53, pulse 73, temperature 98 °F (36.7 °C), resp. rate 18, height 5' 7\" (1.702 m), weight 69.8 kg (153 lb 14.4 oz), SpO2 98 %. General:    Elderly, alert awake, on 1 to 2 L via NC, NAD  HEENT:           Head NCAT, PERRLA positive, MMM  Neck:   supple  CV:     regular rhythm. No m/r/g. No jugular venous distension. Pacemaker present  Lungs:   CTA B  Abdomen: Bowel sounds present. Soft, nontender, nondistended. Extremities: Left lower extremity with immobilizer  Skin:     No rashes and normal coloration. Warm and dry. Neuro:  Not following commands. Speaking.   Moving all 4 extremities  Psych:  Cognitive impairment    I have reviewed ordered lab tests and independently visualized imaging below:    Last 24hr Labs:  Recent Results (from the past 24 hour(s))   GLUCOSE, POC    Collection Time: 02/04/22  3:40 PM   Result Value Ref Range    Glucose (POC) 179 (H) 65 - 100 mg/dL    Performed by RottachJecynPCT    HEMOGLOBIN    Collection Time: 02/04/22  7:48 PM   Result Value Ref Range    HGB 13.1 (L) 13.6 - 17.2 g/dL   GLUCOSE, POC    Collection Time: 02/04/22  8:47 PM   Result Value Ref Range    Glucose (POC) 157 (H) 65 - 100 mg/dL    Performed by [address] PPD TEST IN 48 HRS    Collection Time: 02/05/22  3:00 AM   Result Value Ref Range    PPD Negative Negative    mm Induration 0 0 - 5 mm   HEMOGLOBIN    Collection Time: 02/05/22  4:29 AM   Result Value Ref Range    HGB 12.1 (L) 13.6 - 17.2 g/dL   GLUCOSE, POC    Collection Time: 02/05/22  7:05 AM   Result Value Ref Range    Glucose (POC) 118 (H) 65 - 100 mg/dL    Performed by ColeLindsayPCT    GLUCOSE, POC    Collection Time: 02/05/22 11:01 AM   Result Value Ref Range    Glucose (POC) 173 (H) 65 - 100 mg/dL    Performed by ColeLindsayPCT        All Micro Results     Procedure Component Value Units Date/Time    COVID-19 RAPID TEST [[id number]] Collected: 02/04/22 0602    Order Status: Completed Specimen: Nasopharyngeal Updated: 02/04/22 0700     Specimen source NASAL        COVID-19 rapid test Not detected        Comment:      The specimen is NEGATIVE for SARS-CoV-2, the novel coronavirus associated with COVID-19. A negative result does not rule out COVID-19. This test has been authorized by the FDA under an Emergency Use Authorization (EUA) for use by authorized laboratories. Fact sheet for Healthcare Providers: ConventionUpdate.co.nz  Fact sheet for Patients: ConventionUpdate.co.nz       Methodology: Isothermal Nucleic Acid Amplification         MSSA/MRSA SC BY PCR, NASAL SWAB [[id number]]     Order Status: Sent Specimen: Nasal swab     MSSA/MRSA SC BY PCR, NASAL SWAB [[id number]]     Order Status: Sent Specimen: Nasal swab           Other Studies:  No results found. Signed:  Asia Bae MD    Part of this note may have been written by using a voice dictation software. The note has been proof read but may still contain some grammatical/other typographical errors.

## 2022-02-05 NOTE — PROGRESS NOTES
ACUTE PHYSICAL THERAPY GOALS:  (Developed with and agreed upon by patient and/or caregiver.)  1. Mr. Layla Maravilla will perform supine to sit and sit to supine with min assist of 1 in 7 days. 2.  Mr. Layla Maravilla will perform sit to stand and bed to chair with max of 1 in 7 days. 3.  Mr. Layla Maravilla will perform gait with rolling walker 25 ft with mod assist of 2 in 7 days. 4.  Mr. Layla Maravilla will perform active ROM x 10 reps to left leg in 7 days. PHYSICAL THERAPY ASSESSMENT: Initial Assessment, Daily Note and AM PT Treatment Day # 1          WBAT LEXI Maravilla is a 80 y.o. male   PRIMARY DIAGNOSIS: Closed displaced fracture of left femoral neck (HCC)  Sinus bradycardia [R00.1]  Closed displaced fracture of left femoral neck (HCC) [S72.002A]  Procedure(s) (LRB):  LEFT HIP HEMIARTHROPLASTY CHOICE ANES  ROOM 1105 (Left)  1 Day Post-Op  Reason for Referral:    ICD-10: Treatment Diagnosis: Generalized Muscle Weakness (M62.81)  Difficulty in walking, Not elsewhere classified (R26.2)  [place]PAT[place] / [place] / Product Type: Medicare /     ASSESSMENT:     REHAB RECOMMENDATIONS:   Recommendation to date pending progress:  Setting:   Short-term Rehab  Equipment:    To Be Determined     PRIOR LEVEL OF FUNCTION:  (Prior to Hospitalization) INITIAL/CURRENT LEVEL OF FUNCTION:  (Most Recently Demonstrated)   Bed Mobility:   Independent  Sit to Stand:   Independent  Transfers:   Independent  Gait/Mobility:   Independent Bed Mobility:   Total Assistance  Sit to Stand:   Total Assistance  Transfers:   Total Assistance  Gait/Mobility:   Total Assistance     ASSESSMENT:  Mr. Layla Maravilla is alert with sling on left arm due to pacemaker. He is confused at baseline but at the facility he was ambulatory. Today all mobility required total assist.  He resisted ROM to left leg but able to work through that. He has a sitter and its Dwyane Kurtis who is also a nursing tech.   PT/OT got him to the edge of the bed with total assist.  Attempted sit to stand for transfer and unable to lift him due to resistance but Theron Rubalcava was able to lift him to the chair. It is a total lift. Once in the chair he settled nicely. Mr. Yandel Booth is functioning well below baseline and is therefore appropriate for skilled PT to maximize his rehab potential.  With his advanced dementia his rehab potential is poor. Sister was at bedside and provided background information.      SUBJECTIVE:   Mr. Yandel Booth states, \"dont do that\"    SOCIAL HISTORY/LIVING ENVIRONMENT:   Home Environment: Skilled nursing facility  One/Two Story Residence: One story  Living Alone: No  Support Systems: Child(kyle) (Patient's daughter)  OBJECTIVE:     PAIN: VITAL SIGNS: LINES/DRAINS:   Pre Treatment: Pain Screen  Pain Scale 1: Numeric (0 - 10)  Post Treatment: 0   IV  O2 Device: Nasal cannula     GROSS EVALUATION:   Within Functional Limits Abnormal/ Functional Abnormal/ Non-Functional (see comments) Not Tested Comments:   AROM [] [x] [] []    PROM [] [] [] []    Strength [] [x] [] []    Balance [] [x] [] []    Posture [] [] [] []    Sensation [] [] [] []    Coordination [] [x] [] []    Tone [] [] [] []    Edema [] [] [] []    Activity Tolerance [] [] [] []     [] [] [] []      COGNITION/  PERCEPTION: Intact Impaired   (see comments) Comments:   Orientation [] [x]    Vision [] []    Hearing [x] []    Command Following [] [x]    Safety Awareness [] [x]     [] []      MOBILITY: I Mod I S SBA CGA Min Mod Max Total  NT x2 Comments:   Bed Mobility    Rolling [] [] [] [] [] [] [] [] [x] [] [x]    Supine to Sit [] [] [] [] [] [] [] [] [x] [] [x]    Scooting [] [] [] [] [] [] [] [] [x] [] []    Sit to Supine [] [] [] [] [] [] [] [] [] [x] []    Transfers    Sit to Stand [] [] [] [] [] [] [] [] [x] [] [] Done by Kwaku Celis CNALEXANDER   Bed to Chair [] [] [] [] [] [] [] [] [x] [] []    Stand to Sit [] [] [] [] [] [] [] [] [x] [] []    I=Independent, Mod I=Modified Independent, S=Supervision, SBA=Standby Assistance, CGA=Contact Milad Schwab,   Min=Minimal Assistance, Mod=Moderate Assistance, Max=Maximal Assistance, Total=Total Assistance, NT=Not Tested  GAIT: I Mod I S SBA CGA Min Mod Max Total  NT x2 Comments:   Level of Assistance [] [] [] [] [] [] [] [] [] [x] []    Distance     DME     Gait Quality     Weightbearing Status      I=Independent, Mod I=Modified Independent, S=Supervision, SBA=Standby Assistance, CGA=Contact Guard Assistance,   Min=Minimal Assistance, Mod=Moderate Assistance, Max=Maximal Assistance, Total=Total Assistance, NT=Not [facility]       How much difficulty does the patient currently have. .. Unable A Lot A Little None   1. Turning over in bed (including adjusting bedclothes, sheets and blankets)? [] 1   [x] 2   [] 3   [] 4   2. Sitting down on and standing up from a chair with arms ( e.g., wheelchair, bedside commode, etc.)   [] 1   [x] 2   [] 3   [] 4   3. Moving from lying on back to sitting on the side of the bed? [] 1   [x] 2   [] 3   [] 4   How much help from another person does the patient currently need. .. Total A Lot A Little None   4. Moving to and from a bed to a chair (including a wheelchair)? [] 1   [x] 2   [] 3   [] 4   5. Need to walk in hospital room? [x] 1   [] 2   [] 3   [] 4   6. Climbing 3-5 steps with a railing? [x] 1   [] 2   [] 3   [] 4   © 2007, Trustees of [facility] [employer], under license to [employer]. All rights reserved     Score:  Initial: 10 Most Recent: X (Date: -- )    Interpretation of Tool:  Represents activities that are increasingly more difficult (i.e. Bed mobility, Transfers, Gait). PLAN:   FREQUENCY/DURATION: PT Plan of Care: BID for duration of hospital stay or until stated goals are met, whichever comes first.    PROBLEM LIST:   (Skilled intervention is medically necessary to address:)  1. Decreased AROM/PROM  2. Decreased Balance  3.  Decreased Cognition  4. Decreased Coordination  5. Decreased Gait Ability  6. Decreased Strength  7. Decreased Transfer Abilities  8. Increased Pain   INTERVENTIONS PLANNED:   (Benefits and precautions of physical therapy have been discussed with the patient.)  1. Therapeutic Activity  2. Therapeutic Exercise/HEP  3. Neuromuscular Re-education  4. Gait Training  5. Education     TREATMENT:     EVALUATION: Moderate Complexity : (Untimed Charge)    TREATMENT:   ($$ Therapeutic Exercises: 8-22 mins    )  Therapeutic Exercise (15 Minutes): Therapeutic exercises noted below to improve functional AROM, strength and mobility.    PROM LLE    TREATMENT GRID:  N/A    AFTER TREATMENT POSITION/PRECAUTIONS:  Chair, Needs within reach, RN notified, Visitors at bedside and Sitter at the bedside    INTERDISCIPLINARY COLLABORATION:  RN/PCT, PT/PTA, OT/PEREZ and Sitter    TOTAL TREATMENT DURATION:  PT Patient Time In/Time Out  Time In: 0950  Time Out: Irving Jeffery PT

## 2022-02-06 LAB
GLUCOSE BLD STRIP.AUTO-MCNC: 142 MG/DL (ref 65–100)
GLUCOSE BLD STRIP.AUTO-MCNC: 183 MG/DL (ref 65–100)
GLUCOSE BLD STRIP.AUTO-MCNC: 197 MG/DL (ref 65–100)
HGB BLD-MCNC: 11.5 G/DL (ref 13.6–17.2)
MM INDURATION POC: 0 MM (ref 0–5)
PPD POC: NEGATIVE NEGATIVE
SERVICE CMNT-IMP: ABNORMAL

## 2022-02-06 PROCEDURE — 97530 THERAPEUTIC ACTIVITIES: CPT

## 2022-02-06 PROCEDURE — 74011636637 HC RX REV CODE- 636/637: Performed by: INTERNAL MEDICINE

## 2022-02-06 PROCEDURE — 74011250636 HC RX REV CODE- 250/636: Performed by: INTERNAL MEDICINE

## 2022-02-06 PROCEDURE — 36591 DRAW BLOOD OFF VENOUS DEVICE: CPT

## 2022-02-06 PROCEDURE — 82962 GLUCOSE BLOOD TEST: CPT

## 2022-02-06 PROCEDURE — 74011000250 HC RX REV CODE- 250: Performed by: INTERNAL MEDICINE

## 2022-02-06 PROCEDURE — 74011250637 HC RX REV CODE- 250/637: Performed by: INTERNAL MEDICINE

## 2022-02-06 PROCEDURE — BT40ZZZ ULTRASONOGRAPHY OF BLADDER: ICD-10-PCS | Performed by: INTERNAL MEDICINE

## 2022-02-06 PROCEDURE — 2709999900 HC NON-CHARGEABLE SUPPLY

## 2022-02-06 PROCEDURE — 51798 US URINE CAPACITY MEASURE: CPT

## 2022-02-06 PROCEDURE — 65660000000 HC RM CCU STEPDOWN

## 2022-02-06 PROCEDURE — 97535 SELF CARE MNGMENT TRAINING: CPT

## 2022-02-06 PROCEDURE — 74011250637 HC RX REV CODE- 250/637: Performed by: ORTHOPAEDIC SURGERY

## 2022-02-06 PROCEDURE — 77030040830 HC CATH URETH FOL MDII -A

## 2022-02-06 PROCEDURE — 74011000250 HC RX REV CODE- 250: Performed by: ORTHOPAEDIC SURGERY

## 2022-02-06 PROCEDURE — 85018 HEMOGLOBIN: CPT

## 2022-02-06 PROCEDURE — 97116 GAIT TRAINING THERAPY: CPT

## 2022-02-06 RX ORDER — POLYETHYLENE GLYCOL 3350 17 G/17G
17 POWDER, FOR SOLUTION ORAL DAILY
Status: DISCONTINUED | OUTPATIENT
Start: 2022-02-06 | End: 2022-02-08 | Stop reason: HOSPADM

## 2022-02-06 RX ADMIN — Medication 1 AMPULE: at 21:39

## 2022-02-06 RX ADMIN — ACETAMINOPHEN 650 MG: 325 TABLET ORAL at 05:31

## 2022-02-06 RX ADMIN — MIDODRINE HYDROCHLORIDE 10 MG: 5 TABLET ORAL at 08:57

## 2022-02-06 RX ADMIN — ACETAMINOPHEN 650 MG: 325 TABLET ORAL at 12:10

## 2022-02-06 RX ADMIN — SODIUM CHLORIDE, PRESERVATIVE FREE 10 ML: 5 INJECTION INTRAVENOUS at 21:42

## 2022-02-06 RX ADMIN — INSULIN LISPRO 2 UNITS: 100 INJECTION, SOLUTION INTRAVENOUS; SUBCUTANEOUS at 16:46

## 2022-02-06 RX ADMIN — SODIUM CHLORIDE, PRESERVATIVE FREE 5 ML: 5 INJECTION INTRAVENOUS at 12:41

## 2022-02-06 RX ADMIN — ACETAMINOPHEN 650 MG: 325 TABLET ORAL at 21:39

## 2022-02-06 RX ADMIN — MIDODRINE HYDROCHLORIDE 10 MG: 5 TABLET ORAL at 16:45

## 2022-02-06 RX ADMIN — SODIUM CHLORIDE, PRESERVATIVE FREE 5 ML: 5 INJECTION INTRAVENOUS at 05:32

## 2022-02-06 RX ADMIN — SODIUM CHLORIDE, PRESERVATIVE FREE 5 ML: 5 INJECTION INTRAVENOUS at 05:31

## 2022-02-06 RX ADMIN — SENNOSIDES AND DOCUSATE SODIUM 2 TABLET: 8.6; 5 TABLET ORAL at 08:57

## 2022-02-06 RX ADMIN — SODIUM CHLORIDE, PRESERVATIVE FREE 5 ML: 5 INJECTION INTRAVENOUS at 05:30

## 2022-02-06 RX ADMIN — POLYETHYLENE GLYCOL 3350 17 G: 17 POWDER, FOR SOLUTION ORAL at 14:55

## 2022-02-06 RX ADMIN — MIDODRINE HYDROCHLORIDE 10 MG: 5 TABLET ORAL at 12:09

## 2022-02-06 RX ADMIN — ASPIRIN 325 MG: 325 TABLET, COATED ORAL at 08:57

## 2022-02-06 RX ADMIN — Medication 1 AMPULE: at 08:57

## 2022-02-06 RX ADMIN — FERROUS SULFATE TAB 325 MG (65 MG ELEMENTAL FE) 325 MG: 325 (65 FE) TAB at 08:57

## 2022-02-06 RX ADMIN — INSULIN LISPRO 2 UNITS: 100 INJECTION, SOLUTION INTRAVENOUS; SUBCUTANEOUS at 09:00

## 2022-02-06 RX ADMIN — PRAVASTATIN SODIUM 20 MG: 20 TABLET ORAL at 21:39

## 2022-02-06 RX ADMIN — SODIUM CHLORIDE 100 ML/HR: 900 INJECTION, SOLUTION INTRAVENOUS at 05:32

## 2022-02-06 RX ADMIN — OXYCODONE HYDROCHLORIDE 2.5 MG: 5 TABLET ORAL at 18:10

## 2022-02-06 NOTE — PROGRESS NOTES
ACUTE OT GOALS:  (Developed with and agreed upon by patient and/or caregiver.)  1. Patient will complete grooming ADL with min A.   2. Patient will complete functional transfers with MOD A X2 and adaptive equipment as needed. 3. Patient will feed self entire meal with MIN A.   4. Patient will follow commands for 75% of treatment session for increased participation in ADLs. 5. Patient will tolerate sitting EOB for 10 minutes while completing functional activity with fair+ sitting balance. OCCUPATIONAL THERAPY: Daily Note OT Treatment Day # 2    Ed Meals Carmen Lugo is a 80 y.o. male   PRIMARY DIAGNOSIS: Closed displaced fracture of left femoral neck (HCC)  Sinus bradycardia [R00.1]  Closed displaced fracture of left femoral neck (HCC) [S72.002A]  Procedure(s) (LRB):  LEFT HIP HEMIARTHROPLASTY CHOICE ANES  ROOM 1105 (Left)  2 Days Post-Op  [place] / [place] / Product Type: Medicare /   ASSESSMENT:     REHAB RECOMMENDATIONS: CURRENT LEVEL OF FUNCTION:  (Most Recently Demonstrated)   Recommendation to date pending progress:  Setting:   Short-term Rehab  Equipment:    Rolling Walker Bathing:   Supervision  Dressing:   Not tested  Feeding/Grooming:   Set Up  Toileting:   Not tested  Functional Mobility:   Maximal Assistance X 2      ASSESSMENT:  Mr. Carmen Lugo was supine in bed upon arrival. Pt completed completed bed mobility and functional transfer with max A X 2. Pt completed grooming with set up while sitting EOB. Continue POC. SUBJECTIVE:   Mr. Carmen Lugo states, \"I cant do it. \"    SOCIAL HISTORY/LIVING ENVIRONMENT:   Home Environment: Skilled nursing facility  One/Two Story Residence: One story  Living Alone: No  Support Systems: Child(kyle) (Patient's daughter)    OBJECTIVE:     PAIN: VITAL SIGNS: LINES/DRAINS:   Pre Treatment:  5  Post Treatment: 5   Fallon Catheter and IV  O2 Device: None (Room air)     ACTIVITIES OF DAILY LIVING: I Mod I S SBA CGA Min Mod Max Total NT Comments BASIC ADLs:              Bathing/ Showering [] [] [] [] [] [] [] [] [] []    Toileting [] [] [] [] [] [] [] [] [] []    Dressing [] [] [] [] [] [] [] [] [] []    Feeding [] [] [] [] [] [] [] [] [] []    Grooming [] [] [x] [] [] [] [] [] [] []    Personal Device Care [] [] [] [] [] [] [] [] [] []    Functional Mobility [] [] [] [] [] [] [] [] [] []    I=Independent, Mod I=Modified Independent, S=Supervision, SBA=Standby Assistance, CGA=Contact Guard Assistance,   Min=Minimal Assistance, Mod=Moderate Assistance, Max=Maximal Assistance, Total=Total Assistance, NT=Not Tested    MOBILITY: I Mod I S SBA CGA Min Mod Max Total  NT x2 Comments:   Supine to sit [] [] [] [] [] [] [] [x] [] [] [x]    Sit to supine [] [] [] [] [] [] [] [] [] [] []    Sit to stand [] [] [] [] [] [] [] [x] [] [] [x]    Bed to chair [] [] [] [] [] [] [] [x] [] [] [x]    I=Independent, Mod I=Modified Independent, S=Supervision, SBA=Standby Assistance, CGA=Contact Guard Assistance,   Min=Minimal Assistance, Mod=Moderate Assistance, Max=Maximal Assistance, Total=Total Assistance, NT=Not Tested    BALANCE: Good Fair+ Fair Fair- Poor NT Comments   Sitting Static [x] [] [] [] [] []    Sitting Dynamic [x] [] [] [] [] []              Standing Static [] [] [] [] [x] []    Standing Dynamic [] [] [] [] [x] []      PLAN:   FREQUENCY/DURATION: OT Plan of Care: 4 times/week for duration of hospital stay or until stated goals are met, whichever comes first.    TREATMENT:   TREATMENT:   ($$ Self Care/Home Management: 23-37 mins    )  Self Care (24 Minutes): Self care including Grooming to increase independence and decrease level of assistance required.     TREATMENT GRID:  N/A    AFTER TREATMENT POSITION/PRECAUTIONS:  Chair, Needs within reach, RN notified and sitter present     INTERDISCIPLINARY COLLABORATION:  RN/PCT, PT/PTA and OT/PEREZ    TOTAL TREATMENT DURATION:  OT Patient Time In/Time Out  Time In: 0920  [facility]

## 2022-02-06 NOTE — PROGRESS NOTES
ACUTE PHYSICAL THERAPY GOALS:  (Developed with and agreed upon by patient and/or caregiver.)  1. Mr. Micaela Johnson will perform supine to sit and sit to supine with min assist of 1 in 7 days. 2.  Mr. Micaela Johnson will perform sit to stand and bed to chair with max of 1 in 7 days. 3.  Mr. Micaela Johnson will perform gait with rolling walker 25 ft with mod assist of 2 in 7 days. 4.  Mr. Micaela Johnson will perform active ROM x 10 reps to left leg in 7 days. PHYSICAL THERAPY: Daily Note and AM Treatment Day # 2     WBAT L LE; Hip Precautions  L UE in sling s/p pacemaker placement    Mara Tamez is a 80 y.o. male   PRIMARY DIAGNOSIS: Closed displaced fracture of left femoral neck (HCC)  Sinus bradycardia [R00.1]  Closed displaced fracture of left femoral neck (HCC) [S72.002A]  Procedure(s) (LRB):  LEFT HIP HEMIARTHROPLASTY CHOICE ANES  ROOM 1105 (Left)  2 Days Post-Op    ASSESSMENT:     REHAB RECOMMENDATIONS: CURRENT LEVEL OF FUNCTION:  (Most Recently Demonstrated)   Recommendation to date pending progress:  Setting:   Short-term Rehab  Equipment:    To Be Determined Bed Mobility:   Moderate Assistance x 2  Sit to Stand:   Maximal Assistance x 2  Transfers:   Maximal Assistance x 2  Gait/Mobility:   Maximal Assistance x 2     ASSESSMENT:  Mr. Micaela Johnson is a pleasant 80 year old male admitted with left femoral neck fracture. Patient is 2 days s/p left hip hemiarthroplasty and is WBAT L LE with posterior hip precautions. Patient is seen this AM for PT treatment session per BID plan of care. Required moderate-maximal assistance and multimodal cues for transfer training, pre-gait mobility, and B LE AAROM. Improved mobility and command following appreciated today compared to pervious session. Continues to present with decreased functional strength, activity tolerance, and balance/gait status; goals remain ongoing and appropriate. Recommend STR at discharge. Continue with BID plan of care during acute stay.          SUBJECTIVE:    Cassandra Chris states, \"I am cold\"    SOCIAL HISTORY/ LIVING ENVIRONMENT: Dementia, ambulatory  Home Environment: Skilled nursing facility  One/Two Story Residence: One story  Living Alone: No  Support Systems: Child(kyle) (Patient's daughter)  OBJECTIVE:     PAIN: VITAL SIGNS: LINES/DRAINS:   Pre Treatment: Pain Screen  Pain Scale 1: FLACC  Pain Intensity 1: 2  Pain Onset 1: mobility  Pain Location 1: Leg  Pain Orientation 1: Left  Pain Description 1: Sore  Pain Intervention(s) 1: Emotional support;Position;Elevation;Rest;Repositioned  Post Treatment: FLACC 0 at rest   Fallon Catheter and IV  O2 Device: None (Room air)     MOBILITY: I Mod I S SBA CGA Min Mod Max Total  NT x2 Comments:   Bed Mobility    Rolling [] [] [] [] [] [] [] [] [] [x] []    Supine to Sit [] [] [] [] [] [] [x] [] [] [] [x]    Scooting [] [] [] [] [] [] [x] [] [] [] [x]    Sit to Supine [] [] [] [] [] [] [] [] [] [x] []    Transfers    Sit to Stand [] [] [] [] [] [] [] [x] [] [] [x]    Bed to Chair [] [] [] [] [] [] [] [x] [] [] [x]    Stand to Sit [] [] [] [] [] [] [] [x] [] [] [x]    I=Independent, Mod I=Modified Independent, S=Supervision, SBA=Standby Assistance, CGA=Contact Guard Assistance,   Min=Minimal Assistance, Mod=Moderate Assistance, Max=Maximal Assistance, Total=Total Assistance, NT=Not Tested    BALANCE: Good Fair+ Fair Fair- Poor NT Comments   Sitting Static [] [x] [] [] [] []    Sitting Dynamic [] [x] [] [] [] []              Standing Static [] [] [] [] [x] []    Standing Dynamic [] [] [] [] [x] []      GAIT: I Mod I S SBA CGA Min Mod Max Total  NT x2 Comments:   Level of Assistance [] [] [] [] [] [] [] [] [] [x] [] Stand pivot transfer   Distance N/A    DME Gait Belt    Gait Quality Stand pivot transfer only    Weightbearing  Status WBAT, L LE with posterior hip precautions     I=Independent, Mod I=Modified Independent, S=Supervision, SBA=Standby Assistance, CGA=Contact Guard Assistance,   Min=Minimal Assistance, Mod=Moderate Assistance, Max=Maximal Assistance, Total=Total Assistance, NT=Not Tested    PLAN:   FREQUENCY/DURATION: PT Plan of Care: BID for duration of hospital stay or until stated goals are met, whichever comes first.  TREATMENT:     TREATMENT:   ($$ Therapeutic Activity: 23-37 mins    )  Therapeutic Activity (23 Minutes): Therapeutic activity included Supine to Sit, Scooting, Transfer Training, Sitting balance , Standing balance and AAROM L LE (LAQ, ankle dorsi/plantarflexion, hip abduction to improve functional Mobility, Strength, ROM and Activity tolerance. At this time, patient is appropriate for Co-treatment with physical therapy due to patient's decreased overall endurance/tolerance levels, as well as need for high level skilled assistance to complete functional transfers/mobility and functional tasks. Juana Howe is appropriate for a multidisciplinary co-treatment of PT and OT to address the goals of both disciplines.      TREATMENT GRID:  N/A    AFTER TREATMENT POSITION/PRECAUTIONS:  Chair, Needs within reach, RN notified and Sitter at bedside, MD at bedside, RN aware of patient status/progress    INTERDISCIPLINARY COLLABORATION:  MD/PA/NP, RN/PCT, PT/PTA, OT/PEREZ and Sitter    TOTAL TREATMENT DURATION:  PT Patient Time In/Time Out  Time In: 0920  Time Out: [address] [facility], [facility]

## 2022-02-06 NOTE — PROGRESS NOTES
Physical Therapy Note:  Attempted to see patient this PM for BID plan of care. Patient was just back in bed per PCT and sleeping soundly. See AM note. Continue with plan of care.     Jb Sifuentes, PT, DPT  2/6/22 12:50PM

## 2022-02-06 NOTE — PROGRESS NOTES
Pt moved from room 325 to room 322 since there is no sitter for the night. Pt's daughter updated via phone on room change and questions answered.

## 2022-02-06 NOTE — ROUTINE PROCESS
Bedside shift change report given to Morgan Cr RN (oncoming nurse) by Jayne Staley RN (offgoing nurse).  Report included the following information SBAR, Kardex, ED Summary, Procedure Summary, Intake/Output, MAR and Recent Results.

## 2022-02-06 NOTE — PROGRESS NOTES
Hospitalist Progress note   Admit Date:  2022  8:07 PM   Name:  Thang Becerra   Age:  80 y.o. Sex:  male  :  1935   MRN:  [id number]   Room:  [facility]/    Presenting Complaint: Hip Pain    Reason(s) for Admission: Sinus bradycardia [R00.1]  Closed displaced fracture of left femoral neck (HCC) [S72.002A]     Interval Hx/Subjective:   Thang Becerra is a 80 y.o. male with a h/o vascular dementia and DM admitted on  with left femoral neck fracture following a fall. Patient was found to be in Mobitz type II heart block for which cardiology was notified and patient is being scheduled for pacemaker this afternoon. X-rays show a L femoral neck fracture. He was incidentally noted to have a sinus bradycardia in the 30s-40s so Cardiology was notified. At the bedside it appears c/w Mobitz 2.     :  Patient is status post pacemaker placement on 2/3. Heart rate is now within normal range. Patient is n.p.o. since midnight for left hip surgery this morning. No reported fever, vomiting, chest pain or diarrhea overnight. : Patient laying in bed. Will respond but cannot answer orientation question which is chronic per patient's history given his dementia. Unable to obtain any history or review of system from the patient. : Patient alert sitting in the chair. Able to communicate but cannot answer specific question. Unable to obtain history or review of system from the patient. Assessment & Plan:   # L femoral neck fracture  :  Plan for ORIF on   N.p.o. since midnight  Pain control with as needed morphine and oxycodone. Switching Flexeril from as needed to 3 times daily. DVT prophylaxis with aspirin 325 mg p.o. daily x 35 doses  PT/OT eval after surgery    : DVT prophylaxis per surgery. Lidocaine patch. Started on Tylenol standing. Decrease the dose of oxycodone and avoid IV opioids given patient dementia. Change Flexeril to as needed.   PT OT recommended rehab.    February 6: Pain well controlled on current regimen. Vitamin D ordered with next blood draw. # Sinus bradycardia  2/4: Status post pacemaker placement on 2/3. Heart rate is now within normal range. EKG with Mobitz type II on admission. Echo with normal EF 60 to 65% with normal diastolic dysfunction. February 5: Will monitor. TSH within normal limit. # Urinary retention: Currently no room for Flomax. No bowel movement documented in last 2 days. Added MiraLAX to current regimen. Will monitor. # DM2  Glucose is optimally controlled, continue NovoLog sliding scale. February 5: Blood sugar at goal.  Continue current management. # Vascular dementia  Stable. Will monitor. Dispo/Discharge Planning: Pending. PT/OT/PPD. Patient will likely need to go to rehab,  assisting with discharge planning. Diet: ADULT DIET Regular; Low Sodium (2 gm)  VTE ppx: SCDs and aspirin  Code status: DNR     Updated patient's daughter who was present at bedside. waiting for rehab placement.   Patient is DNR/DNI    Hospital Problems as of 2/6/2022 Date Reviewed: 2/2/2022          Codes Class Noted - Resolved POA    Sinus bradycardia ICD-10-CM: R00.1  ICD-9-CM: 427.89  2/2/2022 - Present Yes        * (Principal) Closed displaced fracture of left femoral neck (HCC) ICD-10-CM: S72.002A  ICD-9-CM: 820.8  2/2/2022 - Present Yes        Vascular dementia ([facility] ICD-10-CM: F01.50  ICD-9-CM: 290.40  2/2/2022 - Present Yes        Diabetes mellitus type 2, controlled ([facility] ICD-10-CM: E11.9  ICD-9-CM: 250.00  2/2/2022 - Present Unknown                Objective:     Patient Vitals for the past 24 hrs:   Temp Pulse Resp BP SpO2   02/06/22 1156 98.4 °F (36.9 °C) 78 16 116/63 96 %   02/06/22 0833 98.4 °F (36.9 °C) 75 16 (!) 116/59 97 %   02/06/22 0500 98.4 °F (36.9 °C) 81 16 135/66 95 %   02/06/22 0040 98.1 °F (36.7 °C) 86 20 111/67 92 %   02/05/22 2034 97.3 °F (36.3 °C) 75 18 (!) 149/69 91 %   02/05/22 1541 98.2 °F (36.8 °C) 97 16 118/74 95 %     Oxygen Therapy  O2 Sat (%): 96 % (02/06/22 1156)  Pulse via Oximetry: 65 beats per minute (02/04/22 1033)  O2 Device: None (Room air) (02/06/22 1212)  Skin Assessment: Clean, dry, & intact (02/05/22 0414)  Skin Protection for O2 Device: No (02/05/22 0414)  O2 Flow Rate (L/min): 1 l/min (02/05/22 0414)    Estimated body mass index is 23.81 kg/m² as calculated from the following:    Height as of this encounter: 5' 7\" (1.702 m). Weight as of this encounter: 68.9 kg (152 lb). Intake/Output Summary (Last 24 hours) at 2/6/2022 1228  Last data filed at 2/6/2022 1212  Gross per 24 hour   Intake 1350 ml   Output 1650 ml   Net -300 ml         Physical Exam:  Blood pressure 116/63, pulse 78, temperature 98.4 °F (36.9 °C), resp. rate 16, height 5' 7\" (1.702 m), weight 68.9 kg (152 lb), SpO2 96 %. General:    Elderly, alert awake, on 1 to 2 L via NC, NAD  HEENT:           Head NCAT, PERRLA positive, MMM  Neck:   supple  CV:     regular rhythm. No m/r/g. No jugular venous distension. Pacemaker present  Lungs:   CTA B  Abdomen: Bowel sounds present. Soft, nontender, nondistended. Extremities: Left lower extremity with immobilizer  Skin:     No rashes and normal coloration. Warm and dry. Neuro:  Not following commands. Speaking.   Moving all 4 extremities  Psych:  Cognitive impairment    I have reviewed ordered lab tests and independently visualized imaging below:    Last 24hr Labs:  Recent Results (from the past 24 hour(s))   GLUCOSE, POC    Collection Time: 02/05/22  3:44 PM   Result Value Ref Range    Glucose (POC) 151 (H) 65 - 100 mg/dL    Performed by Garret    GLUCOSE, POC    Collection Time: 02/05/22  8:04 PM   Result Value Ref Range    Glucose (POC) 130 (H) 65 - 100 mg/dL    Performed by LemonAlliePCA    PLEASE READ & DOCUMENT PPD TEST IN 72 HRS    Collection Time: 02/06/22  3:00 AM   Result Value Ref Range    PPD Negative Negative    mm Induration 0 0 - 5 mm   HEMOGLOBIN    Collection Time: 02/06/22  4:02 AM   Result Value Ref Range    HGB 11.5 (L) 13.6 - 17.2 g/dL   GLUCOSE, POC    Collection Time: 02/06/22  7:31 AM   Result Value Ref Range    Glucose (POC) 183 (H) 65 - 100 mg/dL    Performed by Marcos    GLUCOSE, POC    Collection Time: 02/06/22 11:24 AM   Result Value Ref Range    Glucose (POC) 142 (H) 65 - 100 mg/dL    Performed by Marlena Gan        All Micro Results     Procedure Component Value Units Date/Time    COVID-19 RAPID TEST [[id number]] Collected: 02/04/22 0602    Order Status: Completed Specimen: Nasopharyngeal Updated: 02/04/22 0700     Specimen source NASAL        COVID-19 rapid test Not detected        Comment:      The specimen is NEGATIVE for SARS-CoV-2, the novel coronavirus associated with COVID-19. A negative result does not rule out COVID-19. This test has been authorized by the FDA under an Emergency Use Authorization (EUA) for use by authorized laboratories. Fact sheet for Healthcare Providers: ConventionUpdate.co.nz  Fact sheet for Patients: ConventionUpdate.co.nz       Methodology: Isothermal Nucleic Acid Amplification         MSSA/MRSA SC BY PCR, NASAL SWAB [[id number]] Collected: 02/03/22 0430    Order Status: Canceled Specimen: Nasal swab     MSSA/MRSA SC BY PCR, NASAL SWAB [[id number]]     Order Status: Sent Specimen: Nasal swab           Other Studies:  No results found. Signed:  Asia Bae MD    Part of this note may have been written by using a voice dictation software. The note has been proof read but may still contain some grammatical/other typographical errors.

## 2022-02-06 NOTE — PROGRESS NOTES
[facility] FRACTURE PROGRESS NOTE    2022  Admit Date:   2022    Post Op day: 2 Days Post-Op left hip Betito     Subjective:    Jada Schneider is sitting in chair.  No complaints     PT/OT:     Continue PT/OT      Vital Signs:    Patient Vitals for the past 8 hrs:   BP Temp Pulse Resp SpO2 Weight   22 0833 (!) 116/59 98.4 °F (36.9 °C) 75 16 97 % --   22 0500 135/66 98.4 °F (36.9 °C) 81 16 95 % 152 lb (68.9 kg)     Temp (24hrs), Av.1 °F (36.7 °C), Min:97.3 °F (36.3 °C), Max:98.4 °F (36.9 °C)      Pain Control:   Pain Assessment  Pain Scale 1: FLACC  Pain Intensity 1: 2  Pain Onset 1: mobility  Pain Location 1: Leg  Pain Orientation 1: Left  Pain Description 1: Sore  Pain Intervention(s) 1: Emotional support,Position,Elevation,Rest,Repositioned    Meds:    Current Facility-Administered Medications   Medication Dose Route Frequency    lidocaine 4 % patch 1 Patch  1 Patch TransDERmal Q24H    acetaminophen (TYLENOL) tablet 650 mg  650 mg Oral Q8H    oxyCODONE IR (ROXICODONE) tablet 2.5 mg  2.5 mg Oral Q4H PRN    cyclobenzaprine (FLEXERIL) tablet 10 mg  10 mg Oral TID PRN    alcohol 62% (NOZIN) nasal  1 Ampule  1 Ampule Topical Q12H    sodium chloride (NS) flush 5-40 mL  5-40 mL IntraVENous Q8H    sodium chloride (NS) flush 5-40 mL  5-40 mL IntraVENous PRN    naloxone (NARCAN) injection 0.2-0.4 mg  0.2-0.4 mg IntraVENous Q10MIN PRN    ondansetron (ZOFRAN) injection 4 mg  4 mg IntraVENous Q4H PRN    senna-docusate (PERICOLACE) 8.6-50 mg per tablet 2 Tablet  2 Tablet Oral DAILY    aspirin delayed-release tablet 325 mg  325 mg Oral DAILY    ferrous sulfate tablet 325 mg  1 Tablet Oral DAILY WITH BREAKFAST    insulin lispro (HUMALOG) injection   SubCUTAneous AC&HS    sodium chloride (NS) flush 5-40 mL  5-40 mL IntraVENous Q8H    sodium chloride (NS) flush 5-40 mL  5-40 mL IntraVENous PRN    0.9% sodium chloride infusion  100 mL/hr IntraVENous CONTINUOUS    pravastatin (PRAVACHOL) tablet 20 mg  20 mg Oral QHS    midodrine (PROAMATINE) tablet 10 mg  10 mg Oral TID WITH MEALS    sodium chloride (NS) flush 5-40 mL  5-40 mL IntraVENous Q8H    sodium chloride (NS) flush 5-40 mL  5-40 mL IntraVENous PRN    docusate sodium (COLACE) capsule 100 mg  100 mg Oral BID PRN       LAB:    Recent Labs     02/06/22  0402   HGB 11.5*       24 Hour Assessment Issues:    Oriented    Discharge Planning: SNF    Transfuse PRBC's:      Assessment & Physician's Comment:  Dressing is clean, dry, and intact  Neurovascular checks within normal limits    Principal Problem:    Closed displaced fracture of left femoral neck ([address] (2/2/2022)    Active Problems:    Sinus bradycardia (2/2/2022)      Vascular dementia ([address] (2/2/2022)      Diabetes mellitus type 2, controlled ([address] (2/2/2022)        Plan:  Continue PT/OT      Treva Campuzano NP

## 2022-02-06 NOTE — PROGRESS NOTES
Camejo removed at 2100. Pt bladder scanned after only voiding 50mL post-camejo removal. Bladder scan showed 200mL. Pt confused with how to use urinal despite education and assistance. MD Giles notified and ordered camejo to be placed again.

## 2022-02-07 LAB
25(OH)D3 SERPL-MCNC: 97.9 NG/ML (ref 30–100)
GLUCOSE BLD STRIP.AUTO-MCNC: 106 MG/DL (ref 65–100)
GLUCOSE BLD STRIP.AUTO-MCNC: 121 MG/DL (ref 65–100)
GLUCOSE BLD STRIP.AUTO-MCNC: 138 MG/DL (ref 65–100)
GLUCOSE BLD STRIP.AUTO-MCNC: 164 MG/DL (ref 65–100)
GLUCOSE BLD STRIP.AUTO-MCNC: 167 MG/DL (ref 65–100)
HGB BLD-MCNC: 10.8 G/DL (ref 13.6–17.2)
SERVICE CMNT-IMP: ABNORMAL

## 2022-02-07 PROCEDURE — 97535 SELF CARE MNGMENT TRAINING: CPT

## 2022-02-07 PROCEDURE — 74011250637 HC RX REV CODE- 250/637: Performed by: INTERNAL MEDICINE

## 2022-02-07 PROCEDURE — 97530 THERAPEUTIC ACTIVITIES: CPT

## 2022-02-07 PROCEDURE — 82306 VITAMIN D 25 HYDROXY: CPT

## 2022-02-07 PROCEDURE — 74011250637 HC RX REV CODE- 250/637: Performed by: ORTHOPAEDIC SURGERY

## 2022-02-07 PROCEDURE — 74011000250 HC RX REV CODE- 250: Performed by: INTERNAL MEDICINE

## 2022-02-07 PROCEDURE — 2709999900 HC NON-CHARGEABLE SUPPLY

## 2022-02-07 PROCEDURE — 36415 COLL VENOUS BLD VENIPUNCTURE: CPT

## 2022-02-07 PROCEDURE — 65660000000 HC RM CCU STEPDOWN

## 2022-02-07 PROCEDURE — 82962 GLUCOSE BLOOD TEST: CPT

## 2022-02-07 PROCEDURE — 74011636637 HC RX REV CODE- 636/637: Performed by: INTERNAL MEDICINE

## 2022-02-07 PROCEDURE — 85018 HEMOGLOBIN: CPT

## 2022-02-07 PROCEDURE — 51798 US URINE CAPACITY MEASURE: CPT

## 2022-02-07 PROCEDURE — 74011000250 HC RX REV CODE- 250: Performed by: ORTHOPAEDIC SURGERY

## 2022-02-07 RX ORDER — DIVALPROEX SODIUM 125 MG/1
125 CAPSULE, COATED PELLETS ORAL EVERY 8 HOURS
Status: DISCONTINUED | OUTPATIENT
Start: 2022-02-07 | End: 2022-02-08 | Stop reason: HOSPADM

## 2022-02-07 RX ADMIN — SODIUM CHLORIDE, PRESERVATIVE FREE 10 ML: 5 INJECTION INTRAVENOUS at 05:28

## 2022-02-07 RX ADMIN — Medication 1 AMPULE: at 08:52

## 2022-02-07 RX ADMIN — DIVALPROEX SODIUM 125 MG: 125 CAPSULE ORAL at 13:45

## 2022-02-07 RX ADMIN — DIVALPROEX SODIUM 125 MG: 125 CAPSULE ORAL at 21:11

## 2022-02-07 RX ADMIN — ACETAMINOPHEN 650 MG: 325 TABLET ORAL at 05:44

## 2022-02-07 RX ADMIN — ACETAMINOPHEN 650 MG: 325 TABLET ORAL at 13:45

## 2022-02-07 RX ADMIN — INSULIN LISPRO 2 UNITS: 100 INJECTION, SOLUTION INTRAVENOUS; SUBCUTANEOUS at 16:50

## 2022-02-07 RX ADMIN — ASPIRIN 325 MG: 325 TABLET, COATED ORAL at 08:52

## 2022-02-07 RX ADMIN — INSULIN LISPRO 2 UNITS: 100 INJECTION, SOLUTION INTRAVENOUS; SUBCUTANEOUS at 11:38

## 2022-02-07 RX ADMIN — OXYCODONE HYDROCHLORIDE 2.5 MG: 5 TABLET ORAL at 00:26

## 2022-02-07 RX ADMIN — SENNOSIDES AND DOCUSATE SODIUM 2 TABLET: 8.6; 5 TABLET ORAL at 08:52

## 2022-02-07 RX ADMIN — PRAVASTATIN SODIUM 20 MG: 20 TABLET ORAL at 21:11

## 2022-02-07 RX ADMIN — POLYETHYLENE GLYCOL 3350 17 G: 17 POWDER, FOR SOLUTION ORAL at 08:50

## 2022-02-07 RX ADMIN — ACETAMINOPHEN 650 MG: 325 TABLET ORAL at 21:11

## 2022-02-07 RX ADMIN — SODIUM CHLORIDE, PRESERVATIVE FREE 10 ML: 5 INJECTION INTRAVENOUS at 13:46

## 2022-02-07 RX ADMIN — SODIUM CHLORIDE, PRESERVATIVE FREE 10 ML: 5 INJECTION INTRAVENOUS at 21:11

## 2022-02-07 RX ADMIN — Medication 1 AMPULE: at 21:11

## 2022-02-07 RX ADMIN — FERROUS SULFATE TAB 325 MG (65 MG ELEMENTAL FE) 325 MG: 325 (65 FE) TAB at 08:52

## 2022-02-07 NOTE — PROGRESS NOTES
Problem: Falls - Risk of  Goal: *Absence of Falls  Description: Document Ansley Salmeron Fall Risk and appropriate interventions in the flowsheet.   Outcome: Progressing Towards Goal  Note: Fall Risk Interventions:  Mobility Interventions: Bed/chair exit alarm,Communicate number of staff needed for ambulation/transfer,Patient to call before getting OOB    Mentation Interventions: Bed/chair exit alarm,Reorient patient    Medication Interventions: Patient to call before getting OOB,Teach patient to arise slowly    Elimination Interventions: Bed/chair exit alarm,Call light in reach,Patient to call for help with toileting needs    History of Falls Interventions: Bed/chair exit alarm,Room close to nurse's station         Problem: Patient Education: Go to Patient Education Activity  Goal: Patient/Family Education  Outcome: Progressing Towards Goal     Problem: Hip Fracture:Day of Admission Pre-op  Goal: Diagnostic Test/Procedures  Outcome: Progressing Towards Goal     Problem: Hip Fracture  Goal: Respiratory  Outcome: Progressing Towards Goal

## 2022-02-07 NOTE — PROGRESS NOTES
CM contacted [facility] for bed availability for placement. CM spoke with Bao Diego in admissions. The facility is testing today so no admissions accepted this day. CM will follow up for possible discharge to the facility on Tues., 2/8.

## 2022-02-07 NOTE — PROGRESS NOTES
Hospitalist Progress note   Admit Date:  2022  8:07 PM   Name:  Thang Becerra   Age:  80 y.o. Sex:  male  :  1935   MRN:  [id number]   Room:  [facility]/    Presenting Complaint: Hip Pain    Reason(s) for Admission: Sinus bradycardia [R00.1]  Closed displaced fracture of left femoral neck (HCC) [S72.002A]     Interval Hx/Subjective:   Thang Becerra is a 80 y.o. male with a h/o vascular dementia and DM admitted on  with left femoral neck fracture following a fall. Patient was found to be in Mobitz type II heart block for which cardiology was notified and patient is being scheduled for pacemaker this afternoon. X-rays show a L femoral neck fracture. He was incidentally noted to have a sinus bradycardia in the 30s-40s so Cardiology was notified. At the bedside it appears c/w Mobitz 2.     :  Patient is status post pacemaker placement on 2/3. Heart rate is now within normal range. Patient is n.p.o. since midnight for left hip surgery this morning. No reported fever, vomiting, chest pain or diarrhea overnight. : Patient laying in bed. Will respond but cannot answer orientation question which is chronic per patient's history given his dementia. Unable to obtain any history or review of system from the patient. : Patient alert sitting in the chair. Able to communicate but cannot answer specific question. Unable to obtain history or review of system from the patient. : Patient was agitated in the morning but easily redirectable. Unable to provide history and review of system. Assessment & Plan:   # L femoral neck fracture  :  Plan for ORIF on   N.p.o. since midnight  Pain control with as needed morphine and oxycodone. Switching Flexeril from as needed to 3 times daily. DVT prophylaxis with aspirin 325 mg p.o. daily x 35 doses  PT/OT eval after surgery    : DVT prophylaxis per surgery. Lidocaine patch. Started on Tylenol standing. Decrease the dose of oxycodone and avoid IV opioids given patient dementia. Change Flexeril to as needed. PT OT recommended rehab. February 6: Pain well controlled on current regimen. Vitamin D ordered with next blood draw. February 7: Vitamin D 97. # Sinus bradycardia  2/4: Status post pacemaker placement on 2/3. Heart rate is now within normal range. EKG with Mobitz type II on admission. Echo with normal EF 60 to 65% with normal diastolic dysfunction. February 5: Will monitor. TSH within normal limit. # Urinary retention: Currently no room for Flomax. No bowel movement documented in last 2 days. Added MiraLAX to current regimen. Will monitor. February 7: Will do voiding trial today. Holding midodrine which may help in urinary retention    # DM2  Glucose is optimally controlled, continue NovoLog sliding scale. February 5: Blood sugar at goal.  Continue current management. # Vascular dementia  Stable. Will monitor. Dispo/Discharge Planning: Pending. PT/OT/PPD. Patient will likely need to go to rehab,  assisting with discharge planning. Diet: ADULT DIET Regular; 5 carb choices (75 gm/meal); Low Sodium (2 gm)  VTE ppx: SCDs and aspirin  Code status: DNR     Updated patient's daughter. I detailed discussion w about pathophysiology of dementia and behavioral issues. Side effects benefit of Depakote discussed. Daughter wants to start patient on Depakote and see the response.   Discussed with the daughter that as long as patient can pass urine and not in discomfort, we will try to remove Fallon as risk of having Fallon in delirium/injury with it is higher in patient with dementia for    Hospital Problems as of 2/7/2022 Date Reviewed: 2/2/2022          Codes Class Noted - Resolved POA    Sinus bradycardia ICD-10-CM: R00.1  ICD-9-CM: 427.89  2/2/2022 - Present Yes        * (Principal) Closed displaced fracture of left femoral neck ([facility] [address] ICD-10-CM: U30.194H  ICD-9-CM: 820.8  2/2/2022 - Present Yes        Vascular dementia [facility]) ICD-10-CM: F01.50  ICD-9-CM: 290.40  2/2/2022 - Present Yes        Diabetes mellitus type 2, controlled ([facility] ICD-10-CM: E11.9  ICD-9-CM: 250.00  2/2/2022 - Present Unknown                Objective:     Patient Vitals for the past 24 hrs:   Temp Pulse Resp BP SpO2   02/07/22 1140 98.6 °F (37 °C) 80 16 119/64 95 %   02/07/22 0852 -- -- -- 129/63 --   02/07/22 0719 97.7 °F (36.5 °C) 65 16 (!) 132/58 98 %   02/07/22 0529 98.4 °F (36.9 °C) 76 16 134/62 95 %   02/07/22 0024 98.4 °F (36.9 °C) 65 16 125/64 97 %   02/07/22 0000 -- 65 -- -- --   02/06/22 2038 98.8 °F (37.1 °C) 71 18 132/70 100 %   02/06/22 2000 -- 71 -- -- --   02/06/22 1647 98.4 °F (36.9 °C) 64 16 126/62 96 %   02/06/22 1645 -- 64 -- 126/62 --     Oxygen Therapy  O2 Sat (%): 95 % (02/07/22 1140)  Pulse via Oximetry: 65 beats per minute (02/04/22 1033)  O2 Device: None (Room air) (02/07/22 1225)  Skin Assessment: Clean, dry, & intact (02/05/22 0414)  Skin Protection for O2 Device: No (02/05/22 0414)  O2 Flow Rate (L/min): 1 l/min (02/05/22 0414)    Estimated body mass index is 24.2 kg/m² as calculated from the following:    Height as of this encounter: 5' 7\" (1.702 m). Weight as of this encounter: 70.1 kg (154 lb 8 oz). Intake/Output Summary (Last 24 hours) at 2/7/2022 1304  Last data filed at 2/7/2022 1246  Gross per 24 hour   Intake 700 ml   Output 675 ml   Net 25 ml         Physical Exam:  Blood pressure 119/64, pulse 80, temperature 98.6 °F (37 °C), resp. rate 16, height 5' 7\" (1.702 m), weight 70.1 kg (154 lb 8 oz), SpO2 95 %. General:    Elderly, alert awake, on 1 to 2 L via NC, NAD  HEENT:           Head NCAT, PERRLA positive, MMM  Neck:   supple  CV:     regular rhythm. No m/r/g. No jugular venous distension. Pacemaker present  Lungs:   CTA B  Abdomen: Bowel sounds present. Soft, nontender, nondistended.   Extremities: Left lower extremity with immobilizer  Skin:     No rashes and normal coloration. Warm and dry. Neuro:  Not following commands. Speaking. Moving all 4 extremities  Psych:  Cognitive impairment    I have reviewed ordered lab tests and independently visualized imaging below:    Last 24hr Labs:  Recent Results (from the past 24 hour(s))   GLUCOSE, POC    Collection Time: 02/06/22  4:07 PM   Result Value Ref Range    Glucose (POC) 197 (H) 65 - 100 mg/dL    Performed by Rock [address], POC    Collection Time: 02/06/22  9:33 PM   Result Value Ref Range    Glucose (POC) 121 (H) 65 - 100 mg/dL    Performed by Jan    HEMOGLOBIN    Collection Time: 02/07/22  5:49 AM   Result Value Ref Range    HGB 10.8 (L) 13.6 - 17.2 g/dL   VITAMIN D, 25 HYDROXY    Collection Time: 02/07/22  5:49 AM   Result Value Ref Range    Vitamin D 25-Hydroxy 97.9 30.0 - 100.0 ng/mL   GLUCOSE, POC    Collection Time: 02/07/22  7:07 AM   Result Value Ref Range    Glucose (POC) 106 (H) 65 - 100 mg/dL    Performed by [place]    GLUCOSE, POC    Collection Time: 02/07/22 11:15 AM   Result Value Ref Range    Glucose (POC) 164 (H) 65 - 100 mg/dL    Performed by DodieTate        All Micro Results     Procedure Component Value Units Date/Time    COVID-19 RAPID TEST [[id number]] Collected: 02/04/22 0602    Order Status: Completed Specimen: Nasopharyngeal Updated: 02/04/22 0700     Specimen source NASAL        COVID-19 rapid test Not detected        Comment:      The specimen is NEGATIVE for SARS-CoV-2, the novel coronavirus associated with COVID-19. A negative result does not rule out COVID-19. This test has been authorized by the FDA under an Emergency Use Authorization (EUA) for use by authorized laboratories.         Fact sheet for Healthcare Providers: ConventionUpdate.co.nz  Fact sheet for Patients: ConventionUpdate.co.nz       Methodology: Isothermal Nucleic Acid Amplification         MSSA/MRSA SC BY PCR, NASAL SWAB [[id number]] Collected: 02/03/22 0430    Order Status: Canceled Specimen: Nasal swab     MSSA/MRSA SC BY PCR, NASAL SWAB [[id number]] Collected: 02/03/22 0400    Order Status: Canceled Specimen: Nasal swab           Other Studies:  No results found. Signed:  Kayla Killian MD    Part of this note may have been written by using a voice dictation software. The note has been proof read but may still contain some grammatical/other typographical errors.

## 2022-02-07 NOTE — PROGRESS NOTES
ACUTE PHYSICAL THERAPY GOALS:  (Developed with and agreed upon by patient and/or caregiver.)  1. Mr. Noman Childers will perform supine to sit and sit to supine with min assist of 1 in 7 days. 2.  Mr. Noman Childers will perform sit to stand and bed to chair with max of 1 in 7 days. 3.  Mr. Noman Childers will perform gait with rolling walker 25 ft with mod assist of 2 in 7 days. 4.  Mr. Noman Childers will perform active ROM x 10 reps to left leg in 7 days. PHYSICAL THERAPY: Daily Note and PM Treatment Day # 3     WBAT L LE; Hip Precautions  L UE in sling s/p pacemaker placement    Neeraj Harper is a 80 y.o. male   PRIMARY DIAGNOSIS: Closed displaced fracture of left femoral neck (HCC)  Sinus bradycardia [R00.1]  Closed displaced fracture of left femoral neck (HCC) [S72.002A]  Procedure(s) (LRB):  LEFT HIP HEMIARTHROPLASTY CHOICE ANES  ROOM 1105 (Left)  3 Days Post-Op    ASSESSMENT:     REHAB RECOMMENDATIONS: CURRENT LEVEL OF FUNCTION:  (Most Recently Demonstrated)   Recommendation to date pending progress:  Setting:   Short-term Rehab  Equipment:    To Be Determined Bed Mobility:   Moderate Assistance  Sit to Stand:   Maximal Assistance x 2  Transfers:   Maximal Assistance x 2  Gait/Mobility:   Maximal Assistance x 2     ASSESSMENT:  Mr. Noman Childers demonstrated improved command following and decreased agitation this PM with family present. Additional time still required for mobility and constant cueing to maintain focus on tasks. Bed mobility with mod A and cueing for technique. Once EOB, sitting balance was fair to fair minus with cueing for sitting upright. He participated in a few seated exercises with max cueing before returning to supine.         SUBJECTIVE:   Mr. Noman Childers states, \"now, don't pull on my leg\"    SOCIAL HISTORY/ LIVING ENVIRONMENT: Dementia, ambulatory  Home Environment: Skilled nursing facility  One/Two Story Residence: One story  Living Alone: No  Support Systems: Child(kyle) (Patient's daughter)  OBJECTIVE: PAIN: VITAL SIGNS: LINES/DRAINS:   Pre Treatment: Pain Screen  Pain Scale 1: Numeric (0 - 10)  Pain Intensity 1: 0  Post Treatment: FLACC 0 at rest   IV  O2 Device: None (Room air)     MOBILITY: I Mod I S SBA CGA Min Mod Max Total  NT x2 Comments:   Bed Mobility    Rolling [] [] [] [] [] [] [] [] [] [x] []    Supine to Sit [] [] [] [] [] [] [x] [] [] [] []    Scooting [] [] [] [] [] [] [x] [] [] [] []    Sit to Supine [] [] [] [] [] [] [x] [] [] [] []    Transfers    Sit to Stand [] [] [] [] [] [] [] [] [] [x] []    Bed to Chair [] [] [] [] [] [] [] [] [] [x] []    Stand to Sit [] [] [] [] [] [] [] [] [] [x] []    I=Independent, Mod I=Modified Independent, S=Supervision, SBA=Standby Assistance, CGA=Contact Guard Assistance,   Min=Minimal Assistance, Mod=Moderate Assistance, Max=Maximal Assistance, Total=Total Assistance, NT=Not Tested    BALANCE: Good Fair+ Fair Fair- Poor NT Comments   Sitting Static [] [] [x] [x] [] []    Sitting Dynamic [] [] [x] [x] [] []              Standing Static [] [] [] [] [] [x]    Standing Dynamic [] [] [] [] [] [x]      GAIT: I Mod I S SBA CGA Min Mod Max Total  NT x2 Comments:   Level of Assistance [] [] [] [] [] [] [] [] [] [x] []    Distance N/A    DME Rolling Walker and Gait Belt    Gait Quality Stand pivot transfer only    Weightbearing  Status WBAT, L LE with posterior hip precautions     I=Independent, Mod I=Modified Independent, S=Supervision, SBA=Standby Assistance, CGA=Contact Guard Assistance,   Min=Minimal Assistance, Mod=Moderate Assistance, Max=Maximal Assistance, Total=Total Assistance, NT=Not Tested    PLAN:   FREQUENCY/DURATION: PT Plan of Care: BID for duration of hospital stay or until stated goals are met, whichever comes first.  TREATMENT:     TREATMENT:   ($$ Therapeutic Activity: 23-37 mins    )  Therapeutic Activity (23 Minutes):  Therapeutic activity included Supine to Sit, Sit to Supine, Scooting, Sitting balance  and LE exercises to improve functional Mobility, Strength, ROM and Activity tolerance. At this time, patient is appropriate for Co-treatment with physical therapy due to patient's decreased overall endurance/tolerance levels, as well as need for high level skilled assistance to complete functional transfers/mobility and functional tasks. Thang Becerra is appropriate for a multidisciplinary co-treatment of PT and OT to address the goals of both disciplines.      TREATMENT GRID:  N/A    AFTER TREATMENT POSITION/PRECAUTIONS:  Alarm Activated, Bed, Needs within reach, RN notified and Visitors at bedside    INTERDISCIPLINARY COLLABORATION:  RN/PCT and PT/PTA    TOTAL TREATMENT DURATION:  PT Patient Time In/Time Out  Time In: 1345  Time Out: Cinthia Melissa 1397, PTA

## 2022-02-07 NOTE — ROUTINE PROCESS
Bedside and verbal shift report received from Edvin [facility] and Tam savage RN.

## 2022-02-07 NOTE — PROGRESS NOTES
ACUTE OT GOALS:  (Developed with and agreed upon by patient and/or caregiver.)  1. Patient will complete grooming ADL with min A.   2. Patient will complete functional transfers with MOD A X2 and adaptive equipment as needed. 3. Patient will feed self entire meal with MIN A.   4. Patient will follow commands for 75% of treatment session for increased participation in ADLs. 5. Patient will tolerate sitting EOB for 10 minutes while completing functional activity with fair+ sitting balance. OCCUPATIONAL THERAPY: Daily Note OT Treatment Day # 3    Charmaine Dc is a 80 y.o. male   PRIMARY DIAGNOSIS: Closed displaced fracture of left femoral neck (HCC)  Sinus bradycardia [R00.1]  Closed displaced fracture of left femoral neck (HCC) [S72.002A]  Procedure(s) (LRB):  LEFT HIP HEMIARTHROPLASTY CHOICE ANES  ROOM 1105 (Left)  3 Days Post-Op  [place] / [place] / Product Type: Medicare /   ASSESSMENT:     REHAB RECOMMENDATIONS: CURRENT LEVEL OF FUNCTION:  (Most Recently Demonstrated)   Recommendation to date pending progress:  Setting:   Short-term Rehab  Equipment:    Rolling Walker Bathing:   Minimal Assistance  Dressing:   Total Assistance-socks   Feeding/Grooming:   Set Up  Toileting:   Not tested  Functional Mobility:   Maximal Assistance X 2      ASSESSMENT:  Mr. Phuc Dc was supine in bed upon arrival. Pt completed completed bed mobility and functional transfer with max A X 2. Pt completed grooming, bathing and dressing with the assistance listed below while sitting EOB. Pt was resistant this am. Continue POC. SUBJECTIVE:   Mr. Phuc Dc states, \"I cant do it. \"    SOCIAL HISTORY/LIVING ENVIRONMENT:   Home Environment: Skilled nursing facility  One/Two Story Residence: One story  Living Alone: No  Support Systems: Child(kyle) (Patient's daughter)    OBJECTIVE:     PAIN: VITAL SIGNS: LINES/DRAINS:   Pre Treatment: Pain Screen  Pain Scale 1: Numeric (0 - 10)  Pain Intensity 1: 05  Post Treatment: 5   Fallon Catheter and IV  O2 Device: None (Room air)     ACTIVITIES OF DAILY LIVING: I Mod I S SBA CGA Min Mod Max Total NT Comments   BASIC ADLs:              Bathing/ Showering [] [] [x] [] [] [x] [] [] [] [] Set up- UB  Min A- LB   Toileting [] [] [] [] [] [] [] [] [] []    Dressing [] [] [x] [] [] [] [] [] [x] [] Set up- UB  Total A- LB   Feeding [] [] [] [] [] [] [] [] [] []    Grooming [] [] [x] [] [] [] [] [] [] []    Personal Device Care [] [] [] [] [] [] [] [] [] []    Functional Mobility [] [] [] [] [] [] [] [x] [] []    I=Independent, Mod I=Modified Independent, S=Supervision, SBA=Standby Assistance, CGA=Contact Guard Assistance,   Min=Minimal Assistance, Mod=Moderate Assistance, Max=Maximal Assistance, Total=Total Assistance, NT=Not Tested    MOBILITY: I Mod I S SBA CGA Min Mod Max Total  NT x2 Comments:   Supine to sit [] [] [] [] [] [] [] [x] [] [] [x]    Sit to supine [] [] [] [] [] [] [] [x] [] [] [x]    Sit to stand [] [] [] [] [] [] [] [x] [] [] [x]    Bed to chair [] [] [] [] [] [] [] [] [] [x] []    I=Independent, Mod I=Modified Independent, S=Supervision, SBA=Standby Assistance, CGA=Contact Guard Assistance,   Min=Minimal Assistance, Mod=Moderate Assistance, Max=Maximal Assistance, Total=Total Assistance, NT=Not Tested    BALANCE: Good Fair+ Fair Fair- Poor NT Comments   Sitting Static [x] [] [] [] [] []    Sitting Dynamic [x] [] [] [] [] []              Standing Static [] [] [] [] [x] []    Standing Dynamic [] [] [] [] [x] []      PLAN:   FREQUENCY/DURATION: OT Plan of Care: 4 times/week for duration of hospital stay or until stated goals are met, whichever comes first.    TREATMENT:   TREATMENT:   ($$ Self Care/Home Management: 38-52 mins    )  Self Care (24 Minutes): Self care including Grooming to increase independence and decrease level of assistance required.     TREATMENT GRID:  N/A    AFTER TREATMENT POSITION/PRECAUTIONS:  Bed, Needs within reach and RN notified    INTERDISCIPLINARY COLLABORATION:  RN/PCT, PT/PTA and OT/PEREZ    TOTAL TREATMENT DURATION:  OT Patient Time In/Time Out  Time In: 3141  Time Out: [address] Aranza Caraballo

## 2022-02-07 NOTE — PROGRESS NOTES
2022         Post Op day: 3 Days Post-Op Procedure(s) (LRB):  LEFT HIP HEMIARTHROPLASTY CHOICE ANES  ROOM 1105 (Left)      Admit Date: 2022  Admit Diagnosis: Sinus bradycardia [R00.1]  Closed displaced fracture of left femoral neck (HCC) [S72.002A]       Principle Problem: Closed displaced fracture of left femoral neck ([address] Subjective: Doing ok, No complaints, No SOB, No Chest Pain, No Nausea or Vomiting     Objective:   Vital Signs are Stable, No Acute Distress, Alert,  Dressing is Dry,  Neurovascular exam is normal.     Assessment / Plan :  Patient Active Problem List   Diagnosis Code    Sinus bradycardia R00.1    Closed displaced fracture of left femoral neck ([address] S72.002A    Vascular dementia ([facility] F01.50    Diabetes mellitus type 2, controlled ([facility] [facility] E11.9      Patient Vitals for the past 8 hrs:   BP Temp Pulse Resp SpO2 Weight   22 0529 134/62 98.4 °F (36.9 °C) 76 16 95 % 154 lb 8 oz (70.1 kg)    Temp (24hrs), Av.5 °F (36.9 °C), Min:98.4 °F (36.9 °C), Max:98.8 °F (37.1 °C)    Body mass index is 24.2 kg/m².     Lab Results   Component Value Date/Time    HGB 10.8 (L) 2022 05:49 AM          S/P Procedure(s) (LRB):  LEFT HIP HEMIARTHROPLASTY CHOICE ANES  ROOM 1105 (Left) by Dr. Dave Up 2/    Pacemaker placed 2/3   Medical Mgmt per hospitalist  Anticoagulation plan: ASA 325mg daily  Continue PT  Fall Precautions  DC disp: rehab placement   F/U: 2 weeks postop for wound check and staple removal        Signed By: GAURANG Navarro  2022,  8:42 AM

## 2022-02-07 NOTE — PROGRESS NOTES
ACUTE PHYSICAL THERAPY GOALS:  (Developed with and agreed upon by patient and/or caregiver.)  1. Mr. Kanwal Garcia will perform supine to sit and sit to supine with min assist of 1 in 7 days. 2.  Mr. Kanwal Garcia will perform sit to stand and bed to chair with max of 1 in 7 days. 3.  Mr. Kanwal Garcia will perform gait with rolling walker 25 ft with mod assist of 2 in 7 days. 4.  Mr. Kanwal Garcia will perform active ROM x 10 reps to left leg in 7 days. PHYSICAL THERAPY: Daily Note and AM Treatment Day # 3     WBAT L LE; Hip Precautions  L UE in sling s/p pacemaker placement    Juana Howe is a 80 y.o. male   PRIMARY DIAGNOSIS: Closed displaced fracture of left femoral neck (HCC)  Sinus bradycardia [R00.1]  Closed displaced fracture of left femoral neck (HCC) [S72.002A]  Procedure(s) (LRB):  LEFT HIP HEMIARTHROPLASTY CHOICE ANES  ROOM 1105 (Left)  3 Days Post-Op    ASSESSMENT:     REHAB RECOMMENDATIONS: CURRENT LEVEL OF FUNCTION:  (Most Recently Demonstrated)   Recommendation to date pending progress:  Setting:   Short-term Rehab  Equipment:    To Be Determined Bed Mobility:   Maximal Assistance x 2  Sit to Stand:   Maximal Assistance x 2  Transfers:   Maximal Assistance x 2  Gait/Mobility:   Maximal Assistance x 2     ASSESSMENT:  Mr. Kanwal Garcia demonstrated no progress toward goals this session due to increased confusion and resistance to participation. Extra encouragement, comforting, and direction for participation. Max Ax2 for all mobility throughout. Today unable to take more than a shuffled step between bed and BSC. Extended time for repositioning in bed and constant cueing for safety awareness as patient continues to attempt to pull lines. SUBJECTIVE:   Mr. Kanwal Garcia states, Jeremias Boys are these?  Get these off my hands\"    SOCIAL HISTORY/ LIVING ENVIRONMENT: Dementia, ambulatory  Home Environment: Skilled nursing facility  One/Two Story Residence: One story  Living Alone: No  Support Systems: Child(kyle) (Patient's garo)  OBJECTIVE:     PAIN: VITAL SIGNS: LINES/DRAINS:   Pre Treatment: Pain Screen  Pain Scale 1: Numeric (0 - 10)  Pain Intensity 1: 0  Post Treatment: FLACC 0 at rest   Fallon Catheter and IV  O2 Device: None (Room air)     MOBILITY: I Mod I S SBA CGA Min Mod Max Total  NT x2 Comments:   Bed Mobility    Rolling [] [] [] [] [] [] [] [] [] [x] []    Supine to Sit [] [] [] [] [] [] [] [x] [] [] [x]    Scooting [] [] [] [] [] [] [] [x] [] [] [x]    Sit to Supine [] [] [] [] [] [] [x] [] [] [] [x]    Transfers    Sit to Stand [] [] [] [] [] [] [] [x] [] [] [x]    Bed to Chair [] [] [] [] [] [] [] [] [] [x] [] unsafe   Stand to Sit [] [] [] [] [] [] [] [x] [] [] [x]    I=Independent, Mod I=Modified Independent, S=Supervision, SBA=Standby Assistance, CGA=Contact Guard Assistance,   Min=Minimal Assistance, Mod=Moderate Assistance, Max=Maximal Assistance, Total=Total Assistance, NT=Not Tested    BALANCE: Good Fair+ Fair Fair- Poor NT Comments   Sitting Static [] [] [x] [x] [] []    Sitting Dynamic [] [] [] [x] [] []              Standing Static [] [] [] [] [x] []    Standing Dynamic [] [] [] [] [x] []      GAIT: I Mod I S SBA CGA Min Mod Max Total  NT x2 Comments:   Level of Assistance [] [] [] [] [] [] [] [] [] [x] [] Stand pivot transfer   Distance N/A    DME Rolling Walker and Gait Belt    Gait Quality Stand pivot transfer only    Weightbearing  Status WBAT, L LE with posterior hip precautions     I=Independent, Mod I=Modified Independent, S=Supervision, SBA=Standby Assistance, CGA=Contact Guard Assistance,   Min=Minimal Assistance, Mod=Moderate Assistance, Max=Maximal Assistance, Total=Total Assistance, NT=Not Tested    PLAN:   FREQUENCY/DURATION: PT Plan of Care: BID for duration of hospital stay or until stated goals are met, whichever comes first.  TREATMENT:     TREATMENT:   ($$ Therapeutic Activity: 38-52 mins    )  Therapeutic Activity (44 Minutes):  Therapeutic activity included Supine to Sit, Scooting, Transfer Training, Sitting balance , Standing balance and SPT to improve functional Mobility, Strength, ROM and Activity tolerance. At this time, patient is appropriate for Co-treatment with physical therapy due to patient's decreased overall endurance/tolerance levels, as well as need for high level skilled assistance to complete functional transfers/mobility and functional tasks. Renetta Watkins is appropriate for a multidisciplinary co-treatment of PT and OT to address the goals of both disciplines.      TREATMENT GRID:  N/A    AFTER TREATMENT POSITION/PRECAUTIONS:  Alarm Activated, Bed, Needs within reach and RN notified    INTERDISCIPLINARY COLLABORATION:  MD/PA/NP, RN/PCT, PT/PTA and OT/PEREZ    TOTAL TREATMENT DURATION:  PT Patient Time In/Time Out  Time In: 3404  Time Out: [address], PTA

## 2022-02-07 NOTE — ROUTINE PROCESS
Bedside and verbal shift report given to [facility] MARANDA GREEN RN and Anny RN.

## 2022-02-08 VITALS
TEMPERATURE: 97.7 F | OXYGEN SATURATION: 93 % | DIASTOLIC BLOOD PRESSURE: 88 MMHG | WEIGHT: 154.5 LBS | SYSTOLIC BLOOD PRESSURE: 119 MMHG | BODY MASS INDEX: 24.25 KG/M2 | RESPIRATION RATE: 18 BRPM | HEART RATE: 67 BPM | HEIGHT: 67 IN

## 2022-02-08 PROBLEM — I44.1 HEART BLOCK AV SECOND DEGREE: Status: RESOLVED | Noted: 2022-02-08 | Resolved: 2022-02-08

## 2022-02-08 PROBLEM — R00.1 SINUS BRADYCARDIA: Status: RESOLVED | Noted: 2022-02-02 | Resolved: 2022-02-08

## 2022-02-08 PROBLEM — I44.1 HEART BLOCK AV SECOND DEGREE: Status: ACTIVE | Noted: 2022-02-08

## 2022-02-08 LAB
ANION GAP SERPL CALC-SCNC: 6 MMOL/L (ref 7–16)
BUN SERPL-MCNC: 15 MG/DL (ref 8–23)
CALCIUM SERPL-MCNC: 8.3 MG/DL (ref 8.3–10.4)
CHLORIDE SERPL-SCNC: 104 MMOL/L (ref 98–107)
CO2 SERPL-SCNC: 28 MMOL/L (ref 21–32)
COVID-19 RAPID TEST, COVR: NOT DETECTED
CREAT SERPL-MCNC: 0.7 MG/DL (ref 0.8–1.5)
ERYTHROCYTE [DISTWIDTH] IN BLOOD BY AUTOMATED COUNT: 12.1 % (ref 11.9–14.6)
GLUCOSE BLD STRIP.AUTO-MCNC: 152 MG/DL (ref 65–100)
GLUCOSE BLD STRIP.AUTO-MCNC: 162 MG/DL (ref 65–100)
GLUCOSE SERPL-MCNC: 169 MG/DL (ref 65–100)
HCT VFR BLD AUTO: 28.7 % (ref 41.1–50.3)
HGB BLD-MCNC: 9.9 G/DL (ref 13.6–17.2)
MCH RBC QN AUTO: 30 PG (ref 26.1–32.9)
MCHC RBC AUTO-ENTMCNC: 34.5 G/DL (ref 31.4–35)
MCV RBC AUTO: 87 FL (ref 79.6–97.8)
NRBC # BLD: 0 K/UL (ref 0–0.2)
PLATELET # BLD AUTO: 160 K/UL (ref 150–450)
PMV BLD AUTO: 10.2 FL (ref 9.4–12.3)
POTASSIUM SERPL-SCNC: 3.3 MMOL/L (ref 3.5–5.1)
RBC # BLD AUTO: 3.3 M/UL (ref 4.23–5.6)
SERVICE CMNT-IMP: ABNORMAL
SERVICE CMNT-IMP: ABNORMAL
SODIUM SERPL-SCNC: 138 MMOL/L (ref 136–145)
SOURCE, COVRS: NORMAL
WBC # BLD AUTO: 5 K/UL (ref 4.3–11.1)

## 2022-02-08 PROCEDURE — 97112 NEUROMUSCULAR REEDUCATION: CPT

## 2022-02-08 PROCEDURE — 74011250637 HC RX REV CODE- 250/637: Performed by: INTERNAL MEDICINE

## 2022-02-08 PROCEDURE — 74011000250 HC RX REV CODE- 250: Performed by: INTERNAL MEDICINE

## 2022-02-08 PROCEDURE — 82962 GLUCOSE BLOOD TEST: CPT

## 2022-02-08 PROCEDURE — 97530 THERAPEUTIC ACTIVITIES: CPT

## 2022-02-08 PROCEDURE — 74011250637 HC RX REV CODE- 250/637: Performed by: ORTHOPAEDIC SURGERY

## 2022-02-08 PROCEDURE — 85027 COMPLETE CBC AUTOMATED: CPT

## 2022-02-08 PROCEDURE — 87635 SARS-COV-2 COVID-19 AMP PRB: CPT

## 2022-02-08 PROCEDURE — 74011636637 HC RX REV CODE- 636/637: Performed by: INTERNAL MEDICINE

## 2022-02-08 PROCEDURE — 80048 BASIC METABOLIC PNL TOTAL CA: CPT

## 2022-02-08 RX ORDER — POTASSIUM CHLORIDE 20 MEQ/1
40 TABLET, EXTENDED RELEASE ORAL
Status: COMPLETED | OUTPATIENT
Start: 2022-02-08 | End: 2022-02-08

## 2022-02-08 RX ORDER — LIDOCAINE 4 G/100G
PATCH TOPICAL
Qty: 30 PATCH | Refills: 0 | Status: SHIPPED | OUTPATIENT
Start: 2022-02-09 | End: 2022-05-09

## 2022-02-08 RX ORDER — MIDODRINE HYDROCHLORIDE 5 MG/1
5 TABLET ORAL
Qty: 30 TABLET | Refills: 0 | Status: SHIPPED
Start: 2022-02-08 | End: 2022-05-09

## 2022-02-08 RX ORDER — AMOXICILLIN 250 MG
2 CAPSULE ORAL DAILY
Qty: 60 TABLET | Refills: 0 | Status: SHIPPED
Start: 2022-02-09

## 2022-02-08 RX ORDER — OXYCODONE HYDROCHLORIDE 5 MG/1
2.5 TABLET ORAL
Qty: 6 TABLET | Refills: 0 | Status: SHIPPED | OUTPATIENT
Start: 2022-02-08 | End: 2022-02-11

## 2022-02-08 RX ORDER — ACETAMINOPHEN 325 MG/1
650 TABLET ORAL EVERY 8 HOURS
Qty: 30 TABLET | Refills: 0 | Status: SHIPPED
Start: 2022-02-08

## 2022-02-08 RX ORDER — ASPIRIN 325 MG
325 TABLET, DELAYED RELEASE (ENTERIC COATED) ORAL DAILY
Qty: 28 TABLET | Refills: 0 | Status: SHIPPED
Start: 2022-02-09 | End: 2022-03-09

## 2022-02-08 RX ORDER — INSULIN LISPRO 100 [IU]/ML
INJECTION, SOLUTION INTRAVENOUS; SUBCUTANEOUS
Qty: 1 EACH | Refills: 0 | Status: SHIPPED
Start: 2022-02-08

## 2022-02-08 RX ORDER — DIVALPROEX SODIUM 125 MG/1
125 CAPSULE, COATED PELLETS ORAL EVERY 8 HOURS
Qty: 30 CAPSULE | Refills: 0 | Status: SHIPPED
Start: 2022-02-08

## 2022-02-08 RX ORDER — OXYCODONE HYDROCHLORIDE 5 MG/1
2.5 TABLET ORAL
Qty: 6 TABLET | Refills: 0 | Status: SHIPPED
Start: 2022-02-08 | End: 2022-02-08

## 2022-02-08 RX ADMIN — Medication 1 AMPULE: at 08:14

## 2022-02-08 RX ADMIN — INSULIN LISPRO 2 UNITS: 100 INJECTION, SOLUTION INTRAVENOUS; SUBCUTANEOUS at 08:14

## 2022-02-08 RX ADMIN — ACETAMINOPHEN 650 MG: 325 TABLET ORAL at 05:01

## 2022-02-08 RX ADMIN — SENNOSIDES AND DOCUSATE SODIUM 2 TABLET: 8.6; 5 TABLET ORAL at 08:14

## 2022-02-08 RX ADMIN — DIVALPROEX SODIUM 125 MG: 125 CAPSULE ORAL at 05:01

## 2022-02-08 RX ADMIN — SODIUM CHLORIDE, PRESERVATIVE FREE 10 ML: 5 INJECTION INTRAVENOUS at 06:39

## 2022-02-08 RX ADMIN — INSULIN LISPRO 2 UNITS: 100 INJECTION, SOLUTION INTRAVENOUS; SUBCUTANEOUS at 12:30

## 2022-02-08 RX ADMIN — POTASSIUM CHLORIDE 40 MEQ: 20 TABLET, EXTENDED RELEASE ORAL at 08:15

## 2022-02-08 RX ADMIN — ASPIRIN 325 MG: 325 TABLET, COATED ORAL at 08:14

## 2022-02-08 RX ADMIN — POLYETHYLENE GLYCOL 3350 17 G: 17 POWDER, FOR SOLUTION ORAL at 08:15

## 2022-02-08 RX ADMIN — SODIUM CHLORIDE, PRESERVATIVE FREE 10 ML: 5 INJECTION INTRAVENOUS at 13:46

## 2022-02-08 RX ADMIN — FERROUS SULFATE TAB 325 MG (65 MG ELEMENTAL FE) 325 MG: 325 (65 FE) TAB at 08:15

## 2022-02-08 NOTE — DISCHARGE INSTRUCTIONS
DEVICE IMPLANT FOLLOWUP INSTRUCTIONS  You will be discharged with an occlusive dressing over the incision site. This dressing will be removed at the 10 -14-day postop site check with the [address]. Your new device will be checked at that visit and all your device teaching will be given. Keep the incision site dry until your follow up. Use a hand-held shower or bath. Do not submerge or soak in pools or tubs for 6 weeks. If you are discharged with a prescription for antibiotics, please take the full prescription until it is gone. After your site check, you may shower and get the incision site wet. You may let soap and water run on the incision and pat dry. Please do not apply creams, lotions or powders on or near the incision. Mild bruising and swelling can be normal after implant and will resolve in a few weeks. Call the office at [phone] if you have any of the following:  -Signs of infection, such as fever over 100 F, drainage from the incision, redness, significant swelling, or warmth at the incision site.  -Significant pain around the site that gets worse. Mild discomfort can be normal.   -Bleeding from the incision site  -Swelling in the arm on the side of the incision site  -Chest pain or shortness of breath     Your device has the capability of transmitting device information from home to our office using a home monitoring system or phone tyra. The information will transmit through a cellular connection outside of your home. Your device data is reviewed during working hours to ensure proper device function. You will be given your equipment and instruction upon your hospital discharge.                                                                       -You will be given a sling to wear upon discharge with instructions when it should be worn.    -Do not lift the affected arm above the shoulder level, on the side the device was put in, for 4 weeks.   -Do not lift or push more than 5 to 10 pounds for 4 weeks on the affected side. Walking is fine and encouraged.   -Driving is restricted for 5-7 days. Long travel is not recommended until after your site check.    -Do not do any vigorous upper body activities, such as golfing, bowling tennis or mowing for about 6 weeks. -If you work, you may return to work. However, with limitations on lifting for 4 weeks.   -Please avoid any dental work or invasive procedures for 6 weeks, if possible, after implant. Please avoid strong magnetic fields, large power plant transformers and arc welders. Please stay 10 feet away of electromagnetic fields such as working over a large running engine, stereo speakers and large stereo systems. Home appliances are safe. You may operate any electrical or battery-operated device in your home. Modern Devices are seldom affected by normally operating home appliances, such as microwave ovens. Flying is safe and airport metal detectors will not affect your device, although your device may occasionally set off the metal detectors. If this happens, show the  your identification card. Security wanding over the device is contraindicated. Cellular Phones should be used with the hand opposite to the side where the device was implanted. The phone should not be carried in the pocket on the side of the device. CD[employer] licenses are not renewed if you have a defibrillator implanted. Radiation Therapy should be avoided on or near the device. Should you require surgery in the future; some electrosurgical devices can interfere with the device function. You should discuss this with your surgeon prior to any operation. If you are ordered an MRI, Please contact the clinic prior to ensure you have an MRI safe device. You must wait 6 weeks after implant before you may have an MRI. Tens units are contraindicated. [facility] [phone]    ------------------------------------------------------------------------------------------------------------  [address] [facility]    IF YOU HAVE ANY PROBLEMS ONCE YOU ARE AT HOME CALL THE FOLLOWING NUMBERS:   Main office number: [phone] ask for Lawrence Rai (medical assistant with Dr. Too Franklin)  Office Address: [facility] [address] [place] [address],[address] [address] [facility], [address] [facility]      Patient Discharge Instructions    Jada Schneider / [id number] : 1935    Admitted 2022 Discharged: 2022         To be given to ROHIT [place] [address] on Admission         Weight bearing status: As tolerated with walker and assistance    Activity  · Continue Physical Therapy and Occupational Therapy   · Fall precautions     Wound Care   Dry dressing changes using sterile technique every other day or more frequently if needed    Staples are to be left in and removed in our office 2 weeks postop    Diet  · Resume regular or diabetic diet      Medications    · Patient medications are listed on the medication reconciliation sheet. Follow up    Follow up in our office in 2 weeks postop    All patients are to be transported via stretcher unless they are able to independently get out of a chair and stand without assistance. Information obtained by :  I understand that if any problems occur once I am at home I am to contact my physician. I understand and acknowledge receipt of the instructions indicated above.                                                                                                                                            Physician's or R.N.'s Signature                                                                  Date/Time                                                                                                                                              Patient or Representative Signature Date/Time          Patient Education        Learning About Surgery to Repair a Hip Fracture  What is surgery for a hip fracture? Surgery for a hip fracture repairs a broken hip bone. Broken hips are often caused by a fall or other injury. Some kinds of broken bones heal on their own in a cast. But a broken hip is not likely to heal well without surgery. This type of surgery is usually done right after a hip breaks. How is surgery for a hip fracture done? During surgery to fix a fractured hip, you will be asleep and will not feel pain. Your doctor will:  · Make one or two cuts (incisions) over the broken bone in your hip. · Move the pieces of bone back into the right position. · Attach the pieces of the bone together with metal pins, screws, rods, or plates. · Use X-rays to see if the pins and plates are in the correct place. · Stitch or staple the incisions closed. What can you expect after surgery for a hip fracture ? You will probably stay in the hospital for 2 to 4 days after surgery. Your rehabilitation program (rehab) will start at this time. If you don't have someone to help you at home, you may go from the hospital to a short-term rehabilitation center or a long-term care center. For several months, you may need the help of a walker or crutches. After that, you may need to walk with a cane. At first, you may need help with daily activities such as bathing, dressing, and cooking. Rehab will help you get back to your regular activities. But it will probably take at least 3 months to return to your normal routine. It may take 6 months to 1 year for you to fully recover. Some people, especially older people, are never able to move as well as they used to. · You will slowly return to most of your activities. ? You may be able to walk on your own in 4 to 6 weeks. Until then, you will need crutches or a walker. After that, you may need to walk with a cane. ?  Ask your doctor when you can drive again. ? You may be able to return to work in 4 weeks to 4 months, depending on your job. ? Your doctor will tell you when you can walk, swim, dance, golf, or bicycle. Ask your doctor about other activities you would like to do.  ? Your doctor may advise you to avoid more strenuous activities, such as running or tennis, or those where a fall is possible, such as horseback riding or skiing. Follow-up care is a key part of your treatment and safety. Be sure to make and go to all appointments, and call your doctor if you are having problems. It's also a good idea to know your test results and keep a list of the medicines you take. Where can you learn more? Go to http://www.gray.com/  Enter X771 in the search box to learn more about \"Learning About Surgery to Repair a Hip Fracture. \"  Current as of: July 1, 2021               Content Version: 13.0  © 2006-2021 [employer]. Care instructions adapted under license by [employer] (which disclaims liability or warranty for this information). If you have questions about a medical condition or this instruction, always ask your healthcare professional. [address] any warranty or liability for your use of this information.

## 2022-02-08 NOTE — DISCHARGE SUMMARY
Hospitalist Discharge Summary   Admit Date:  2022  8:07 PM   DC Note date: 2022  Name:  Noble Romberg   Age:  80 y.o. Sex:  male  :  1935   MRN:  [id number]   Room:  [facility]  PCP:  Mansi Condon NP    Presenting Complaint: Hip Pain    Initial Admission Diagnosis: Sinus bradycardia [R00.1]  Closed displaced fracture of left femoral neck ([facility] [S72.002A]     Problem List for this Hospitalization:  Hospital Problems as of 2022 Date Reviewed: 2022          Codes Class Noted - Resolved POA    * (Principal) Closed displaced fracture of left femoral neck ([facility] ICD-10-CM: F35.707O  ICD-9-CM: 820.8  2022 - Present Yes        Vascular dementia ([facility] ICD-10-CM: F01.50  ICD-9-CM: 290.40  2022 - Present Yes        Diabetes mellitus type 2, controlled ([facility] ICD-10-CM: E11.9  ICD-9-CM: 250.00  2022 - Present Unknown        RESOLVED: Heart block AV second degree ICD-10-CM: I44.1  ICD-9-CM: 426.13  2022 - 2022 Unknown        RESOLVED: Sinus bradycardia ICD-10-CM: R00.1  ICD-9-CM: 427.89  2022 - 2022 Yes            Did Patient have Sepsis (YES OR NO): No    Hospital Course:  Please see HPI and daily progress note for more details. Briefly, 42-year-old male with history of dementia with behavioral issues admitted to hospital after mechanical fall and found to have left hip fracture. In addition, patient found to have Mobitz type II heart block and he was evaluated by cardiology. Pacemaker was placed and after that he underwent surgery for his hip fracture. During hospitalization, patient had urinary retention which has resolved now. Also patient was constipated which has resolved now with bowel regimen. Patient does not have any abdominal discomfort and he is passing urine by himself. Discussed the plan with the patient's daughter and she is in agreement. Also there was concern that he might be getting medication for his behavioral issues at rehab.   Discussed the side effects and benefit of Depakote with patient's daughter and she is in agreement to start Depakote. After starting Depakote, we have seen significant improvement in patient's behavior. Discussed with patient's daughter about pathophysiology and natural progression of dementia and delirium. Patient is waiting for rehab bed and we were told that he will get better today. Is being discharged to rehab with instruction to follow-up with EP, orthopedic surgeon and primary care. His TSH appropriate for age and his vitamin D is greater than 30. Also, his pain is very well controlled on current regimen of Tylenol, lidocaine patch and very low-dose oxycodone which she is requiring very rarely. Controlled prescription printed and signed. Disposition: Skilled Nursing Facility  Diet: ADULT DIET Regular; 5 carb choices (75 gm/meal); Low Sodium (2 gm)  Code Status: DNR    Follow Up Orders: Follow-up Appointments   Procedures    FOLLOW UP VISIT Appointment in: Two Weeks Staple removal in office. Call  [phone] for appointment     Staple removal in office. Call  [phone] for appointment     Standing Status:   Standing     Number of Occurrences:   1     Order Specific Question:   Appointment in     Answer: Two Weeks    FOLLOW UP VISIT Appointment in: Other (Specify) PCP in 1 week. Ortho in 2 weeks as recommended. EP/device clinic in 1 week as recommended by Dr Lianet Ashton     PCP in 1 week. Ortho in 2 weeks as recommended. EP/device clinic in 1 week as recommended by Dr Lianet Ashton     Standing Status:   Standing     Number of Occurrences:   1     Standing Expiration Date:   2/9/2022     Order Specific Question:   Appointment in     Answer:    Other (Specify)       Follow-up Information     Follow up With Specialties Details Why Contact Info    [address] [facility]  On 2/16/2022 Device Clinic on Wed Feb 16th at 11:00 am in [address] [place] [address] [facility] [address] [facility] [address]ajwa) [facility] [address]  [phone]    Mariajose Jones NP Neurology Schedule an appointment as soon as possible for a visit in 1 week  [address] [facility]      Henrique Muniz, [address] [facility], Physician Assistant Schedule an appointment as soon as possible for a visit in 2 weeks  [address] [facility] [address] [facility] [address]  [address], Pan Gamez MD Clinical Cardiac Electrophysiology, Cardiology Schedule an appointment as soon as possible for a visit in 1 week  [address] [facility]  [phone]            Follow up labs/diagnostics (not limited to):  -Monitor potassium. Adjust medication for behavioral issues. -  -Ensure subspecialty follow-up including EP and orthopedic surgeon for his sutures    Time spent in patient discharge and coordination 34 minutes. Plan was discussed with patient's daughter. All questions answered. Patient was stable at time of discharge. Instructions given to call a physician or return if any concerns. Discharge Info:   Current Discharge Medication List      START taking these medications    Details   acetaminophen (TYLENOL) 325 mg tablet Take 2 Tablets by mouth every eight (8) hours. Qty: 30 Tablet, Refills: 0  Start date: 2/8/2022      divalproex (DEPAKOTE SPRINKLE) 125 mg capsule Take 1 Capsule by mouth every eight (8) hours.   Qty: 30 Capsule, Refills: 0  Start date: 2/8/2022      lidocaine 4 % patch Apply on intact skin on affected areas  Qty: 30 Patch, Refills: 0  Start date: 2/9/2022      insulin lispro (HUMALOG) 100 unit/mL injection INITIATE INSULIN CORRECTIVE PROTOCOL:  Normal Insulin Sensitivity   For Blood Sugar (mg/dL) of:     Less than 150 =   0 units           150 -199 =   2 units  200 -249 =   4 units  250 -299 =   6 units  300 -349 =   8 units  350 and above = 10 units and Call Physician     Initiate Hypoglycemia protocol if blood glucose is <70 mg/dL Fast Acting - Administer Immediately - or within 15 minutes of start of meal, if mealtime coverage. Qty: 1 Each, Refills: 0  Start date: 2/8/2022      oxyCODONE IR (ROXICODONE) 5 mg immediate release tablet Take 0.5 Tablets by mouth every six (6) hours as needed for Pain for up to 3 days. Max Daily Amount: 10 mg.  Qty: 6 Tablet, Refills: 0  Start date: 2/8/2022, End date: 2/11/2022    Associated Diagnoses: Closed fracture of left hip, initial encounter ([facility]      senna-docusate (PERICOLACE) 8.6-50 mg per tablet Take 2 Tablets by mouth daily. Qty: 60 Tablet, Refills: 0  Start date: 2/9/2022         CONTINUE these medications which have CHANGED    Details   aspirin delayed-release 325 mg tablet Take 1 Tablet by mouth daily for 28 days. Qty: 28 Tablet, Refills: 0  Start date: 2/9/2022, End date: 3/9/2022      midodrine (PROAMATINE) 5 mg tablet Take 1 Tablet by mouth every eight (8) hours as needed for PRN Reason (Other) (for upper blood pressure < 120). Qty: 30 Tablet, Refills: 0  Start date: 2/8/2022         CONTINUE these medications which have NOT CHANGED    Details   glipiZIDE (GLUCOTROL) 5 mg tablet Take  by mouth daily. ferrous sulfate (IRON) 325 mg (65 mg iron) tablet Take  by mouth Daily (before breakfast). pravastatin (PRAVACHOL) 20 mg tablet Take 20 mg by mouth nightly. pyridoxine, vitamin B6, (VITAMIN B-6) 100 mg tablet Take 100 mg by mouth daily. ascorbic acid, vitamin C, (VITAMIN C) 500 mg tablet Take  by mouth.          STOP taking these medications       fluticasone (FLONASE) 50 mcg/actuation nasal spray Comments:   Reason for Stopping:               Procedures done this admission:  Procedure(s):  LEFT HIP HEMIARTHROPLASTY CHOICE ANES  ROOM 1105    Consults this admission:  IP CONSULT TO CARDIOLOGY  IP CONSULT TO ORTHOPEDIC SURGERY    Echocardiogram/EKG results:  Results from Hospital Encounter encounter on 02/02/22    ECHO ADULT COMPLETE    Interpretation Summary    Left Ventricle: Left ventricle size is normal. Normal wall thickness. Normal wall motion. Normal left ventricular systolic function with a visually estimated EF of 60 - 65%. Normal diastolic function.   Left Atrium: Left atrium is mildly dilated.   Aorta: Aortic root is mildly dilated [4.3 cm].   Technical qualifiers: Echo study was technically difficult.        EKG Results     Procedure 720 Value Units Date/Time    EKG, 12 LEAD, INITIAL [[id number]] Collected: 02/03/22 1605    Order Status: Completed Updated: 02/04/22 1637     Ventricular Rate 77 BPM      Atrial Rate 73 BPM      QRS Duration 84 ms      Q-T Interval 368 ms      QTC Calculation (Bezet) 416 ms      Calculated P Axis 51 degrees      Calculated R Axis 13 degrees      Calculated T Axis 0 degrees      Diagnosis --     Ventricular-paced rhythm with frequent AV dual-paced complexes intermittent  Abnormal ECG  When compared with ECG of 02-FEB-2022 20:37,  PREVIOUS ECG IS PRESENT  Confirmed by Gee Francis MD ()BASSEM ([id number]) on 2/4/2022 4:37:02 PM      EKG, 12 LEAD, SUBSEQUENT [[id number]] Collected: 02/02/22 2037    Order Status: Completed Updated: 02/03/22 0716     Ventricular Rate 36 BPM      Atrial Rate 73 BPM      P-R Interval 269 ms      QRS Duration 139 ms      Q-T Interval 477 ms      QTC Calculation (Bezet) 369 ms      Calculated P Axis 71 degrees      Calculated R Axis 72 degrees      Calculated T Axis 45 degrees      Diagnosis --     Sinus bradycardia with mobitz 1  pacs  Prolonged PA interval  Nonspecific intraventricular conduction delay    Confirmed by Gee Francis MD ()BASSEM ([id number]) on 2/3/2022 7:16:45 AM      EKG 12 LEAD INITIAL [[id number]] Collected: 02/02/22 2023    Order Status: Completed Updated: 02/03/22 0715     Ventricular Rate 35 BPM      Atrial Rate 0 BPM      QRS Duration 97 ms      Q-T Interval 540 ms      QTC Calculation (Bezet) 412 ms      Calculated R Axis 68 degrees      Calculated T Axis 60 degrees      Diagnosis --     markd sinus jose with firt degree avb  Low voltage, precordial leads  Nonspecific T abnormalities, lateral leads    Confirmed by June Davis MD (), BASSEM HARRIS ([id number]) on 2/3/2022 7:15:30 AM            Diagnostic Imaging/Tests:   XR HIP LT W OR WO PELV 2-3 VWS    Result Date: 2/2/2022  EXAM: Pelvis and left hip x-rays. INDICATION: Left hip pain after falling. COMPARISON: None. TECHNIQUE: A frontal view of the pelvis was supplemented with 2 dedicated views of the left hip joint. FINDINGS: There is a displaced subcapital left femoral neck fracture. No other fractures are identified. The pelvic ring is intact. Left femoral neck fracture. XR FEMUR LT 2 V    Result Date: 2/2/2022  EXAM: Left femur x-rays. INDICATION: Pain after falling. COMPARISON: Subsequent dedicated left hip x-rays. TECHNIQUE: 4 views. FINDINGS: As noted on the hip x-ray report, there is a displaced subcapital femoral neck fracture. No fractures are seen in the more distal left femur. Note is made of vascular calcifications in the soft tissues. As above. XR CHEST PORT    Result Date: 2/3/2022  EXAM: CHEST X-RAY, 1 VIEW INDICATION: Evaluate for pneumothorax. COMPARISON: Chest x-ray 2/2/2022 TECHNIQUE: Single AP view of the chest was obtained. FINDINGS: The lungs are clear and the pulmonary vasculature is normal. No pneumothorax or pleural effusion. The cardiomediastinal silhouette is within normal limits. Dual chamber cardiac pacemaker. The osseous structures are unremarkable. No radiographic evidence of acute cardiopulmonary disease. XR CHEST PORT    Result Date: 2/2/2022  EXAM: Chest x-ray. INDICATION: Presurgical evaluation, hip fracture. COMPARISON: None. TECHNIQUE: Single frontal view. FINDINGS: The lungs are clear.  The cardiac size, mediastinal contour and pulmonary vasculature are normal. No pneumothorax or pleural effusion is seen. There are atherosclerotic calcifications in the aortic arch and mild degenerative changes in the spine. No acute displaced fracture is seen. No acute intrathoracic process. ECHO ADULT COMPLETE    Result Date: 2/3/2022    Left Ventricle: Left ventricle size is normal. Normal wall thickness. Normal wall motion. Normal left ventricular systolic function with a visually estimated EF of 60 - 65%. Normal diastolic function.   Left Atrium: Left atrium is mildly dilated.   Aorta: Aortic root is mildly dilated [4.3 cm].   Technical qualifiers: Echo study was technically difficult. ELECTROPHYSIOLOGY PROCEDURE    Result Date: 2/3/2022  : Chandu Hardy. Jose Boothe MD   REFERRING: ED physician   Pre-Procedure Diagnosis 1. Documented non-reversible symptomatic bradycardia due to sick sinus syndrome. 2. Complete heart block   Procedure Performed 1. Insertion of a Dual Chamber Pacemaker   Anesthesia: MAC   Estimated Blood Loss: Less than 10 mL       Specimens: * No specimens in log *   Complications: None   Contrast: 15 ml   Fluoroscopy Time:  2.9 minutes   Patient Information and Indications: The procedure, indications, risks, benefits, and alternatives were discussed with the patient and family members, who desired to proceed after questions were answered and informed consent was documented.   Procedure: After informed consent was obtained, the patient was brought to the Electrophysiology Laboratory in a fasting state and was prepped and draped in sterile fashion. Prophylactic antibiotic was administered 10 minutes prior to skin incision: refer to anesthesia procedural documentation for full details. Conscious sedation was administered by anesthesia with continuous oxygen saturation measurement and blood pressure measurement. A venogram of the LUE demonstrated a patent left axillary and subclavian without obvious stenosis.  Local anesthetic (sensorcaine) was delivered to the left pectoral region. An incision was made parallel to the deltopectoral groove. A subcutaneous pocket was created using blunt dissection and electrocautery, and adequate hemostasis was established. Access x 2 (Micropuncture kit) was obtained in the left axillary vein under ultrasound/fluoroscopic guidance using a modified Seldinger technique. Next, placement of a 6 Fr peel-away sheath over the first guidewire was performed. A permanent RV lead was then advanced under fluoroscopic guidance via the 6 Fr sheath into the RV apex in a stable position with satisfactory sensing and pacing characteristics. The peel away sheath was removed. A 6 Fr Safe-sheath was then placed in the second guidewire. A permanent pacing lead was advanced under fluoroscopic guidance and positioned in the right atrial appendage. Stable RA appendage position with satisfactory sensing and pacing characteristics was obtained. The sheath was peeled. The leads were sutured to the underlying pectoralis fascia using 2 non-absorbable sutures around each lead's collar. The lead slack and position was assessed under fluoroscopy while securing the lead collars to ensure proper length. Final lead testing via the pacing system analyzer (PSA) demonstrated stable sensing, impedance and pacing thresholds. The lead pins were then cleaned with antibiotic soaked gauze, dried gently, and attached to a new pacemaker generator. Pins were directly observed to pass the tip electrode, and the ring hex wrench screws were secured, and leads tug tested. The device and leads were gently positioned within the pocket after the pocket was irrigated copiously with a saline antimicrobial solution. The device was placed in a submuscular fashion and the muscular layer was closed with interrupted 3-0 Vicryl. The initial wound was closed with multiple layers of absorbable suture (3-0 Monocryl, Quill) followed by skin closure with 3-0 V-Loc.  The patient tolerated the procedure well and there were no complications. All sharps and sponges were counted x 2. The patient was transferred to the recovery area in stable condition.   Device and Lead Information Pulse Generator Model #  Serial # Location Implant/Explant [id number] Biotronik [id number] Left Pectoral Implant   Lead Model Number  Serial Number Lead position Implant/Explant RA [id number] Biotronik [id number] RA Appendage Implant RV [id number] Biotronik [id number] RV [place] Implant   Lead Sensitivity and Threshold Lead R or P sensitivity (mv) Threshold (V) Threshold PW (msec) Impedance (ohms) Final output Voltage (V) Final PW (msec) RA          2.0       0.9 0.4 448 3.0 0.4 RV 5.2 0.7 0.4 468 3.0 0.4   Bradycardia Settings Aldair Mode LRL URL Pace AVD (ms) Sense AVD (ms) Rate Response Mode Switching Mode SW Rate DDD-CLS 60 120 200 200 On On 160     Impression: 1. Successful implantation and testing of dual chamber pacemaker (MRI conditional).   Plan: -Overnight observation. -Tentatively plan for orthopedic surgery tomorrow (hip). -Routine CIED instructions. -Cardiac/EP follow up in 3-4 months or PRN. -Device clinic follow up in 1-2 weeks.   Rom [place]. Pilo Oseguera MD, MS Clinical Cardiac Electrophysiology [facility]   2/3/2022 3:44 PM         All Micro Results     Procedure Component Value Units Date/Time    COVID-19 RAPID TEST [[id number]] Collected: 02/08/22 1055    Order Status: Completed Specimen: Nasopharyngeal Updated: 02/08/22 1109     Specimen source Nasopharyngeal        COVID-19 rapid test Not detected        Comment:      The specimen is NEGATIVE for SARS-CoV-2, the novel coronavirus associated with COVID-19. A negative result does not rule out COVID-19. This test has been authorized by the FDA under an Emergency Use Authorization (EUA) for use by authorized laboratories.         Fact sheet for Healthcare Providers: ConventionUpdate.co.nz  Fact sheet for Patients: iTendency.uy       Methodology: Isothermal Nucleic Acid Amplification         COVID-19 RAPID TEST [[id number]] Collected: 02/04/22 0602    Order Status: Completed Specimen: Nasopharyngeal Updated: 02/04/22 0700     Specimen source NASAL        COVID-19 rapid test Not detected        Comment:      The specimen is NEGATIVE for SARS-CoV-2, the novel coronavirus associated with COVID-19. A negative result does not rule out COVID-19. This test has been authorized by the FDA under an Emergency Use Authorization (EUA) for use by authorized laboratories. Fact sheet for Healthcare Providers: iTendency.uy  Fact sheet for Patients: iTendency.uy       Methodology: Isothermal Nucleic Acid Amplification         MSSA/MRSA SC BY PCR, NASAL SWAB [[id number]] Collected: 02/03/22 0430    Order Status: Canceled Specimen: Nasal swab     MSSA/MRSA SC BY PCR, NASAL SWAB [[id number]] Collected: 02/03/22 0400    Order Status: Canceled Specimen: Nasal swab           Labs: Results:       BMP, Mg, Phos Recent Labs     02/08/22  0511      K 3.3*      CO2 28   AGAP 6*   BUN 15   CREA 0.70*   CA 8.3   *      CBC Recent Labs     02/08/22  0511 02/07/22  0549 02/06/22  0402   WBC 5.0  --   --    RBC 3.30*  --   --    HGB 9.9* 10.8* 11.5*   HCT 28.7*  --   --      --   --       LFT No results for input(s): ALT, TBIL, AP, TP, ALB, GLOB, AGRAT in the last 72 hours.     No lab exists for component: SGOT, GPT   Cardiac Testing No results found for: BNPP, BNP, CPK, RCK1, RCK2, RCK3, RCK4, CKMB, CKNDX, CKND1, TROPT, TROIQ   Coagulation Tests No results found for: PTP, INR, APTT, INREXT   A1c No results found for: HBA1C, XDN0IOCR   Lipid Panel No results found for: CHOL, CHOLPOCT, CHOLX, CHLST, CHOLV, 467157, HDL, HDLP, LDL, LDLC, DLDLP, 779014, VLDLC, VLDL, TGLX, TRIGL, TRIGP, TGLPOCT, CHHD, CHHDX   Thyroid Panel Lab Results Component Value Date/Time    TSH 4.540 (H) 02/02/2022 08:25 PM        Most Recent UA No results found for: COLOR, APPRN, REFSG, [place], PROTU, GLUCU, KETU, BILU, BLDU, UROU, ESTER, LEUKU, WBCU, RBCU, UEPI, BACTU, CASTS, UCRY, MUCUS, UCOM       All Labs from Last 24 Hrs:  Recent Results (from the past 24 hour(s))   GLUCOSE, POC    Collection Time: 02/07/22  4:30 PM   Result Value Ref Range    Glucose (POC) 167 (H) 65 - 100 mg/dL    Performed by Trevor    GLUCOSE, POC    Collection Time: 02/07/22  8:52 PM   Result Value Ref Range    Glucose (POC) 138 (H) 65 - 100 mg/dL    Performed by Heraclio    CBC W/O DIFF    Collection Time: 02/08/22  5:11 AM   Result Value Ref Range    WBC 5.0 4.3 - 11.1 K/uL    RBC 3.30 (L) 4.23 - 5.6 M/uL    HGB 9.9 (L) 13.6 - 17.2 g/dL    HCT 28.7 (L) 41.1 - 50.3 %    MCV 87.0 79.6 - 97.8 FL    MCH 30.0 26.1 - 32.9 PG    MCHC 34.5 31.4 - 35.0 g/dL    RDW 12.1 11.9 - 14.6 %    PLATELET 080 120 - 528 K/uL    MPV 10.2 9.4 - 12.3 FL    ABSOLUTE NRBC 0.00 0.0 - 0.2 K/uL   METABOLIC PANEL, BASIC    Collection Time: 02/08/22  5:11 AM   Result Value Ref Range    Sodium 138 136 - 145 mmol/L    Potassium 3.3 (L) 3.5 - 5.1 mmol/L    Chloride 104 98 - 107 mmol/L    CO2 28 21 - 32 mmol/L    Anion gap 6 (L) 7 - 16 mmol/L    Glucose 169 (H) 65 - 100 mg/dL    BUN 15 8 - 23 MG/DL    Creatinine 0.70 (L) 0.8 - 1.5 MG/DL    GFR est AA >60 >60 ml/min/1.73m2    GFR est non-AA >60 >60 ml/min/1.73m2    Calcium 8.3 8.3 - 10.4 MG/DL   GLUCOSE, POC    Collection Time: 02/08/22  7:38 AM   Result Value Ref Range    Glucose (POC) 162 (H) 65 - 100 mg/dL    Performed by RottaEl    COVID-19 RAPID TEST    Collection Time: 02/08/22 10:55 AM   Result Value Ref Range    Specimen source Nasopharyngeal      COVID-19 rapid test Not detected NOTD         Current Med List in Hospital:   Current Facility-Administered Medications   Medication Dose Route Frequency    divalproex (DEPAKOTE SPRINKLE) capsule 125 mg  125 mg Oral Q8H    polyethylene glycol (MIRALAX) packet 17 g  17 g Oral DAILY    lidocaine 4 % patch 1 Patch  1 Patch TransDERmal Q24H    acetaminophen (TYLENOL) tablet 650 mg  650 mg Oral Q8H    oxyCODONE IR (ROXICODONE) tablet 2.5 mg  2.5 mg Oral Q4H PRN    cyclobenzaprine (FLEXERIL) tablet 10 mg  10 mg Oral TID PRN    alcohol 62% (NOZIN) nasal  1 Ampule  1 Ampule Topical Q12H    sodium chloride (NS) flush 5-40 mL  5-40 mL IntraVENous PRN    naloxone (NARCAN) injection 0.2-0.4 mg  0.2-0.4 mg IntraVENous Q10MIN PRN    ondansetron (ZOFRAN) injection 4 mg  4 mg IntraVENous Q4H PRN    senna-docusate (PERICOLACE) 8.6-50 mg per tablet 2 Tablet  2 Tablet Oral DAILY    aspirin delayed-release tablet 325 mg  325 mg Oral DAILY    ferrous sulfate tablet 325 mg  1 Tablet Oral DAILY WITH BREAKFAST    insulin lispro (HUMALOG) injection   SubCUTAneous AC&HS    sodium chloride (NS) flush 5-40 mL  5-40 mL IntraVENous Q8H    sodium chloride (NS) flush 5-40 mL  5-40 mL IntraVENous PRN    pravastatin (PRAVACHOL) tablet 20 mg  20 mg Oral QHS    midodrine (PROAMATINE) tablet 10 mg  10 mg Oral TID WITH MEALS    sodium chloride (NS) flush 5-40 mL  5-40 mL IntraVENous PRN    docusate sodium (COLACE) capsule 100 mg  100 mg Oral BID PRN       No Known Allergies  Immunization History   Administered Date(s) Administered    TB Skin Test (PPD) Intradermal 02/03/2022       Recent Vital Data:  Patient Vitals for the past 24 hrs:   Temp Pulse Resp BP SpO2   02/08/22 0735 97.6 °F (36.4 °C) 78 18 131/68 95 %   02/08/22 0444 98.2 °F (36.8 °C) 70 18 122/62 92 %   02/08/22 0022 98.7 °F (37.1 °C) 87 18 117/62 93 %   02/07/22 2036 97.1 °F (36.2 °C) 87 18 (!) 140/59 95 %   02/07/22 1700 -- -- -- 132/68 --   02/07/22 1601 98.2 °F (36.8 °C) 84 18 122/65 96 %     Oxygen Therapy  O2 Sat (%): 95 % (02/08/22 0735)  Pulse via Oximetry: 65 beats per minute (02/04/22 1033)  O2 Device: None (Room air) (02/08/22 0720)  Skin Assessment: Clean, dry, & intact (02/05/22 0414)  Skin Protection for O2 Device: No (02/05/22 0414)  O2 Flow Rate (L/min): 1 l/min (02/05/22 0414)    Estimated body mass index is 24.2 kg/m² as calculated from the following:    Height as of this encounter: 5' 7\" (1.702 m). Weight as of this encounter: 70.1 kg (154 lb 8 oz). Intake/Output Summary (Last 24 hours) at 2/8/2022 1145  Last data filed at 2/8/2022 0720  Gross per 24 hour   Intake 1489 ml   Output 325 ml   Net 1164 ml         Physical Exam:  General:          Elderly, alert awake,, NAD  HEENT:           Head NCAT, PERRLA positive, MMM  Neck:                supple  CV:                    regular rhythm. No m/r/g. No jugular venous distension. Pacemaker present  Lungs:             CTA B  Abdomen: Bowel sounds present. Soft, nontender, nondistended. Extremities:     Left lower extremity with immobilizer  Skin:                No rashes and normal coloration. Warm and dry. Neuro: Following commands intermittently. Speaking. Moving all 4 extremities  Psych:             Cognitive impairment noted      Signed:  Viji Ball MD    Part of this note may have been written by using a voice dictation software. The note has been proof read but may still contain some grammatical/other typographical errors.

## 2022-02-08 NOTE — PROGRESS NOTES
ACUTE OT GOALS:  (Developed with and agreed upon by patient and/or caregiver.)  1. Patient will complete grooming ADL with min A.   2. Patient will complete functional transfers with MOD A X2 and adaptive equipment as needed. 3. Patient will feed self entire meal with MIN A.   4. Patient will follow commands for 75% of treatment session for increased participation in ADLs. 5. Patient will tolerate sitting EOB for 10 minutes while completing functional activity with fair+ sitting balance. OCCUPATIONAL THERAPY: Daily Note OT Treatment Day # 4    Emilio Sagastume is a 80 y.o. male   PRIMARY DIAGNOSIS: Closed displaced fracture of left femoral neck (HCC)  Sinus bradycardia [R00.1]  Closed displaced fracture of left femoral neck (HCC) [S72.002A]  Procedure(s) (LRB):  LEFT HIP HEMIARTHROPLASTY CHOICE ANES  ROOM 1105 (Left)  4 Days Post-Op  [place] / [place] / Product Type: Medicare /   ASSESSMENT:     REHAB RECOMMENDATIONS: CURRENT LEVEL OF FUNCTION:  (Most Recently Demonstrated)   Recommendation to date pending progress:  Setting:   Short-term Rehab  Equipment:    Rolling Walker Bathing:   Not tested  Dressing:   Not tested  Feeding/Grooming:   Set Up  Toileting:   Not tested  Functional Mobility:   Minimal Assistance x 2 X 2      ASSESSMENT:  Mr. Esvin Fuchs was supine in bed upon arrival. Pt completed bed mobility with mod A X 2. Pt completed functional transfer with min A X 2 using rolling walker. Continue POC. SUBJECTIVE:   Mr. Esvin Fuchs states, \"I cant do it. \"    SOCIAL HISTORY/LIVING ENVIRONMENT:   Home Environment: Skilled nursing facility  One/Two Story Residence: One story  Living Alone: No  Support Systems: Child(kyle) (Patient's daughter)    OBJECTIVE:     PAIN: VITAL SIGNS: LINES/DRAINS:   Pre Treatment: Pain Screen  Pain Scale 1: Numeric (0 - 10)  Pain Intensity 1: 3  Pain Location 1: Hip  Pain Intervention(s) 1: Repositioned5  Post Treatment: 5   [place] Catheter and IV  O2 Device: None (Room air)     ACTIVITIES OF DAILY LIVING: I Mod I S SBA CGA Min Mod Max Total NT Comments   BASIC ADLs:              Bathing/ Showering [] [] [] [] [] [] [] [] [] []    Toileting [] [] [] [] [] [] [] [] [] []    Dressing [] [] [] [] [] [] [] [] [] []    Feeding [] [] [] [] [] [] [] [] [] []    Grooming [] [] [] [] [] [] [] [] [] []    Personal Device Care [] [] [] [] [] [] [] [] [] []    Functional Mobility [] [] [] [] [] [] [] [] [] []    I=Independent, Mod I=Modified Independent, S=Supervision, SBA=Standby Assistance, CGA=Contact Guard Assistance,   Min=Minimal Assistance, Mod=Moderate Assistance, Max=Maximal Assistance, Total=Total Assistance, NT=Not Tested    MOBILITY: I Mod I S SBA CGA Min Mod Max Total  NT x2 Comments:   Supine to sit [] [] [] [] [] [] [x] [] [] [] [x]    Sit to supine [] [] [] [] [] [] [] [] [] [] []    Sit to stand [] [] [] [] [] [x] [] [] [] [] [x]    Bed to chair [] [] [] [] [] [x] [] [] [] [x] [x]    I=Independent, Mod I=Modified Independent, S=Supervision, SBA=Standby Assistance, CGA=Contact Guard Assistance,   Min=Minimal Assistance, Mod=Moderate Assistance, Max=Maximal Assistance, Total=Total Assistance, NT=Not Tested    BALANCE: Good Fair+ Fair Fair- Poor NT Comments   Sitting Static [x] [] [] [] [] []    Sitting Dynamic [x] [] [] [] [] []              Standing Static [] [] [] [] [x] []    Standing Dynamic [] [] [] [] [x] []      PLAN:   FREQUENCY/DURATION: OT Plan of Care: 4 times/week for duration of hospital stay or until stated goals are met, whichever comes first.    TREATMENT:   TREATMENT:   ( $$ Neuromuscular Re-Education: 23-37 mins   )  Neuromuscular Re-education (25 Minutes): Neuromuscular Re-education included Balance Training, Functional mobility with facilitation, Postural training, Sitting balance training and Standing balance training to improve Coordination, Functional Mobility and Postural Control.     TREATMENT GRID:  N/A    AFTER TREATMENT POSITION/PRECAUTIONS:  Alarm Activated, Chair, Needs within reach, RN notified and sitter    INTERDISCIPLINARY COLLABORATION:  RN/PCT, PT/PTA and OT/PEREZ    TOTAL TREATMENT DURATION:  OT Patient Time In/Time Out  Time In: 1516  Time Out: [address]

## 2022-02-08 NOTE — PROGRESS NOTES
CM met with pt and his daughter to complete DCP. Both agreeable to STR at [address] [facility]. CM received call back from admitting, Yobany Renee, to finalize admission. CM faxed requested documents to Yobany Renee including Covid screen, PPD, DC orders/summary. CM set up transport with [facility] ambulance at 1500. Daughter is at bedside and aware. DNR signed and placed in packet. DCP: [facility], room 213 w.

## 2022-02-08 NOTE — PROGRESS NOTES
ACUTE PHYSICAL THERAPY GOALS:  (Developed with and agreed upon by patient and/or caregiver.)  1. Mr. Carmen Lugo will perform supine to sit and sit to supine with min assist of 1 in 7 days. 2.  Mr. Carmen Lugo will perform sit to stand and bed to chair with max of 1 in 7 days. 3.  Mr. Carmen Lugo will perform gait with rolling walker 25 ft with mod assist of 2 in 7 days. 4.  Mr. Carmen Lugo will perform active ROM x 10 reps to left leg in 7 days. PHYSICAL THERAPY: Daily Note and AM Treatment Day # 4     WBAT L LE; Hip Precautions  L UE in sling s/p pacemaker placement    Maria De Jesus Ho is a 80 y.o. male   PRIMARY DIAGNOSIS: Closed displaced fracture of left femoral neck (HCC)  Sinus bradycardia [R00.1]  Closed displaced fracture of left femoral neck (HCC) [S72.002A]  Procedure(s) (LRB):  LEFT HIP HEMIARTHROPLASTY CHOICE ANES  ROOM 1105 (Left)  4 Days Post-Op    ASSESSMENT:     REHAB RECOMMENDATIONS: CURRENT LEVEL OF FUNCTION:  (Most Recently Demonstrated)   Recommendation to date pending progress:  Setting:   Short-term Rehab  Equipment:    To Be Determined Bed Mobility:   Moderate Assistance x 2  Sit to Stand:   Minimal Assistance x 2  Transfers:   Moderate Assistance x 2  Gait/Mobility:   Minimal Assistance x 2     ASSESSMENT:  Mr. Carmen Lugo is making slow, steady progress toward goals. He continues to be confused and require extra cueing for focus on tasks. Mobility with min-mod Ax2 and constant cueing for sequencing and safety. SPT to the recliner with a few small shuffled steps. Better mobility and command following this session.         SUBJECTIVE:   Mr. Carmen Lugo states, \"I do need to get up\"    SOCIAL HISTORY/ LIVING ENVIRONMENT: Dementia, ambulatory  Home Environment: Skilled nursing facility  One/Two Story Residence: One story  Living Alone: No  Support Systems: Child(kyle) (Patient's daughter)  OBJECTIVE:     PAIN: VITAL SIGNS: LINES/DRAINS:   Pre Treatment: Pain Screen  Pain Scale 1: Numeric (0 - 10)  Pain Intensity 1: 0  Post Treatment: FLACC 0 at rest      O2 Device: None (Room air)     MOBILITY: I Mod I S SBA CGA Min Mod Max Total  NT x2 Comments:   Bed Mobility    Rolling [] [] [] [] [] [] [x] [] [] [x] []    Supine to Sit [] [] [] [] [] [] [x] [] [] [] [x]    Scooting [] [] [] [] [] [x] [x] [] [] [] [x]    Sit to Supine [] [] [] [] [] [] [] [] [] [x] []    Transfers    Sit to Stand [] [] [] [] [] [x] [x] [] [] [] [x]    Bed to Chair [] [] [] [] [] [x] [x] [] [] [] [x]    Stand to Sit [] [] [] [] [] [x] [x] [] [] [] [x]    I=Independent, Mod I=Modified Independent, S=Supervision, SBA=Standby Assistance, CGA=Contact Guard Assistance,   Min=Minimal Assistance, Mod=Moderate Assistance, Max=Maximal Assistance, Total=Total Assistance, NT=Not Tested    BALANCE: Good Fair+ Fair Fair- Poor NT Comments   Sitting Static [] [] [x] [x] [] []    Sitting Dynamic [] [] [x] [x] [] []              Standing Static [] [] [] [x] [] []    Standing Dynamic [] [] [] [x] [] []      GAIT: I Mod I S SBA CGA Min Mod Max Total  NT x2 Comments:   Level of Assistance [] [] [] [] [] [] [] [] [] [x] []    Distance SPT    DME Rolling Walker and Gait Belt    Gait Quality Stand pivot transfer only    Weightbearing  Status WBAT, L LE with posterior hip precautions     I=Independent, Mod I=Modified Independent, S=Supervision, SBA=Standby Assistance, CGA=Contact Guard Assistance,   Min=Minimal Assistance, Mod=Moderate Assistance, Max=Maximal Assistance, Total=Total Assistance, NT=Not Tested    PLAN:   FREQUENCY/DURATION: PT Plan of Care: BID for duration of hospital stay or until stated goals are met, whichever comes first.  TREATMENT:     TREATMENT:   ($$ Therapeutic Activity: 23-37 mins    )  Therapeutic Activity (23 Minutes): Therapeutic activity included Rolling, Supine to Sit, Scooting, Transfer Training, Sitting balance  and Standing balance to improve functional Mobility, Strength and Activity tolerance.   At this time, patient is appropriate for Co-treatment with physical therapy due to patient's decreased overall endurance/tolerance levels, as well as need for high level skilled assistance to complete functional transfers/mobility and functional tasks. Noble Romberg is appropriate for a multidisciplinary co-treatment of PT and OT to address the goals of both disciplines.      TREATMENT GRID:  N/A    AFTER TREATMENT POSITION/PRECAUTIONS:  Alarm Activated, Chair, Needs within reach, RN notified and sitter    INTERDISCIPLINARY COLLABORATION:  RN/PCT, PT/PTA and OT/PEREZ    TOTAL TREATMENT DURATION:  PT Patient Time In/Time Out  Time In: 9225  Time Out: [address], PTA

## 2022-02-08 NOTE — PROGRESS NOTES
CM informed by Dr Haresh Fields that pt is medically stable to discharge to rehab this day. CM LVOM at [facility] with the above information. Awaiting verification of acceptance this day.

## 2022-02-08 NOTE — PROGRESS NOTES
PT note: Attempted to see patient for PM treatment. Patient and family present and reporting patient has just gotten back to bed. Patient remains confused and family requesting defer treatment at this time due to upcoming d/c. Will check back on patient at a later date/time as schedule allows and is appropriate.       Emilee Blankenship, PTA

## 2022-02-08 NOTE — PROGRESS NOTES
Problem: Pressure Injury - Risk of  Goal: *Prevention of pressure injury  Description: Document Monroe Scale and appropriate interventions in the flowsheet.   Outcome: Progressing Towards Goal  Note: Pressure Injury Interventions:  Sensory Interventions: Minimize linen layers,Assess changes in LOC,Keep linens dry and wrinkle-free    Moisture Interventions: Absorbent underpads,Minimize layers    Activity Interventions: Increase time out of bed,Pressure redistribution bed/mattress(bed type)    Mobility Interventions: Pressure redistribution bed/mattress (bed type)    Nutrition Interventions: Document food/fluid/supplement intake,Offer support with meals,snacks and hydration    Friction and Shear Interventions: Foam dressings/transparent film/skin sealants,Minimize layers                Problem: Patient Education: Go to Patient Education Activity  Goal: Patient/Family Education  Outcome: Progressing Towards Goal     Problem: Hip Fracture:Day of Admission Pre-op  Goal: Respiratory  Outcome: Progressing Towards Goal     Problem: Hip Fracture:Day of Admission Pre-op  Goal: *Hemodynamically stable  Outcome: Progressing Towards Goal

## 2022-03-18 PROBLEM — E11.9 DIABETES MELLITUS TYPE 2, CONTROLLED (HCC): Status: ACTIVE | Noted: 2022-02-02

## 2022-03-18 PROBLEM — F01.50 VASCULAR DEMENTIA (HCC): Status: ACTIVE | Noted: 2022-02-02

## 2022-03-19 PROBLEM — S72.002A CLOSED DISPLACED FRACTURE OF LEFT FEMORAL NECK (HCC): Status: ACTIVE | Noted: 2022-02-02

## 2022-04-01 ENCOUNTER — HOSPITAL ENCOUNTER (OUTPATIENT)
Dept: GENERAL RADIOLOGY | Age: 87
Discharge: HOME | End: 2022-04-01

## 2022-04-01 DIAGNOSIS — R06.02 SOB (SHORTNESS OF BREATH): ICD-10-CM

## 2022-05-09 PROBLEM — R00.1 SYMPTOMATIC BRADYCARDIA: Status: ACTIVE | Noted: 2022-02-02

## 2022-06-04 ENCOUNTER — HOSPITAL ENCOUNTER (INPATIENT)
Age: 87
LOS: 5 days | Discharge: HOME | DRG: 689 | End: 2022-06-09
Attending: EMERGENCY MEDICINE | Admitting: HOSPITALIST
Payer: MEDICARE

## 2022-06-04 ENCOUNTER — APPOINTMENT (OUTPATIENT)
Dept: GENERAL RADIOLOGY | Age: 87
DRG: 689 | End: 2022-06-04
Payer: MEDICARE

## 2022-06-04 ENCOUNTER — APPOINTMENT (OUTPATIENT)
Dept: CT IMAGING | Age: 87
DRG: 689 | End: 2022-06-04
Payer: MEDICARE

## 2022-06-04 DIAGNOSIS — R41.0 DELIRIUM: Primary | ICD-10-CM

## 2022-06-04 DIAGNOSIS — N30.00 ACUTE CYSTITIS WITHOUT HEMATURIA: ICD-10-CM

## 2022-06-04 DIAGNOSIS — F03.90 DEMENTIA WITHOUT BEHAVIORAL DISTURBANCE, UNSPECIFIED DEMENTIA TYPE: ICD-10-CM

## 2022-06-04 PROBLEM — G93.41 ACUTE METABOLIC ENCEPHALOPATHY: Status: ACTIVE | Noted: 2022-06-04

## 2022-06-04 PROBLEM — N39.0 COMPLICATED UTI (URINARY TRACT INFECTION): Status: ACTIVE | Noted: 2022-06-04

## 2022-06-04 PROBLEM — E11.9 DIABETES MELLITUS TYPE 2, CONTROLLED (HCC): Status: ACTIVE | Noted: 2022-02-02

## 2022-06-04 PROBLEM — F01.50 VASCULAR DEMENTIA (HCC): Status: ACTIVE | Noted: 2022-02-02

## 2022-06-04 LAB
ALBUMIN SERPL-MCNC: 3.2 G/DL (ref 3.2–4.6)
ALBUMIN/GLOB SERPL: 0.9 {RATIO} (ref 1.2–3.5)
ALP SERPL-CCNC: 115 U/L (ref 50–136)
ALT SERPL-CCNC: 71 U/L (ref 12–65)
ANION GAP SERPL CALC-SCNC: 4 MMOL/L (ref 7–16)
APPEARANCE UR: ABNORMAL
AST SERPL-CCNC: 71 U/L (ref 15–37)
BACTERIA URNS QL MICRO: ABNORMAL /HPF
BASOPHILS # BLD: 0 K/UL (ref 0–0.2)
BASOPHILS NFR BLD: 0 % (ref 0–2)
BILIRUB SERPL-MCNC: 1 MG/DL (ref 0.2–1.1)
BILIRUB UR QL: NEGATIVE
BILIRUB UR QL: NEGATIVE
BUN SERPL-MCNC: 24 MG/DL (ref 8–23)
CALCIUM SERPL-MCNC: 8.9 MG/DL (ref 8.3–10.4)
CASTS URNS QL MICRO: 0 /LPF
CHLORIDE SERPL-SCNC: 103 MMOL/L (ref 98–107)
CO2 SERPL-SCNC: 32 MMOL/L (ref 21–32)
COLOR UR: YELLOW
CREAT SERPL-MCNC: 1 MG/DL (ref 0.8–1.5)
CRYSTALS URNS QL MICRO: 0 /LPF
DIFFERENTIAL METHOD BLD: ABNORMAL
EKG ATRIAL RATE: 0 BPM
EKG DIAGNOSIS: NORMAL
EKG Q-T INTERVAL: 319 MS
EKG QRS DURATION: 102 MS
EKG QTC CALCULATION (BAZETT): 354 MS
EKG R AXIS: 70 DEGREES
EKG T AXIS: 82 DEGREES
EKG VENTRICULAR RATE: 79 BPM
EOSINOPHIL # BLD: 0 K/UL (ref 0–0.8)
EOSINOPHIL NFR BLD: 0 % (ref 0.5–7.8)
EPI CELLS #/AREA URNS HPF: 0 /HPF
ERYTHROCYTE [DISTWIDTH] IN BLOOD BY AUTOMATED COUNT: 12.3 % (ref 11.9–14.6)
GLOBULIN SER CALC-MCNC: 3.6 G/DL (ref 2.3–3.5)
GLUCOSE BLD STRIP.AUTO-MCNC: 190 MG/DL (ref 65–100)
GLUCOSE BLD STRIP.AUTO-MCNC: 95 MG/DL (ref 65–100)
GLUCOSE SERPL-MCNC: 184 MG/DL (ref 65–100)
GLUCOSE UR QL STRIP.AUTO: NEGATIVE MG/DL
GLUCOSE UR STRIP.AUTO-MCNC: NEGATIVE MG/DL
HCT VFR BLD AUTO: 40.8 % (ref 41.1–50.3)
HGB BLD-MCNC: 13.1 G/DL (ref 13.6–17.2)
HGB UR QL STRIP: ABNORMAL
IMM GRANULOCYTES # BLD AUTO: 0.1 K/UL (ref 0–0.5)
IMM GRANULOCYTES NFR BLD AUTO: 1 % (ref 0–5)
KETONES UR QL STRIP.AUTO: NEGATIVE MG/DL
KETONES UR-MCNC: NEGATIVE MG/DL
LACTATE SERPL-SCNC: 1.4 MMOL/L (ref 0.4–2)
LEUKOCYTE ESTERASE UR QL STRIP.AUTO: ABNORMAL
LEUKOCYTE ESTERASE UR QL STRIP: ABNORMAL
LYMPHOCYTES # BLD: 0.3 K/UL (ref 0.5–4.6)
LYMPHOCYTES NFR BLD: 5 % (ref 13–44)
MCH RBC QN AUTO: 28.9 PG (ref 26.1–32.9)
MCHC RBC AUTO-ENTMCNC: 32.1 G/DL (ref 31.4–35)
MCV RBC AUTO: 90.1 FL (ref 79.6–97.8)
MONOCYTES # BLD: 0.9 K/UL (ref 0.1–1.3)
MONOCYTES NFR BLD: 12 % (ref 4–12)
MUCOUS THREADS URNS QL MICRO: 0 /LPF
NEUTS SEG # BLD: 5.8 K/UL (ref 1.7–8.2)
NEUTS SEG NFR BLD: 82 % (ref 43–78)
NITRITE UR QL STRIP.AUTO: NEGATIVE
NITRITE UR QL: NEGATIVE
NRBC # BLD: 0 K/UL (ref 0–0.2)
OTHER OBSERVATIONS: ABNORMAL
PH UR STRIP: 6.5 [PH] (ref 5–9)
PH UR: 7 [PH] (ref 5–9)
PLATELET # BLD AUTO: 205 K/UL (ref 150–450)
PMV BLD AUTO: 9.9 FL (ref 9.4–12.3)
POTASSIUM SERPL-SCNC: 4.4 MMOL/L (ref 3.5–5.1)
PROT SERPL-MCNC: 6.8 G/DL (ref 6.3–8.2)
PROT UR QL: 100 MG/DL
PROT UR STRIP-MCNC: 30 MG/DL
RBC # BLD AUTO: 4.53 M/UL (ref 4.23–5.6)
RBC # UR STRIP: ABNORMAL /UL
RBC #/AREA URNS HPF: ABNORMAL /HPF
SERVICE CMNT-IMP: ABNORMAL
SERVICE CMNT-IMP: ABNORMAL
SERVICE CMNT-IMP: NORMAL
SODIUM SERPL-SCNC: 139 MMOL/L (ref 136–145)
SP GR UR REFRACTOMETRY: 1.02 (ref 1–1.02)
SP GR UR: 1.02 (ref 1–1.02)
TSH W FREE THYROID IF ABNORMAL: 2.62 UIU/ML (ref 0.36–3.74)
UROBILINOGEN UR QL STRIP.AUTO: 1 EU/DL (ref 0.2–1)
UROBILINOGEN UR QL: 2 EU/DL (ref 0.2–1)
WBC # BLD AUTO: 7.1 K/UL (ref 4.3–11.1)
WBC URNS QL MICRO: >100 /HPF

## 2022-06-04 PROCEDURE — 2580000003 HC RX 258: Performed by: FAMILY MEDICINE

## 2022-06-04 PROCEDURE — 6370000000 HC RX 637 (ALT 250 FOR IP): Performed by: FAMILY MEDICINE

## 2022-06-04 PROCEDURE — 84443 ASSAY THYROID STIM HORMONE: CPT

## 2022-06-04 PROCEDURE — 70450 CT HEAD/BRAIN W/O DYE: CPT

## 2022-06-04 PROCEDURE — 83605 ASSAY OF LACTIC ACID: CPT

## 2022-06-04 PROCEDURE — 81015 MICROSCOPIC EXAM OF URINE: CPT

## 2022-06-04 PROCEDURE — 87088 URINE BACTERIA CULTURE: CPT

## 2022-06-04 PROCEDURE — 6360000002 HC RX W HCPCS: Performed by: EMERGENCY MEDICINE

## 2022-06-04 PROCEDURE — 94761 N-INVAS EAR/PLS OXIMETRY MLT: CPT

## 2022-06-04 PROCEDURE — 81003 URINALYSIS AUTO W/O SCOPE: CPT

## 2022-06-04 PROCEDURE — 87186 SC STD MICRODIL/AGAR DIL: CPT

## 2022-06-04 PROCEDURE — 82962 GLUCOSE BLOOD TEST: CPT

## 2022-06-04 PROCEDURE — 93005 ELECTROCARDIOGRAM TRACING: CPT | Performed by: EMERGENCY MEDICINE

## 2022-06-04 PROCEDURE — 99285 EMERGENCY DEPT VISIT HI MDM: CPT

## 2022-06-04 PROCEDURE — 87086 URINE CULTURE/COLONY COUNT: CPT

## 2022-06-04 PROCEDURE — 1100000000 HC RM PRIVATE

## 2022-06-04 PROCEDURE — 2580000003 HC RX 258: Performed by: EMERGENCY MEDICINE

## 2022-06-04 PROCEDURE — 2500000003 HC RX 250 WO HCPCS: Performed by: FAMILY MEDICINE

## 2022-06-04 PROCEDURE — 87040 BLOOD CULTURE FOR BACTERIA: CPT

## 2022-06-04 PROCEDURE — 6360000002 HC RX W HCPCS: Performed by: FAMILY MEDICINE

## 2022-06-04 PROCEDURE — 80053 COMPREHEN METABOLIC PANEL: CPT

## 2022-06-04 PROCEDURE — 85025 COMPLETE CBC W/AUTO DIFF WBC: CPT

## 2022-06-04 PROCEDURE — 96365 THER/PROPH/DIAG IV INF INIT: CPT

## 2022-06-04 PROCEDURE — 71045 X-RAY EXAM CHEST 1 VIEW: CPT

## 2022-06-04 RX ORDER — LANOLIN ALCOHOL/MO/W.PET/CERES
100 CREAM (GRAM) TOPICAL DAILY
Status: DISCONTINUED | OUTPATIENT
Start: 2022-06-05 | End: 2022-06-09 | Stop reason: HOSPADM

## 2022-06-04 RX ORDER — INSULIN GLARGINE 100 [IU]/ML
10 INJECTION, SOLUTION SUBCUTANEOUS NIGHTLY
COMMUNITY

## 2022-06-04 RX ORDER — HEPARIN SODIUM 5000 [USP'U]/ML
5000 INJECTION, SOLUTION INTRAVENOUS; SUBCUTANEOUS EVERY 8 HOURS SCHEDULED
Status: DISCONTINUED | OUTPATIENT
Start: 2022-06-04 | End: 2022-06-09 | Stop reason: HOSPADM

## 2022-06-04 RX ORDER — SODIUM CHLORIDE 0.9 % (FLUSH) 0.9 %
5-40 SYRINGE (ML) INJECTION PRN
Status: DISCONTINUED | OUTPATIENT
Start: 2022-06-04 | End: 2022-06-09 | Stop reason: HOSPADM

## 2022-06-04 RX ORDER — INSULIN LISPRO 100 [IU]/ML
0-4 INJECTION, SOLUTION INTRAVENOUS; SUBCUTANEOUS
Status: DISCONTINUED | OUTPATIENT
Start: 2022-06-04 | End: 2022-06-09 | Stop reason: HOSPADM

## 2022-06-04 RX ORDER — SENNA AND DOCUSATE SODIUM 50; 8.6 MG/1; MG/1
2 TABLET, FILM COATED ORAL DAILY
Status: DISCONTINUED | OUTPATIENT
Start: 2022-06-05 | End: 2022-06-09 | Stop reason: HOSPADM

## 2022-06-04 RX ORDER — SODIUM CHLORIDE 9 MG/ML
INJECTION, SOLUTION INTRAVENOUS CONTINUOUS
Status: DISCONTINUED | OUTPATIENT
Start: 2022-06-04 | End: 2022-06-09

## 2022-06-04 RX ORDER — INSULIN LISPRO 100 [IU]/ML
0-4 INJECTION, SOLUTION INTRAVENOUS; SUBCUTANEOUS NIGHTLY
Status: DISCONTINUED | OUTPATIENT
Start: 2022-06-04 | End: 2022-06-09 | Stop reason: HOSPADM

## 2022-06-04 RX ORDER — POLYETHYLENE GLYCOL 3350 17 G/17G
17 POWDER, FOR SOLUTION ORAL DAILY PRN
COMMUNITY

## 2022-06-04 RX ORDER — ACETAMINOPHEN 325 MG/1
650 TABLET ORAL EVERY 6 HOURS PRN
Status: DISCONTINUED | OUTPATIENT
Start: 2022-06-04 | End: 2022-06-09 | Stop reason: HOSPADM

## 2022-06-04 RX ORDER — HALOPERIDOL 0.5 MG/1
0.5 TABLET ORAL NIGHTLY
COMMUNITY

## 2022-06-04 RX ORDER — ONDANSETRON 4 MG/1
4 TABLET, ORALLY DISINTEGRATING ORAL EVERY 8 HOURS PRN
Status: DISCONTINUED | OUTPATIENT
Start: 2022-06-04 | End: 2022-06-09 | Stop reason: HOSPADM

## 2022-06-04 RX ORDER — SODIUM CHLORIDE, SODIUM LACTATE, POTASSIUM CHLORIDE, AND CALCIUM CHLORIDE .6; .31; .03; .02 G/100ML; G/100ML; G/100ML; G/100ML
500 INJECTION, SOLUTION INTRAVENOUS
Status: COMPLETED | OUTPATIENT
Start: 2022-06-04 | End: 2022-06-04

## 2022-06-04 RX ORDER — ACETAMINOPHEN 650 MG/1
650 SUPPOSITORY RECTAL EVERY 6 HOURS PRN
Status: DISCONTINUED | OUTPATIENT
Start: 2022-06-04 | End: 2022-06-09 | Stop reason: HOSPADM

## 2022-06-04 RX ORDER — SODIUM CHLORIDE 0.9 % (FLUSH) 0.9 %
5-40 SYRINGE (ML) INJECTION EVERY 12 HOURS SCHEDULED
Status: DISCONTINUED | OUTPATIENT
Start: 2022-06-04 | End: 2022-06-09 | Stop reason: HOSPADM

## 2022-06-04 RX ORDER — MEMANTINE HYDROCHLORIDE 5 MG/1
5 TABLET ORAL DAILY
COMMUNITY

## 2022-06-04 RX ORDER — SODIUM CHLORIDE 0.9 % (FLUSH) 0.9 %
3 SYRINGE (ML) INJECTION EVERY 8 HOURS
Status: DISCONTINUED | OUTPATIENT
Start: 2022-06-04 | End: 2022-06-09 | Stop reason: HOSPADM

## 2022-06-04 RX ORDER — ASCORBIC ACID 500 MG
500 TABLET ORAL DAILY
Status: DISCONTINUED | OUTPATIENT
Start: 2022-06-05 | End: 2022-06-09 | Stop reason: HOSPADM

## 2022-06-04 RX ORDER — FERROUS SULFATE 325(65) MG
325 TABLET ORAL
Status: DISCONTINUED | OUTPATIENT
Start: 2022-06-05 | End: 2022-06-09 | Stop reason: HOSPADM

## 2022-06-04 RX ORDER — ONDANSETRON 2 MG/ML
4 INJECTION INTRAMUSCULAR; INTRAVENOUS EVERY 6 HOURS PRN
Status: DISCONTINUED | OUTPATIENT
Start: 2022-06-04 | End: 2022-06-09 | Stop reason: HOSPADM

## 2022-06-04 RX ORDER — DIVALPROEX SODIUM 125 MG/1
125 CAPSULE, COATED PELLETS ORAL EVERY 8 HOURS
Status: DISCONTINUED | OUTPATIENT
Start: 2022-06-04 | End: 2022-06-09 | Stop reason: HOSPADM

## 2022-06-04 RX ORDER — GLIPIZIDE 10 MG/1
10 TABLET, FILM COATED, EXTENDED RELEASE ORAL DAILY
COMMUNITY

## 2022-06-04 RX ORDER — SODIUM CHLORIDE 9 MG/ML
INJECTION, SOLUTION INTRAVENOUS PRN
Status: DISCONTINUED | OUTPATIENT
Start: 2022-06-04 | End: 2022-06-09 | Stop reason: HOSPADM

## 2022-06-04 RX ADMIN — SODIUM CHLORIDE, POTASSIUM CHLORIDE, SODIUM LACTATE AND CALCIUM CHLORIDE 500 ML: 600; 310; 30; 20 INJECTION, SOLUTION INTRAVENOUS at 13:44

## 2022-06-04 RX ADMIN — CEFTRIAXONE 1000 MG: 1 INJECTION, POWDER, FOR SOLUTION INTRAMUSCULAR; INTRAVENOUS at 14:47

## 2022-06-04 RX ADMIN — HEPARIN SODIUM 5000 UNITS: 5000 INJECTION INTRAVENOUS; SUBCUTANEOUS at 17:03

## 2022-06-04 RX ADMIN — TUBERCULIN PURIFIED PROTEIN DERIVATIVE 5 UNITS: 5 INJECTION, SOLUTION INTRADERMAL at 21:37

## 2022-06-04 RX ADMIN — SODIUM CHLORIDE, PRESERVATIVE FREE 10 ML: 5 INJECTION INTRAVENOUS at 21:33

## 2022-06-04 RX ADMIN — SODIUM CHLORIDE, PRESERVATIVE FREE 3 ML: 5 INJECTION INTRAVENOUS at 21:33

## 2022-06-04 RX ADMIN — SODIUM CHLORIDE: 9 INJECTION, SOLUTION INTRAVENOUS at 17:03

## 2022-06-04 RX ADMIN — DIVALPROEX SODIUM 125 MG: 125 CAPSULE ORAL at 17:03

## 2022-06-04 ASSESSMENT — PAIN SCALES - PAIN ASSESSMENT IN ADVANCED DEMENTIA (PAINAD)
BREATHING: 0
FACIALEXPRESSION: 0
NEGVOCALIZATION: 0
CONSOLABILITY: 0
TOTALSCORE: 0
BODYLANGUAGE: 0

## 2022-06-04 ASSESSMENT — PAIN SCALES - GENERAL
PAINLEVEL_OUTOF10: 0
PAINLEVEL_OUTOF10: 0

## 2022-06-04 NOTE — ED PROVIDER NOTES
[employer] Emergency Department Provider Note                   PCP:                SALLIE Burgess NP               Age: 80 y.o. Sex: male       ICD-10-CM    1. Delirium  R41.0    2. Acute cystitis without hematuria  N30.00    3. Dementia without behavioral disturbance, unspecified dementia type ([facility] [address]  F03.90        DISPOSITION Decision To Admit 06/04/2022 02:44:02 PM       New Prescriptions    No medications on file       Orders Placed This Encounter   Procedures    Culture, Urine    Culture, Blood 1    Culture, Blood 1    XR CHEST PORTABLE    CT Head W/O Contrast    CBC with Auto Differential    CMP    TSH with Reflex    Lactic Acid Now and in 2 Hours    Urinalysis w rflx microscopic    Urinalysis, Micro    POCT Urine Dipstick    Pulse Oximetry    Straight cath    POCT Glucose    POCT Glucose    POCT Urinalysis no Micro    EKG 12 Lead    Saline lock IV        MDM  Number of Diagnoses or Management Options  Diagnosis management comments: Labs urine and CT scan imaging of the brain will be obtained. Evaluate for infectious etiology. Dehydrated clinically on IV hydration will be provided. 2:43 PM  CT scan unremarkable and labs otherwise okay, however patient does have a significant UTI. Will admit for treatment of UTI and delirium and monitoring of mental status. Patient is not appropriate for discharge back to assisted living and will not even open his mouth in order to take oral medications.        Amount and/or Complexity of Data Reviewed  Clinical lab tests: ordered and reviewed  Tests in the radiology section of CPT®: ordered and reviewed  Decide to obtain previous medical records or to obtain history from someone other than the patient: yes  Obtain history from someone other than the patient: yes  Review and summarize past medical records: yes    Risk of Complications, Morbidity, and/or Mortality  Presenting problems: moderate  Diagnostic procedures: low  Management options: nato Schroeder is a 80 y.o. male who presents to the Emergency Department with chief complaint of    Chief Complaint   Patient presents with    Fatigue       35-year-old male with history of dementia presents from assisted living with declining mental status and declining ambulatory status over the last 3 to 5 days. In May he was able to walk without a walker and then has progressively declined. Recently his daughter who arrives now and provides history states he has not been opening his eyes and is significantly declined over the last 3 to 5 days. She reports he has been eating well but she does not know how well he has been drinking. She is concerned about a UTI but patient has not had a UTI in the past.  There has been no trauma that she knows of. Patient is unable to provide history but will tell me his name. All other systems reviewed and are negative. Review of Systems   Unable to perform ROS: Dementia       Past Medical History:   Diagnosis Date    Diabetes ([facility] [address]     Vascular dementia ([facility] [address]         No past surgical history on file. Family History   Problem Relation Age of Onset    No Known Problems Mother     No Known Problems Father            Social Connections:     Frequency of Communication with Friends and Family: Not on file    Frequency of Social Gatherings with Friends and Family: Not on file    Attends Faith Services: Not on file    Active Member of Clubs or Organizations: Not on file    Attends Club or Organization Meetings: Not on file    Marital Status: Not on file        No Known Allergies     Vitals signs and nursing note reviewed. Patient Vitals for the past 4 hrs:   Temp Pulse Resp BP SpO2   06/04/22 1251 98.6 °F (37 °C) 73 16 103/74 98 %          Physical Exam  Vitals and nursing note reviewed. Constitutional:       General: He is not in acute distress. Appearance: Normal appearance. He is ill-appearing.  He is not toxic-appearing. Comments: Eyes are closed but he is alert and oriented to person only, he does follow some commands briefly. HENT:      Head: Normocephalic and atraumatic. Nose: Nose normal.      Mouth/Throat:      Mouth: Mucous membranes are dry. Eyes:      Conjunctiva/sclera: Conjunctivae normal.      Pupils: Pupils are equal, round, and reactive to light. Cardiovascular:      Rate and Rhythm: Normal rate and regular rhythm. Pulses: Normal pulses. Heart sounds: Normal heart sounds. Pulmonary:      Effort: Pulmonary effort is normal.      Breath sounds: Normal breath sounds. Abdominal:      General: There is no distension. Palpations: Abdomen is soft. Tenderness: There is no abdominal tenderness. There is no guarding or rebound. Musculoskeletal:         General: Normal range of motion. Right lower leg: No edema. Left lower leg: No edema. Skin:     General: Skin is warm and dry. Findings: No erythema or rash. Comments: Reduced skin turgor on abdomen   Neurological:      Mental Status: He is oriented to person, place, and time. Cranial Nerves: Cranial nerve deficit:  Patient is not cooperative with cranial nerve exam face C is appears symmetric. Sensory: No sensory deficit. Motor: No weakness ( No unilateral or one-sided weakness appreciated,, patient does move all extremities to painful stimuli but his knees are flexed and he will not straighten them or allow them to be straightened).       Coordination: Abnormal coordination:  Patient is not cooperative with cerebellar exam.          Procedures    Labs Reviewed   CBC WITH AUTO DIFFERENTIAL - Abnormal; Notable for the following components:       Result Value    Hemoglobin 13.1 (*)     Hematocrit 40.8 (*)     Seg Neutrophils 82 (*)     Lymphocytes 5 (*)     Eosinophils % 0 (*)     Absolute Lymph # 0.3 (*)     All other components within normal limits   COMPREHENSIVE METABOLIC PANEL - Abnormal; Notable for the following components:    Anion Gap 4 (*)     Glucose 184 (*)     BUN 24 (*)     ALT 71 (*)     AST 71 (*)     Globulin 3.6 (*)     Albumin/Globulin Ratio 0.9 (*)     All other components within normal limits   URINALYSIS - Abnormal; Notable for the following components:    Protein, UA 30 (*)     Blood, Urine MODERATE (*)     Leukocyte Esterase, Urine MODERATE (*)     All other components within normal limits   URINALYSIS, MICRO - Abnormal; Notable for the following components:    BACTERIA, URINE 4+ (*)     All other components within normal limits   POCT GLUCOSE - Abnormal; Notable for the following components:    POC Glucose 190 (*)     All other components within normal limits   POCT URINALYSIS DIPSTICK - Abnormal; Notable for the following components:    Specific Gravity, Urine, POC 1.025 (*)     Protein, Urine,  (*)     Blood, UA POC LARGE (*)     URINE UROBILINOGEN POC 2.0 (*)     Leukocyte Est, UA POC LARGE (*)     All other components within normal limits   CULTURE, BLOOD 1   CULTURE, BLOOD 1   CULTURE, URINE   TSH WITH REFLEX   LACTIC ACID   LACTIC ACID   POCT GLUCOSE        CT Head W/O Contrast   Final Result   Chronic appearing changes without acute intracranial abnormality. XR CHEST PORTABLE   Final Result               Tammy Coma Scale  Eye Opening: Spontaneous  Best Verbal Response: Confused  Best Motor Response: Localizes pain  Tammy Coma Scale Score: 13                     Voice dictation software was used during the making of this note. This software is not perfect and grammatical and other typographical errors may be present. This note has not been completely proofread for errors.         Joana Chiu MD  06/04/22 3288

## 2022-06-04 NOTE — ED TRIAGE NOTES
Pt arrives via [facility] from [facility] in [facility]. Per EMS facility reports pt has been altered over the past 3 days. Reports he normally ambulates and over the past x3 days pt has not been able to ambulate. Pt has severe dementia and is not oriented at baseline.

## 2022-06-04 NOTE — ACP (ADVANCE CARE PLANNING)
[facility] Hospitalist Service  At the heart of better care     Advance Care Planning   Admit Date:  2022 12:46 PM   Name:  Karen Barber   Age:  80 y.o. Sex:  male  :  1935   MRN:  [id number]   Room:  [place] [address]    Karen Barber is able to make his own decisions:   No    If pt unable to make decisions, POA/surrogate decision maker:  Daughter    Other members present in the meeting:   Daughter    Patient / surrogate decision-maker directed:  DNR/DNI    Patient or surrogate consented to discussion of the current conditions, workup, management plans, prognosis, and understand the risk for further deterioration. Time spent: 17 minutes in direct discussion (face to face and/or over phone).       Signed:  Ciro Aldridge DO

## 2022-06-04 NOTE — H&P
Hospitalist Admission History and Physical         NAME:            Kyra Whiting    Age:                80 y.o.    :               1935    MRN:              [id number]    PCP: SALLIE Alexandre - JOHANA    Consulting MD:    Treatment Team: Attending Provider: Lavonne Gilliland DO; Registered Nurse: Eber Christian RN         Chief Complaint   Patient presents with    Fatigue   HPI:    Patient is a 80 y.o. male who presented to the ED for cc worsening AMS for the past 3-5 days. 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 Has not been eating or drinking well over the past few days. Vitals - stable    Labs- UA consistent with UTI  Past Medical History:   Diagnosis Date    Diabetes ([facility] [address]     Vascular dementia ([facility] [address]             No past surgical history on file. Family History   Problem Relation Age of Onset    No Known Problems Mother     No Known Problems Father        Family history reviewed and negative except as noted above. Social History     Social History Narrative    Not on file            Social History     Tobacco Use    Smoking status: Never Smoker    Smokeless tobacco: Never Used   Substance Use Topics    Alcohol use: No            Social History     Substance and Sexual Activity   Drug Use Not on file                 No Known Allergies         Prior to Admission medications    Medication Sig Start Date End Date Taking?  Authorizing Provider   acetaminophen (TYLENOL) 325 MG tablet Take 650 mg by mouth every 8 hours 22   Ar Automatic Reconciliation   ascorbic acid (VITAMIN C) 500 MG tablet Take by mouth    Ar Automatic Reconciliation   divalproex (DEPAKOTE SPRINKLE) 125 MG capsule Take 125 mg by mouth every 8 hours 22   Ar Automatic Reconciliation   ferrous sulfate (IRON 325) 325 (65 Fe) MG tablet Take by mouth every morning (before breakfast)    Ar Automatic Reconciliation   glipiZIDE (GLUCOTROL) 5 MG tablet Take by mouth daily    Ar Automatic Reconciliation   insulin lispro (HUMALOG) 100 UNIT/ML injection vial INITIATE INSULIN CORRECTIVE PROTOCOL:Normal Insulin Sensitivity For Blood Sugar (mg/dL) of: Less than 150 = 0 units 150 -199 = 2 [id number] -249 = 4 [id number] -299 = 6 [id number] -349 = 8 [id number] and above = 10 units and Call PhysicianInitiate Hypoglycemia protocol if blood glucose is <70 mg/dL Fast Acting - Administer Immediately - or within 15 minutes of start of meal, if mealtime coverage. 2/8/22   Ar Automatic Reconciliation   lidocaine 4 % external patch Apply on intact skin on affected areas 2/9/22   Ar Automatic Reconciliation   midodrine (PROAMATINE) 5 MG tablet Take 5 mg by mouth every 8 hours as needed 2/8/22   Ar Automatic Reconciliation   pravastatin (PRAVACHOL) 20 MG tablet Take 20 mg by mouth    Ar Automatic Reconciliation   pyridoxine (B-6) 100 MG tablet Take 100 mg by mouth daily    Ar Automatic Reconciliation   senna-docusate (PERICOLACE) 8.6-50 MG per tablet Take 2 tablets by mouth daily 2/9/22   Ar Automatic Reconciliation                      Review of Systems      Cannot obtain due to AMS         Objective:         Patient Vitals for the past 24 hrs:   Temp Pulse Resp BP SpO2   06/04/22 1445 -- 65 20 115/60 96 %   06/04/22 1251 98.6 °F (37 °C) 73 16 103/74 98 %            No intake/output data recorded. No intake/output data recorded.          Data Review:   Recent Results (from the past 24 hour(s))   CBC with Auto Differential    Collection Time: 06/04/22  1:02 PM   Result Value Ref Range    WBC 7.1 4.3 - 11.1 K/uL    RBC 4.53 4.23 - 5.6 M/uL    Hemoglobin 13.1 (L) 13.6 - 17.2 g/dL    Hematocrit 40.8 (L) 41.1 - 50.3 %    MCV 90.1 79.6 - 97.8 FL    MCH 28.9 26.1 - 32.9 PG    MCHC 32.1 31.4 - 35.0 g/dL    RDW 12.3 11.9 - 14.6 %    Platelets 852 138 - 405 K/uL    MPV 9.9 9.4 - 12.3 FL    nRBC 0.00 0.0 - 0.2 K/uL    Differential Type AUTOMATED      Seg Neutrophils 82 (H) 43 - 78 %    Lymphocytes 5 (L) 13 - 44 %    Monocytes 12 4.0 - 12.0 %    Eosinophils % 0 (L) 0.5 - 7.8 %    Basophils 0 0.0 - 2.0 %    Immature Granulocytes 1 0.0 - 5.0 %    Segs Absolute 5.8 1.7 - 8.2 K/UL    Absolute Lymph # 0.3 (L) 0.5 - 4.6 K/UL    Absolute Mono # 0.9 0.1 - 1.3 K/UL    Absolute Eos # 0.0 0.0 - 0.8 K/UL    Basophils Absolute 0.0 0.0 - 0.2 K/UL    Absolute Immature Granulocyte 0.1 0.0 - 0.5 K/UL   CMP    Collection Time: 06/04/22  1:02 PM   Result Value Ref Range    Sodium 139 136 - 145 mmol/L    Potassium 4.4 3.5 - 5.1 mmol/L    Chloride 103 98 - 107 mmol/L    CO2 32 21 - 32 mmol/L    Anion Gap 4 (L) 7 - 16 mmol/L    Glucose 184 (H) 65 - 100 mg/dL    BUN 24 (H) 8 - 23 MG/DL    CREATININE 1.00 0.8 - 1.5 MG/DL    GFR African American >60 >60 ml/min/1.73m2    GFR Non- >60 >60 ml/min/1.73m2    Calcium 8.9 8.3 - 10.4 MG/DL    Total Bilirubin 1.0 0.2 - 1.1 MG/DL    ALT 71 (H) 12 - 65 U/L    AST 71 (H) 15 - 37 U/L    Alk Phosphatase 115 50 - 136 U/L    Total Protein 6.8 6.3 - 8.2 g/dL    Albumin 3.2 3.2 - 4.6 g/dL    Globulin 3.6 (H) 2.3 - 3.5 g/dL    Albumin/Globulin Ratio 0.9 (L) 1.2 - 3.5     TSH with Reflex    Collection Time: 06/04/22  1:02 PM   Result Value Ref Range    TSH w Free Thyroid if Abnormal 2.62 0.358 - 3.740 UIU/ML   Lactic Acid Now and in 2 Hours    Collection Time: 06/04/22  1:02 PM   Result Value Ref Range    Lactic Acid, Plasma 1.4 0.4 - 2.0 MMOL/L   EKG 12 Lead    Collection Time: 06/04/22  1:16 PM   Result Value Ref Range    Ventricular Rate 79 BPM    Atrial Rate 0 BPM    QRS Duration 102 ms    Q-T Interval 319 ms    QTc Calculation (Bazett) 354 ms    R Axis 70 degrees    T Axis 82 degrees    Diagnosis Atrial fibrillation    POCT Glucose    Collection Time: 06/04/22  1:43 PM   Result Value Ref Range    POC Glucose 190 (H) 65 - 100 mg/dL    Performed by: MealorStephanieGN    POCT Urinalysis no Micro    Collection Time: 06/04/22  1:56 PM   Result Value Ref Range    Specific Gravity, Urine, POC 1.025 (H) 1.001 - 1.023      pH, Urine, POC 7.0 5.0 - 9.0      Protein, Urine,  (A) NEG mg/dL    Glucose, UA POC Negative NEG mg/dL    KETONES, Urine, POC Negative NEG mg/dL    Bilirubin, Urine, POC Negative NEG      Blood, UA POC LARGE (A) NEG      URINE UROBILINOGEN POC 2.0 (H) 0.2 - 1.0 EU/dL    Nitrate, Urine, POC Negative NEG      Leukocyte Est, UA POC LARGE (A) NEG      Performed by: Memo Hood    Urinalysis w rflx microscopic    Collection Time: 06/04/22  2:03 PM   Result Value Ref Range    Color, UA YELLOW      Appearance CLOUDY      Specific Gravity, UA 1.020 1.001 - 1.023      pH, Urine 6.5 5.0 - 9.0      Protein, UA 30 (A) NEG mg/dL    Glucose, UA Negative mg/dL    Ketones, Urine Negative NEG mg/dL    Bilirubin Urine Negative NEG      Blood, Urine MODERATE (A) NEG      Urobilinogen, Urine 1.0 0.2 - 1.0 EU/dL    Nitrite, Urine Negative NEG      Leukocyte Esterase, Urine MODERATE (A) NEG     Urinalysis, Micro    Collection Time: 06/04/22  2:03 PM   Result Value Ref Range    WBC, UA >100 0 /hpf    RBC, UA 5-10 0 /hpf    Epithelial Cells UA 0 0 /hpf    BACTERIA, URINE 4+ (H) 0 /hpf    Casts 0 0 /lpf    Crystals 0 0 /LPF    Mucus, UA 0 0 /lpf    OTHER OBSERVATIONS RESULTS VERIFIED MANUALLY              Physical Exam:         General:    Asleep. Eyes:    closed    Ears:    Normal     Nose:    Nares normal.     Mouth/Throat:    Slight dry mucosa    Neck:     no JVD. Back:     deferred    Lungs:     Clear to auscultation bilaterally. Heart:    Regular rate and rhythm, S1, S2 normal    Abdomen:     Soft, non-tender. Bowel sounds normal. No masses,  No organomegaly. Extremities:    Extremities normal, atraumatic, no cyanosis or edema.     Skin:    Skin tenting    Neurologic:    Altered, not speaking, withdraws from stimuli         Assessment and Plan Principal Problem:    Acute metabolic encephalopathy  Active Problems:    Complicated UTI (urinary tract infection)    Vascular dementia ([facility] [address]    Diabetes mellitus type 2, controlled ([facility] [address]  Resolved Problems:    * No resolved hospital problems. *    Metabolic encephalopathy secondary to UTI - Ceftriaxone. Urine culture ordered. Very lethargic on exam so will make NPO until more alert to avoid aspiration. Dementia -- Depakote    DM type II - SS    HLD - holding statin due to elevated LFTs, if still elevated tomorrow, may want to order hepatitis panel.      PPD/PT/OT    DVT prophylaxis - Heparin       Signed By:    Lobito Young DO    June 4, 2022

## 2022-06-05 ENCOUNTER — APPOINTMENT (OUTPATIENT)
Dept: GENERAL RADIOLOGY | Age: 87
DRG: 689 | End: 2022-06-05
Payer: MEDICARE

## 2022-06-05 LAB
ALBUMIN SERPL-MCNC: 2.5 G/DL (ref 3.2–4.6)
ALBUMIN/GLOB SERPL: 0.7 {RATIO} (ref 1.2–3.5)
ALP SERPL-CCNC: 91 U/L (ref 50–136)
ALT SERPL-CCNC: 55 U/L (ref 12–65)
ANION GAP SERPL CALC-SCNC: 6 MMOL/L (ref 7–16)
AST SERPL-CCNC: 77 U/L (ref 15–37)
BASOPHILS # BLD: 0 K/UL (ref 0–0.2)
BASOPHILS NFR BLD: 1 % (ref 0–2)
BILIRUB SERPL-MCNC: 0.7 MG/DL (ref 0.2–1.1)
BUN SERPL-MCNC: 20 MG/DL (ref 8–23)
CALCIUM SERPL-MCNC: 8.6 MG/DL (ref 8.3–10.4)
CHLORIDE SERPL-SCNC: 103 MMOL/L (ref 98–107)
CO2 SERPL-SCNC: 30 MMOL/L (ref 21–32)
CREAT SERPL-MCNC: 0.9 MG/DL (ref 0.8–1.5)
DIFFERENTIAL METHOD BLD: ABNORMAL
EOSINOPHIL # BLD: 0 K/UL (ref 0–0.8)
EOSINOPHIL NFR BLD: 0 % (ref 0.5–7.8)
ERYTHROCYTE [DISTWIDTH] IN BLOOD BY AUTOMATED COUNT: 12.3 % (ref 11.9–14.6)
GLOBULIN SER CALC-MCNC: 3.5 G/DL (ref 2.3–3.5)
GLUCOSE BLD STRIP.AUTO-MCNC: 161 MG/DL (ref 65–100)
GLUCOSE BLD STRIP.AUTO-MCNC: 204 MG/DL (ref 65–100)
GLUCOSE BLD STRIP.AUTO-MCNC: 256 MG/DL (ref 65–100)
GLUCOSE BLD STRIP.AUTO-MCNC: 93 MG/DL (ref 65–100)
GLUCOSE SERPL-MCNC: 100 MG/DL (ref 65–100)
HCT VFR BLD AUTO: 36.9 % (ref 41.1–50.3)
HGB BLD-MCNC: 11.6 G/DL (ref 13.6–17.2)
IMM GRANULOCYTES # BLD AUTO: 0 K/UL (ref 0–0.5)
IMM GRANULOCYTES NFR BLD AUTO: 1 % (ref 0–5)
LYMPHOCYTES # BLD: 0.6 K/UL (ref 0.5–4.6)
LYMPHOCYTES NFR BLD: 12 % (ref 13–44)
MCH RBC QN AUTO: 28.5 PG (ref 26.1–32.9)
MCHC RBC AUTO-ENTMCNC: 31.4 G/DL (ref 31.4–35)
MCV RBC AUTO: 90.7 FL (ref 79.6–97.8)
MONOCYTES # BLD: 0.9 K/UL (ref 0.1–1.3)
MONOCYTES NFR BLD: 16 % (ref 4–12)
NEUTS SEG # BLD: 3.9 K/UL (ref 1.7–8.2)
NEUTS SEG NFR BLD: 70 % (ref 43–78)
NRBC # BLD: 0 K/UL (ref 0–0.2)
PLATELET # BLD AUTO: 202 K/UL (ref 150–450)
PMV BLD AUTO: 10.1 FL (ref 9.4–12.3)
POTASSIUM SERPL-SCNC: 4.2 MMOL/L (ref 3.5–5.1)
PROT SERPL-MCNC: 6 G/DL (ref 6.3–8.2)
RBC # BLD AUTO: 4.07 M/UL (ref 4.23–5.6)
SERVICE CMNT-IMP: ABNORMAL
SERVICE CMNT-IMP: NORMAL
SODIUM SERPL-SCNC: 139 MMOL/L (ref 138–145)
WBC # BLD AUTO: 5.5 K/UL (ref 4.3–11.1)

## 2022-06-05 PROCEDURE — 72170 X-RAY EXAM OF PELVIS: CPT

## 2022-06-05 PROCEDURE — 97530 THERAPEUTIC ACTIVITIES: CPT

## 2022-06-05 PROCEDURE — 2580000003 HC RX 258: Performed by: EMERGENCY MEDICINE

## 2022-06-05 PROCEDURE — 73560 X-RAY EXAM OF KNEE 1 OR 2: CPT

## 2022-06-05 PROCEDURE — 6360000002 HC RX W HCPCS: Performed by: FAMILY MEDICINE

## 2022-06-05 PROCEDURE — 2580000003 HC RX 258: Performed by: FAMILY MEDICINE

## 2022-06-05 PROCEDURE — 80053 COMPREHEN METABOLIC PANEL: CPT

## 2022-06-05 PROCEDURE — 97166 OT EVAL MOD COMPLEX 45 MIN: CPT

## 2022-06-05 PROCEDURE — 97161 PT EVAL LOW COMPLEX 20 MIN: CPT

## 2022-06-05 PROCEDURE — 36415 COLL VENOUS BLD VENIPUNCTURE: CPT

## 2022-06-05 PROCEDURE — 6370000000 HC RX 637 (ALT 250 FOR IP): Performed by: FAMILY MEDICINE

## 2022-06-05 PROCEDURE — 97112 NEUROMUSCULAR REEDUCATION: CPT

## 2022-06-05 PROCEDURE — 82962 GLUCOSE BLOOD TEST: CPT

## 2022-06-05 PROCEDURE — 1100000000 HC RM PRIVATE

## 2022-06-05 PROCEDURE — 85025 COMPLETE CBC W/AUTO DIFF WBC: CPT

## 2022-06-05 RX ADMIN — CEFTRIAXONE 1000 MG: 1 INJECTION, POWDER, FOR SOLUTION INTRAMUSCULAR; INTRAVENOUS at 14:00

## 2022-06-05 RX ADMIN — DIVALPROEX SODIUM 125 MG: 125 CAPSULE ORAL at 17:20

## 2022-06-05 RX ADMIN — FERROUS SULFATE TAB 325 MG (65 MG ELEMENTAL FE) 325 MG: 325 (65 FE) TAB at 08:33

## 2022-06-05 RX ADMIN — SENNOSIDES AND DOCUSATE SODIUM 2 TABLET: 8.6; 5 TABLET ORAL at 08:33

## 2022-06-05 RX ADMIN — SODIUM CHLORIDE: 9 INJECTION, SOLUTION INTRAVENOUS at 17:02

## 2022-06-05 RX ADMIN — SODIUM CHLORIDE, PRESERVATIVE FREE 3 ML: 5 INJECTION INTRAVENOUS at 21:26

## 2022-06-05 RX ADMIN — SODIUM CHLORIDE, PRESERVATIVE FREE 3 ML: 5 INJECTION INTRAVENOUS at 11:49

## 2022-06-05 RX ADMIN — Medication 100 MG: at 08:32

## 2022-06-05 RX ADMIN — SODIUM CHLORIDE, PRESERVATIVE FREE 10 ML: 5 INJECTION INTRAVENOUS at 08:34

## 2022-06-05 RX ADMIN — OXYCODONE HYDROCHLORIDE AND ACETAMINOPHEN 500 MG: 500 TABLET ORAL at 08:33

## 2022-06-05 RX ADMIN — DIVALPROEX SODIUM 125 MG: 125 CAPSULE ORAL at 01:37

## 2022-06-05 RX ADMIN — SODIUM CHLORIDE, PRESERVATIVE FREE 10 ML: 5 INJECTION INTRAVENOUS at 21:25

## 2022-06-05 RX ADMIN — INSULIN LISPRO 1 UNITS: 100 INJECTION, SOLUTION INTRAVENOUS; SUBCUTANEOUS at 17:20

## 2022-06-05 RX ADMIN — DIVALPROEX SODIUM 125 MG: 125 CAPSULE ORAL at 08:33

## 2022-06-05 RX ADMIN — HEPARIN SODIUM 5000 UNITS: 5000 INJECTION INTRAVENOUS; SUBCUTANEOUS at 05:12

## 2022-06-05 RX ADMIN — SODIUM CHLORIDE, PRESERVATIVE FREE 3 ML: 5 INJECTION INTRAVENOUS at 04:33

## 2022-06-05 RX ADMIN — DIVALPROEX SODIUM 125 MG: 125 CAPSULE ORAL at 21:26

## 2022-06-05 RX ADMIN — HEPARIN SODIUM 5000 UNITS: 5000 INJECTION INTRAVENOUS; SUBCUTANEOUS at 21:25

## 2022-06-05 RX ADMIN — HEPARIN SODIUM 5000 UNITS: 5000 INJECTION INTRAVENOUS; SUBCUTANEOUS at 14:01

## 2022-06-05 RX ADMIN — SODIUM CHLORIDE: 9 INJECTION, SOLUTION INTRAVENOUS at 04:33

## 2022-06-05 ASSESSMENT — PAIN SCALES - PAIN ASSESSMENT IN ADVANCED DEMENTIA (PAINAD)
FACIALEXPRESSION: 0
CONSOLABILITY: 0
FACIALEXPRESSION: 0
BODYLANGUAGE: 0
NEGVOCALIZATION: 0
CONSOLABILITY: 0
BREATHING: 0
NEGVOCALIZATION: 0
TOTALSCORE: 0
BODYLANGUAGE: 0
TOTALSCORE: 0
BREATHING: 0
CONSOLABILITY: 0
BODYLANGUAGE: 0
NEGVOCALIZATION: 0
BREATHING: 0
FACIALEXPRESSION: 0
TOTALSCORE: 0

## 2022-06-05 ASSESSMENT — PAIN SCALES - GENERAL
PAINLEVEL_OUTOF10: 0

## 2022-06-05 NOTE — PROGRESS NOTES
Hourly rounds in progress. Alert, oriented to person this am.  Awoke yelling out x 1; reassured and calmed down. Took med with applesauce followed by water; swallowed without problems and tolerated well. IVF infusing. Incontinent strong odor urine. He does have sacral breakdown. Bed alarm on.

## 2022-06-05 NOTE — PROGRESS NOTES
TRANSFER - IN REPORT:    Verbal report received from Sushant Fall on Ramiro Lancaster  being received from ED for routine progression of patient care      Report consisted of patient's Situation, Background, Assessment and   Recommendations(SBAR). Information from the following report(s) Nurse Handoff Report was reviewed with the receiving nurse. Opportunity for questions and clarification was provided. Assessment completed upon patient's arrival to unit and care assumed.

## 2022-06-05 NOTE — PROGRESS NOTES
PHYSICAL THERAPY Initial Assessment, Daily Note and AM  (Link to Caseload Tracking: PT Visit Days : 1  Acknowledge Orders  Time In/Out  PT Charge Capture  Rehab Caseload Tracker    Lashawn Jacobs is a 80 y.o. male   PRIMARY DIAGNOSIS: Acute metabolic encephalopathy  Delirium [R41.0]  Acute cystitis without hematuria [N30.00]  Dementia without behavioral disturbance, unspecified dementia type ([facility] [address] [G49.76]  Acute metabolic encephalopathy [Z13.69]       Reason for Referral: Generalized Muscle Weakness (M62.81)  Other lack of cordination (R27.8)  Difficulty in walking, Not elsewhere classified (R26.2)  Other abnormalities of gait and mobility (R26.89)  Unspecified Lack of Coordination (R27.9)  Low Back Pain (M54.5)  Inpatient: Payor: MEDICARE / Plan: MEDICARE PART A AND B / Product Type: *No Product type* /     ASSESSMENT:     REHAB RECOMMENDATIONS:   Recommendation to date pending progress:  Setting:   return to facility    Equipment:     None     ASSESSMENT:  Mr. Jermaine Jacobs presents in supine, A&Ox1. His adult daughter is present, but pt is agitated and confused during session. At EOB he begins a resisting, posterior lean and therapy is limited today due to cognition. Upon entering, Pnt is agreeable to PT treatment. he reports un-numbered pain in his low back at rest. Pnt performed supine > sit with max Ax2 and significant extra time and participation cues , sitting EOB with fair to fair- sitting balance control. He fatigues quickly and becomes more agitated at EOB, requiring max Ax2 to perform sit to supine in bed, but is able to scoot himself up in supine, followed by positioning for comfort. At end of session pt supine in bed with all needs within reach, alarm activated for safety, RN notified. Overall, he presents as functioning below his baseline, with deficits in mobility including transfers, gait, balance, and activity tolerance.  Pt will continue to benefit from skilled therapy services to address stated deficits to promote return to highest level of function, independence, and safety. Will continue to follow. [facility] [address] [address] AM-PAC 6 Clicks Basic Mobility Inpatient Short Form  AM-PAC Mobility Inpatient   How much difficulty turning over in bed?: A Little  How much difficulty sitting down on / standing up from a chair with arms?: A Little  How much difficulty moving from lying on back to sitting on side of bed?: A Little  How much help from another person moving to and from a bed to a chair?: A Lot  How much help from another person needed to walk in hospital room?: A Lot  How much help from another person for climbing 3-5 steps with a railing?: A Lot  AM-PAC Inpatient Mobility Raw Score : 15  AM-PAC Inpatient T-Scale Score : 39.45  Mobility Inpatient CMS 0-100% Score: 57.7  Mobility Inpatient CMS G-Code Modifier : CK    SUBJECTIVE:   Mr. Franca Steven states, \"I don't know what's going on.  Nothing will make this better\"     Social/Functional Lives With:  (memory care facility)    OBJECTIVE:     PAIN: [facility] / O2: PRECAUTION / Wayna Bolder / DRAINS:   Pre Treatment:   Pain Assessment: 0-10  Pain Level: 0      Post Treatment: 0 Vitals        Oxygen      IV    RESTRICTIONS/PRECAUTIONS:                    GROSS EVALUATION: Intact Impaired (Comments):   AROM [x] Decreased global extension   PROM [x]    Strength []     Balance []   posterior lean in sitting   Posture [] Forward Head  Rounded Shoulders   Sensation [x]     Coordination []      Tone [x]     Edema []    Activity Tolerance []  deconditioned     []      COGNITION/  PERCEPTION: Intact Impaired (Comments):   Orientation []  x1   Vision [x]     Hearing [x]     Cognition  []   advanced dementia     MOBILITY: I Mod I S SBA CGA Min Mod Max Total  NT x2 Comments:   Bed Mobility    Rolling [] [] [] [] [] [] [] [x] [] [] [x]    Supine to Sit [] [] [] [] [] [] [] [x] [] [] [x]    Scooting [] [] [] [] [] [] [] [x] [] [] [x]    Sit to Supine [] [] [] [] [] [] [] [x] [] [] [x]    Transfers    Sit to Stand [] [] [] [] [] [] [] [] [] [x] []    Bed to Chair [] [] [] [] [] [] [] [] [] [x] []    Stand to Sit [] [] [] [] [] [] [] [] [] [x] []     [] [] [] [] [] [] [] [] [] [] []    I=Independent, Mod I=Modified Independent, S=Supervision, SBA=Standby Assistance, CGA=Contact Guard Assistance,   Min=Minimal Assistance, Mod=Moderate Assistance, Max=Maximal Assistance, Total=Total Assistance, NT=Not Tested    GAIT: I Mod I S SBA CGA Min Mod Max Total  NT x2 Comments:   Level of Assistance [] [] [] [] [] [] [] [] [] [x] []    Distance   feet    DME None    Gait Quality n/a    Weightbearing Status      Stairs      I=Independent, Mod I=Modified Independent, S=Supervision, SBA=Standby Assistance, CGA=Contact Guard Assistance,   Min=Minimal Assistance, Mod=Moderate Assistance, Max=Maximal Assistance, Total=Total Assistance, NT=Not Tested    PLAN:   ACUTE PHYSICAL THERAPY GOALS:   (Developed with and agreed upon by patient and/or caregiver. )    LTG:  (1.)Mr. Amara Meyers will move from supine to sit and sit to supine , scoot up and down and roll side to side in bed with MINIMAL ASSIST within 7 treatment day(s). (2.)Mr. Amara Meyers will transfer from bed to chair and chair to bed with MODERATE ASSIST using the least restrictive device within 7 treatment day(s). (3.)Mr. Amara Meyers will ambulate with MODERATE ASSIST for 15 feet with the least restrictive device within 7 treatment day(s). (4.)Mr. Amara Meyers will tolerate at least 23 min of dynamic standing activity to assist standing ADLs with the least restrictive device within 7 treatment days.       ________________________________________________________________________________________________        FREQUENCY AND DURATION: 3 times/week for duration of hospital stay or until stated goals are met, whichever comes first.    THERAPY PROGNOSIS: Fair    PROBLEM LIST:   (Skilled intervention is medically necessary to address:)  Decreased ADL/Functional Activities  Decreased Activity Tolerance  Decreased AROM/PROM  Decreased Balance  Decreased Cognition  Decreased Coordination  Decreased Gait Ability  Decreased Safety Awareness  Decreased Strength  Decreased Transfer Abilities INTERVENTIONS PLANNED:   (Benefits and precautions of physical therapy have been discussed with the patient.)  Self Care Training  Therapeutic Activity  Therapeutic Exercise/HEP  Neuromuscular Re-education  Gait Training  Manual Therapy  Modalities  Education       TREATMENT:   EVALUATION: LOW COMPLEXITY: (Untimed Charge)    TREATMENT:   Co-Treatment PT/OT necessary due to patient's decreased overall endurance/tolerance levels, as well as need for high level skilled assistance to complete functional transfers/mobility and functional tasks  Therapeutic Activity (12 Minutes): Therapeutic activity included Rolling, Supine to Sit, Sit to Supine, Scooting and Sitting balance  to improve functional Activity tolerance, Balance, Coordination, Mobility, Strength and ROM.     TREATMENT GRID:  N/A    AFTER TREATMENT PRECAUTIONS: Alarm Activated, Bed, Bed/Chair Locked, RN notified, Side rails x3 and Visitors at bedside    INTERDISCIPLINARY COLLABORATION:  RN/ PCT, PT/ PTA and OT/ JOHNS    EDUCATION: Education Given To: Family    TIME IN/OUT:  Time In: [facility]  Time Out: [address]  Minutes: [address] [facility] Vasyl Leiva PT

## 2022-06-05 NOTE — PROGRESS NOTES
OCCUPATIONAL THERAPY Initial Assessment and Daily Note       OT Visit Days: 1  Acknowledge Orders  Time  OT Charge Capture  Rehab Caseload Tracker      Gena Oviedo is a 80 y.o. male   PRIMARY DIAGNOSIS: Acute metabolic encephalopathy  Delirium [R41.0]  Acute cystitis without hematuria [N30.00]  Dementia without behavioral disturbance, unspecified dementia type ([facility] [address] [C17.43]  Acute metabolic encephalopathy [F34.37]       Reason for Referral: Generalized Muscle Weakness (M62.81)  Difficulty in walking, Not elsewhere classified (R26.2)  Other abnormalities of gait and mobility (R26.89)  Inpatient: Payor: MEDICARE / Plan: MEDICARE PART A AND B / Product Type: *No Product type* /     ASSESSMENT:     REHAB RECOMMENDATIONS:   Recommendation to date pending progress:  Setting:   Back to memory care    Equipment:     None--pt has all needs at [facility]     ASSESSMENT:  Mr. Jeff Wooten is an 79 y/o male presents with UTI and AMS. Pt has hx advanced dementia and lives in [facility] memory care per daughter present in room. Pt daughter reports pt is not normally this confused or difficult to transfer. She states pt is able to complete ADLs with [facility] staff with mod A and transfer into his w/c with [facility]. Today pt presents with decreased activity tolerance, balance, mobility and cognition impacting ADLs. Pt overall max A x2 for bed mobility and noted to have strong posterior lean when sitting EOB. Pt with little to no command following due to confusion. Pt body was rigid during transfer and when sitting EOB making bending knees or trunk very difficult. Pt able to wash his face EOB with min A and poor sitting balance. Pt fatigues quickly and perseverates on returning to supine. Pt daughter was very helpful in session as a familiar face to pt and to help calm pt down. Pt is currently functioning below baseline and would benefit from skilled OT services to address OT goals and plan of care.       [facility] Activity Inpatient Short Form:    AM-PAC Daily Activity Inpatient   How much help for putting on and taking off regular lower body clothing?: Total  How much help for Bathing?: A Lot  How much help for Toileting?: Total  How much help for putting on and taking off regular upper body clothing?: A Lot  How much help for taking care of personal grooming?: A Lot  How much help for eating meals?: A Lot  AM-[facility] Inpatient Daily Activity Raw Score: 10  AM-PAC Inpatient ADL T-Scale Score : 27.31  ADL Inpatient CMS 0-100% Score: 74.7  ADL Inpatient CMS G-Code Modifier : CL           SUBJECTIVE:     Mr. Pantoja Aw states, \"Don't be taking me every which way\"     Social/Functional Lives With:  (memory care facility)  Bathroom Shower/Tub: Walk-in shower  Bathroom Equipment: Shower chair  ADL Assistance: Needs assistance  Ambulation Assistance: Needs assistance  Transfer Assistance: Needs assistance    OBJECTIVE:     Russell Yañez / Clemencia Humphreys / Digna Rosalva: IV    RESTRICTIONS/PRECAUTIONS:  Restrictions/Precautions: Fall Risk    PAIN: Berkley Fitch / O2:   Pre Treatment:   Pain Assessment: None - Denies Pain      Post Treatment: no c/o pain, resting comfortably in supine       Vitals          Oxygen            GROSS EVALUATION: INTACT IMPAIRED   (See Comments)   UE AROM [x] []   UE PROM [] []resisted PROM   Strength [x]       Posture / Balance [] Posture: Poor  Sitting - Static: Poor  Sitting - Dynamic: Poor   Sensation []  unable to be tested due to cognition   Coordination []  gross motor impairments     Tone [x]       Edema [x] NA    Activity Tolerance []   limited by fatigue and cognition     Hand Dominance R [] L []      COGNITION/  PERCEPTION: INTACT IMPAIRED   (See Comments)   Orientation []  AAO x1--responded to name   Vision [x]     Hearing []  [place]   Cognition  []  hx advanced dementia   Perception []  decreased safety awareness     MOBILITY: I Mod I S SBA CGA Min Mod Max Total  NT x2 Comments:   Bed Mobility    Rolling [] [] [] [] [] [] [] [] [] [x] []    Supine to Sit [] [] [] [] [] [] [] [x] [] [] [x]    Scooting [] [] [] [] [] [] [x] [] [] [] [x]    Sit to Supine [] [] [] [] [] [] [] [x] [] [] [x]    Transfers    Sit to Stand [] [] [] [] [] [] [] [] [] [x] []    Bed to Chair [] [] [] [] [] [] [] [] [] [x] []    Stand to Sit [] [] [] [] [] [] [] [] [] [x] []    Tub/Shower [] [] [] [] [] [] [] [] [] [x] []     Toilet [] [] [] [] [] [] [] [] [] [x] []      [] [] [] [] [] [] [] [] [] [x] []    I=Independent, Mod I=Modified Independent, S=Supervision/Setup, SBA=Standby Assistance, CGA=Contact Guard Assistance, Min=Minimal Assistance, Mod=Moderate Assistance, Max=Maximal Assistance, Total=Total Assistance, NT=Not Tested    ACTIVITIES OF DAILY LIVING: I Mod I S SBA CGA Min Mod Max Total NT Comments   BASIC ADLs:              Bathing/ Showering [] [] [] [] [] [] [] [] [] [x]    Toileting [] [] [] [] [] [] [] [] [] [x]    Upper Body Dressing [] [] [] [] [] [] [] [] [] [x]    Lower Body Dressing [] [] [] [] [] [] [] [] [] [x]    Feeding [] [] [] [] [] [] [] [] [] [x]    Grooming [] [] [] [] [] [x] [] [] [] [] Washing face EOB--poor sitting balance and cues for initiation   Personal Device Care [] [] [] [] [] [] [] [] [] [x]    Functional Mobility [] [] [] [] [] [] [] [x] [] [] x2   I=Independent, Mod I=Modified Independent, S=Supervision/Setup, SBA=Standby Assistance, CGA=Contact Guard Assistance, Min=Minimal Assistance, Mod=Moderate Assistance, Max=Maximal Assistance, Total=Total Assistance, NT=Not Tested    PLAN:     FREQUENCY/DURATION   OT Plan of Care: 3 times/week for duration of hospital stay or until stated goals are met, whichever comes first.    ACUTE OCCUPATIONAL THERAPY GOALS:   (Developed with and agreed upon by patient and/or caregiver.)  1. Patient will complete upper body bathing and dressing with min A and adaptive equipment as needed.    2. Patient will complete self-grooming tasks in unsupported sitting with set-up and adaptive equipment as needed. .   3. Patient will tolerate 25 minutes of OT treatment with 1-2 rest breaks to increase activity tolerance for ADLs. 4. Patient will complete functional transfers with min A and adaptive equipment as needed. 5. Patient will follow 75% commands with min verbal cues from therapist to increase participation in ADLs. 6. Patient will tolerate 15 minutes unsupported sitting balance with SBA in preparation for ADL performance. Timeframe: 7 visits      PROBLEM LIST:   (Skilled intervention is medically necessary to address:)  Decreased ADL/Functional Activities  Decreased Activity Tolerance  Decreased AROM/PROM  Decreased Balance  Decreased Cognition  Decreased Coordination  Decreased Gait Ability  Decreased Safety Awareness  Decreased Transfer Abilities   INTERVENTIONS PLANNED:  (Benefits and precautions of occupational therapy have been discussed with the patient.)  Self Care Training  Therapeutic Activity  Therapeutic Exercise/HEP  Neuromuscular Re-education  Manual Therapy  Education         TREATMENT:     EVALUATION: MODERATE COMPLEXITY: (Untimed Charge)    TREATMENT:   Co-Treatment PT/OT necessary due to patient's decreased overall endurance/tolerance levels, as well as need for high level skilled assistance to complete functional transfers/mobility and functional tasks  Neuromuscular Re-education (12 Minutes): Neuromuscular Re-education included Balance Training, Coordination training, Functional mobility with facilitation, Postural training and Sitting balance training to improve Balance, Coordination, Functional Mobility and Postural Control.     TREATMENT GRID:  N/A    AFTER TREATMENT PRECAUTIONS: Alarm Activated, Bed, Call light within reach, Needs within reach, RN notified and Visitors at bedside    INTERDISCIPLINARY COLLABORATION:  RN/ PCT, PT/ PTA and OT/ JOHNS    EDUCATION:   to family about plan of care and role of therapy    TOTAL TREATMENT DURATION AND TIME:  Time In: 1014  Time Out: [facility]  Minutes: [address] [facility],Sixth [facility], OT

## 2022-06-05 NOTE — PLAN OF CARE
Problem: Discharge Planning  Goal: Discharge to home or other facility with appropriate resources  6/5/2022 0804 by Beth Harada, RN  Outcome: Progressing  Flowsheets (Taken 6/5/2022 7838)  Discharge to home or other facility with appropriate resources: Identify barriers to discharge with patient and caregiver  6/4/2022 2126 by Benedicto Donahue RN  Outcome: Progressing  Flowsheets (Taken 6/4/2022 2035)  Discharge to home or other facility with appropriate resources: Identify discharge learning needs (meds, wound care, etc)     Problem: Pain  Goal: Verbalizes/displays adequate comfort level or baseline comfort level  6/5/2022 0804 by Beth Harada, RN  Outcome: Progressing  6/4/2022 2126 by Benedicto Donahue RN  Outcome: Progressing     Problem: Skin/Tissue Integrity  Goal: Absence of new skin breakdown  Description: 1. Monitor for areas of redness and/or skin breakdown  2. Assess vascular access sites hourly  3. Every 4-6 hours minimum:  Change oxygen saturation probe site  4. Every 4-6 hours:  If on nasal continuous positive airway pressure, respiratory therapy assess nares and determine need for appliance change or resting period.   6/5/2022 0804 by Beth Harada, RN  Outcome: Progressing  6/4/2022 2126 by Benedicto Donahue RN  Outcome: Progressing     Problem: Safety - Adult  Goal: Free from fall injury  6/5/2022 0804 by Beth Harada, RN  Outcome: Progressing  Flowsheets  Taken 6/5/2022 0803  Free From Fall Injury: Instruct family/caregiver on patient safety  Taken 6/5/2022 0801  Free From Fall Injury: Instruct family/caregiver on patient safety  6/4/2022 2126 by Benedicto Donahue RN  Outcome: Progressing     Problem: ABCDS Injury Assessment  Goal: Absence of physical injury  6/5/2022 0804 by Beth Harada, RN  Outcome: Progressing  Flowsheets (Taken 6/5/2022 0801)  Absence of Physical Injury: Implement safety measures based on patient assessment  6/4/2022 2126 by Benedicto Donahue RN  Outcome: Progressing     Problem: Confusion  Goal: Confusion, delirium, dementia, or psychosis is improved or at baseline  Description: INTERVENTIONS:  1. Assess for possible contributors to thought disturbance, including medications, impaired vision or hearing, underlying metabolic abnormalities, dehydration, psychiatric diagnoses, and notify attending LIP  2. [place] high risk fall precautions, as indicated  3. Provide frequent short contacts to provide reality reorientation, refocusing and direction  4. Decrease environmental stimuli, including noise as appropriate  5. Monitor and intervene to maintain adequate nutrition, hydration, elimination, sleep and activity  6. If unable to ensure safety without constant attention obtain sitter and review sitter guidelines with assigned personnel  7.  Initiate Psychosocial CNS and Spiritual Care consult, as indicated  6/5/2022 0804 by Marlo Barnes RN  Outcome: Progressing  6/4/2022 2126 by Una Owens RN  Outcome: Progressing

## 2022-06-05 NOTE — PROGRESS NOTES
Progress Note    Patient: Allie De Los Santos MRN: [id number]  SSN: [id number]    YOB: 1935  Age: 80 y.o. Sex: male      Admit Date: 6/4/2022    LOS: 1 day     Assessment and Plan:   42-year-old male with past medical history of diabetes, hyperlipidemia, vascular dementia, multiple myeloma in remission, bradycardia status post pacemaker, presented in setting of worsening altered mental status    1. Acute encephalopathy likely in setting of UTI and underlying dementia  -CT of the brain without acute intracranial abnormalities  -Obtain TSH, ammonia, B12 levels  -Continue ceftriaxone  -Follow urine cultures  -Symptomatic management  -Continue Depakote  -Monitor mental status  -Avoid sedatives  -Delirium precautions    2. Diabetes mellitus  -Insulin sliding scale  -Blood sugar checks before meals and at bedtime    DVT prophylaxis with heparin subcu    Subjective:   42-year-old male with past medical history of diabetes, hyperlipidemia, vascular dementia, multiple myeloma in remission, bradycardia status post pacemaker, presented in setting of worsening altered mental status. Patient seen and examined at bedside. This morning the patient awake and mildly confused. Otherwise denies any chest pain, no abdominal pain, no nausea vomiting or diarrhea.     Objective:     Vitals:    06/04/22 2039 06/04/22 2355 06/05/22 0718 06/05/22 1118   BP: 108/74 122/71 (!) 132/99 122/67   Pulse: 59 58 54 56   Resp: 16 16 16 16   Temp: 98.2 °F (36.8 °C) 99.5 °F (37.5 °C) 98 °F (36.7 °C) 98.8 °F (37.1 °C)   TempSrc:   Oral Oral   SpO2: 94% 94% 95% 97%   Weight:       Height:            Intake and Output:  Current Shift: 06/05 0701 - 06/05 1900  In: 120 [P.O.:120]  Out: -   Last three shifts: 06/03 1901 - 06/05 0700  In: 100 [P.O.:100]  Out: 3 [Urine:3]    ROS  10 ROS negative except from stated on subjective    Physical Exam:   General: Alert, mildly confused, NAD  HEENT: NC/AT, EOM are intact  Neck: supple, no JVD  Cardiovascular: RRR, S1, S2, no murmurs  Respiratory: Lungs are clear, no wheezes or rales  Abdomen: Soft, NT, ND  Back: No CVA tenderness, no paraspinal tenderness  Extremities: LE without pedal edema, no erythema  Neuro: CN are intact, no focal deficits  Skin: no rash or ulcers  Psych: good mood and affect    Lab/Data Review:  I have personally reviewed patients laboratory data showing  Recent Results (from the past 24 hour(s))   POCT Glucose    Collection Time: 06/04/22  8:41 PM   Result Value Ref Range    POC Glucose 95 65 - 100 mg/dL    Performed by:  Leoncio    Comprehensive Metabolic Panel w/ Reflex to MG    Collection Time: 06/05/22  5:55 AM   Result Value Ref Range    Sodium 139 138 - 145 mmol/L    Potassium 4.2 3.5 - 5.1 mmol/L    Chloride 103 98 - 107 mmol/L    CO2 30 21 - 32 mmol/L    Anion Gap 6 (L) 7 - 16 mmol/L    Glucose 100 65 - 100 mg/dL    BUN 20 8 - 23 MG/DL    CREATININE 0.90 0.8 - 1.5 MG/DL    GFR African American >60 >60 ml/min/1.73m2    GFR Non- >60 >60 ml/min/1.73m2    Calcium 8.6 8.3 - 10.4 MG/DL    Total Bilirubin 0.7 0.2 - 1.1 MG/DL    ALT 55 12 - 65 U/L    AST 77 (H) 15 - 37 U/L    Alk Phosphatase 91 50 - 136 U/L    Total Protein 6.0 (L) 6.3 - 8.2 g/dL    Albumin 2.5 (L) 3.2 - 4.6 g/dL    Globulin 3.5 2.3 - 3.5 g/dL    Albumin/Globulin Ratio 0.7 (L) 1.2 - 3.5     CBC with Auto Differential    Collection Time: 06/05/22  5:55 AM   Result Value Ref Range    WBC 5.5 4.3 - 11.1 K/uL    RBC 4.07 (L) 4.23 - 5.6 M/uL    Hemoglobin 11.6 (L) 13.6 - 17.2 g/dL    Hematocrit 36.9 (L) 41.1 - 50.3 %    MCV 90.7 79.6 - 97.8 FL    MCH 28.5 26.1 - 32.9 PG    MCHC 31.4 31.4 - 35.0 g/dL    RDW 12.3 11.9 - 14.6 %    Platelets 771 976 - 803 K/uL    MPV 10.1 9.4 - 12.3 FL    nRBC 0.00 0.0 - 0.2 K/uL    Differential Type AUTOMATED      Seg Neutrophils 70 43 - 78 %    Lymphocytes 12 (L) 13 - 44 %    Monocytes 16 (H) 4.0 - 12.0 %    Eosinophils % 0 (L) 0.5 - 7.8 %    Basophils 1 0.0 - 2.0 %    Immature Granulocytes 1 0.0 - 5.0 %    Segs Absolute 3.9 1.7 - 8.2 K/UL    Absolute Lymph # 0.6 0.5 - 4.6 K/UL    Absolute Mono # 0.9 0.1 - 1.3 K/UL    Absolute Eos # 0.0 0.0 - 0.8 K/UL    Basophils Absolute 0.0 0.0 - 0.2 K/UL    Absolute Immature Granulocyte 0.0 0.0 - 0.5 K/UL   POCT Glucose    Collection Time: 06/05/22  7:18 AM   Result Value Ref Range    POC Glucose 93 65 - 100 mg/dL    Performed by: WallacePreeyaPCT    POCT Glucose    Collection Time: 06/05/22 11:18 AM   Result Value Ref Range    POC Glucose 161 (H) 65 - 100 mg/dL    Performed by: AmadouPreeyaPCT       @[email or web address]@     Image:  I have personally reviewed patients imaging showing  XR PELVIS (1-2 VIEWS)   Final Result   No acute findings      XR KNEE LEFT (1-2 VIEWS)   Final Result   No acute findings         CT Head W/O Contrast   Final Result   Chronic appearing changes without acute intracranial abnormality.             XR CHEST PORTABLE   Final Result           Current Facility-Administered Medications   Medication Dose Route Frequency Provider Last Rate Last Admin    sodium chloride flush 0.9 % injection 3 mL  3 mL IntraVENous Q8H Dayana Valladares III, MD   3 mL at 06/05/22 1149    ascorbic acid (VITAMIN C) tablet 500 mg  500 mg Oral Daily Buzzy Ramp, DO   500 mg at 06/05/22 3476    divalproex (DEPAKOTE SPRINKLE) capsule 125 mg  125 mg Oral Q8H Buzzy Ramp, DO   125 mg at 06/05/22 1310    ferrous sulfate (IRON 325) tablet 325 mg  325 mg Oral QAM AC Buzzy Ramp, DO   325 mg at 06/05/22 9735    sennosides-docusate sodium (SENOKOT-S) 8.6-50 MG tablet 2 tablet  2 tablet Oral Daily Buzzy Ramp, DO   2 tablet at 06/05/22 7284    vitamin B-6 (PYRIDOXINE) tablet 100 mg  100 mg Oral Daily Buzzy Ramp, DO   100 mg at 06/05/22 1321    cefTRIAXone (ROCEPHIN) 1000 mg IVPB in NS 50ml minibag  1,000 mg IntraVENous Q24H Buzzy Ramp,  mL/hr at 06/05/22 1400 1,000 mg at 06/05/22 1400    sodium chloride flush 0.9 % injection 5-40 mL  5-40 mL IntraVENous 2 times per day Lavonne Goody, DO   10 mL at 06/05/22 0834    sodium chloride flush 0.9 % injection 5-40 mL  5-40 mL IntraVENous PRN Lavonne Goody, DO        0.9 % sodium chloride infusion   IntraVENous PRN Lavonne Goody, DO        ondansetron (ZOFRAN-ODT) disintegrating tablet 4 mg  4 mg Oral Q8H PRN Lavonne Goody, DO        Or    ondansetron [facility]) injection 4 mg  4 mg IntraVENous Q6H PRN Lavonne Goody, DO        acetaminophen (TYLENOL) tablet 650 mg  650 mg Oral Q6H PRN Lavonne Goody, DO        Or    acetaminophen (TYLENOL) suppository 650 mg  650 mg Rectal Q6H PRN Lavonne Goody, DO        heparin (porcine) injection 5,000 Units  5,000 Units SubCUTAneous 3 times per day Lavonne Goody, DO   5,000 Units at 06/05/22 1401    tuberculin injection 5 Units  5 Units IntraDERmal Once Lavonne Goodstan, DO   5 Units at 06/04/22 2137    insulin lispro (HUMALOG) injection vial 0-4 Units  0-4 Units SubCUTAneous TID WC Lavonne Goody, DO        insulin lispro (HUMALOG) injection vial 0-4 Units  0-4 Units SubCUTAneous Nightly Lavonne Goody, DO        0.9 % sodium chloride infusion   IntraVENous Continuous Lavonne Goody, DO 75 mL/hr at 06/05/22 0433 New Bag at 06/05/22 0433        Hospital problems     Patient Active Problem List   Diagnosis    Vascular dementia ([facility] [address]    Diabetes mellitus type 2, controlled ([address]    Closed displaced fracture of left femoral neck (HCC)    Symptomatic bradycardia    Acute metabolic encephalopathy    Complicated UTI (urinary tract infection)        I have reviewed, updated, and verified this note's content and spent 38 minutes of my 42 minutes visit performing counseling and coordination of care regarding medical management.        Signed By: Martin Garcia MD     June 5, 2022

## 2022-06-06 LAB
AMMONIA PLAS-SCNC: 80 UMOL/L (ref 11–32)
ANION GAP SERPL CALC-SCNC: 6 MMOL/L (ref 7–16)
BASOPHILS # BLD: 0 K/UL (ref 0–0.2)
BASOPHILS NFR BLD: 1 % (ref 0–2)
BUN SERPL-MCNC: 15 MG/DL (ref 8–23)
CALCIUM SERPL-MCNC: 8.7 MG/DL (ref 8.3–10.4)
CHLORIDE SERPL-SCNC: 104 MMOL/L (ref 98–107)
CO2 SERPL-SCNC: 30 MMOL/L (ref 21–32)
CREAT SERPL-MCNC: 0.7 MG/DL (ref 0.8–1.5)
DIFFERENTIAL METHOD BLD: ABNORMAL
EOSINOPHIL # BLD: 0.1 K/UL (ref 0–0.8)
EOSINOPHIL NFR BLD: 2 % (ref 0.5–7.8)
ERYTHROCYTE [DISTWIDTH] IN BLOOD BY AUTOMATED COUNT: 11.9 % (ref 11.9–14.6)
GLUCOSE BLD STRIP.AUTO-MCNC: 186 MG/DL (ref 65–100)
GLUCOSE BLD STRIP.AUTO-MCNC: 186 MG/DL (ref 65–100)
GLUCOSE BLD STRIP.AUTO-MCNC: 233 MG/DL (ref 65–100)
GLUCOSE BLD STRIP.AUTO-MCNC: 306 MG/DL (ref 65–100)
GLUCOSE SERPL-MCNC: 197 MG/DL (ref 65–100)
HCT VFR BLD AUTO: 37.9 % (ref 41.1–50.3)
HGB BLD-MCNC: 12.4 G/DL (ref 13.6–17.2)
IMM GRANULOCYTES # BLD AUTO: 0 K/UL (ref 0–0.5)
IMM GRANULOCYTES NFR BLD AUTO: 1 % (ref 0–5)
LYMPHOCYTES # BLD: 0.5 K/UL (ref 0.5–4.6)
LYMPHOCYTES NFR BLD: 12 % (ref 13–44)
MCH RBC QN AUTO: 28.8 PG (ref 26.1–32.9)
MCHC RBC AUTO-ENTMCNC: 32.7 G/DL (ref 31.4–35)
MCV RBC AUTO: 88.1 FL (ref 79.6–97.8)
MM INDURATION, POC: 0 MM (ref 0–5)
MONOCYTES # BLD: 0.6 K/UL (ref 0.1–1.3)
MONOCYTES NFR BLD: 15 % (ref 4–12)
NEUTS SEG # BLD: 2.8 K/UL (ref 1.7–8.2)
NEUTS SEG NFR BLD: 69 % (ref 43–78)
NRBC # BLD: 0 K/UL (ref 0–0.2)
PLATELET # BLD AUTO: 234 K/UL (ref 150–450)
PMV BLD AUTO: 9.9 FL (ref 9.4–12.3)
POTASSIUM SERPL-SCNC: 4 MMOL/L (ref 3.5–5.1)
PPD, POC: NEGATIVE
RBC # BLD AUTO: 4.3 M/UL (ref 4.23–5.6)
SERVICE CMNT-IMP: ABNORMAL
SODIUM SERPL-SCNC: 140 MMOL/L (ref 138–145)
VIT B12 SERPL-MCNC: 350 PG/ML (ref 193–986)
WBC # BLD AUTO: 4 K/UL (ref 4.3–11.1)

## 2022-06-06 PROCEDURE — 82607 VITAMIN B-12: CPT

## 2022-06-06 PROCEDURE — 36415 COLL VENOUS BLD VENIPUNCTURE: CPT

## 2022-06-06 PROCEDURE — 80048 BASIC METABOLIC PNL TOTAL CA: CPT

## 2022-06-06 PROCEDURE — 2580000003 HC RX 258: Performed by: FAMILY MEDICINE

## 2022-06-06 PROCEDURE — 6370000000 HC RX 637 (ALT 250 FOR IP): Performed by: FAMILY MEDICINE

## 2022-06-06 PROCEDURE — 82962 GLUCOSE BLOOD TEST: CPT

## 2022-06-06 PROCEDURE — 82140 ASSAY OF AMMONIA: CPT

## 2022-06-06 PROCEDURE — 6360000002 HC RX W HCPCS: Performed by: FAMILY MEDICINE

## 2022-06-06 PROCEDURE — 85025 COMPLETE CBC W/AUTO DIFF WBC: CPT

## 2022-06-06 PROCEDURE — 1100000000 HC RM PRIVATE

## 2022-06-06 PROCEDURE — 2500000003 HC RX 250 WO HCPCS: Performed by: FAMILY MEDICINE

## 2022-06-06 PROCEDURE — 2580000003 HC RX 258: Performed by: EMERGENCY MEDICINE

## 2022-06-06 RX ADMIN — SENNOSIDES AND DOCUSATE SODIUM 2 TABLET: 8.6; 5 TABLET ORAL at 08:16

## 2022-06-06 RX ADMIN — OXYCODONE HYDROCHLORIDE AND ACETAMINOPHEN 500 MG: 500 TABLET ORAL at 08:16

## 2022-06-06 RX ADMIN — FERROUS SULFATE TAB 325 MG (65 MG ELEMENTAL FE) 325 MG: 325 (65 FE) TAB at 05:31

## 2022-06-06 RX ADMIN — SODIUM CHLORIDE, PRESERVATIVE FREE 3 ML: 5 INJECTION INTRAVENOUS at 08:19

## 2022-06-06 RX ADMIN — HEPARIN SODIUM 5000 UNITS: 5000 INJECTION INTRAVENOUS; SUBCUTANEOUS at 13:50

## 2022-06-06 RX ADMIN — SODIUM CHLORIDE, PRESERVATIVE FREE 3 ML: 5 INJECTION INTRAVENOUS at 11:52

## 2022-06-06 RX ADMIN — INSULIN LISPRO 1 UNITS: 100 INJECTION, SOLUTION INTRAVENOUS; SUBCUTANEOUS at 11:51

## 2022-06-06 RX ADMIN — SODIUM CHLORIDE, PRESERVATIVE FREE 10 ML: 5 INJECTION INTRAVENOUS at 21:43

## 2022-06-06 RX ADMIN — INSULIN LISPRO 4 UNITS: 100 INJECTION, SOLUTION INTRAVENOUS; SUBCUTANEOUS at 21:13

## 2022-06-06 RX ADMIN — DIVALPROEX SODIUM 125 MG: 125 CAPSULE ORAL at 21:50

## 2022-06-06 RX ADMIN — WATER 5 MG: 1 INJECTION INTRAMUSCULAR; INTRAVENOUS; SUBCUTANEOUS at 02:29

## 2022-06-06 RX ADMIN — SODIUM CHLORIDE, PRESERVATIVE FREE 10 ML: 5 INJECTION INTRAVENOUS at 11:52

## 2022-06-06 RX ADMIN — Medication 100 MG: at 08:16

## 2022-06-06 RX ADMIN — SODIUM CHLORIDE, PRESERVATIVE FREE 3 ML: 5 INJECTION INTRAVENOUS at 21:42

## 2022-06-06 RX ADMIN — HEPARIN SODIUM 5000 UNITS: 5000 INJECTION INTRAVENOUS; SUBCUTANEOUS at 21:43

## 2022-06-06 RX ADMIN — HEPARIN SODIUM 5000 UNITS: 5000 INJECTION INTRAVENOUS; SUBCUTANEOUS at 05:31

## 2022-06-06 RX ADMIN — CEFTRIAXONE 1000 MG: 1 INJECTION, POWDER, FOR SOLUTION INTRAMUSCULAR; INTRAVENOUS at 13:49

## 2022-06-06 RX ADMIN — DIVALPROEX SODIUM 125 MG: 125 CAPSULE ORAL at 05:31

## 2022-06-06 RX ADMIN — DIVALPROEX SODIUM 125 MG: 125 CAPSULE ORAL at 16:10

## 2022-06-06 RX ADMIN — SODIUM CHLORIDE: 9 INJECTION, SOLUTION INTRAVENOUS at 11:52

## 2022-06-06 RX ADMIN — SODIUM CHLORIDE, PRESERVATIVE FREE 3 ML: 5 INJECTION INTRAVENOUS at 05:31

## 2022-06-06 ASSESSMENT — PAIN SCALES - PAIN ASSESSMENT IN ADVANCED DEMENTIA (PAINAD)
TOTALSCORE: 0
CONSOLABILITY: 0
FACIALEXPRESSION: 0
NEGVOCALIZATION: 0
BREATHING: 0
BODYLANGUAGE: 0

## 2022-06-06 ASSESSMENT — PAIN SCALES - GENERAL: PAINLEVEL_OUTOF10: 0

## 2022-06-06 NOTE — PLAN OF CARE
Problem: Discharge Planning  Goal: Discharge to home or other facility with appropriate resources  6/6/2022 0738 by Zackary Rolle RN  Outcome: Progressing  Flowsheets (Taken 6/6/2022 0403)  Discharge to home or other facility with appropriate resources: Identify barriers to discharge with patient and caregiver  6/5/2022 2024 by Dixie Donaldson RN  Outcome: Progressing  Flowsheets (Taken 6/5/2022 1929)  Discharge to home or other facility with appropriate resources: Identify barriers to discharge with patient and caregiver     Problem: Pain  Goal: Verbalizes/displays adequate comfort level or baseline comfort level  6/6/2022 0738 by Zackary Rolle RN  Outcome: Progressing  6/5/2022 2024 by Dixie Donaldson RN  Outcome: Progressing     Problem: Skin/Tissue Integrity  Goal: Absence of new skin breakdown  Description: 1. Monitor for areas of redness and/or skin breakdown  2. Assess vascular access sites hourly  3. Every 4-6 hours minimum:  Change oxygen saturation probe site  4. Every 4-6 hours:  If on nasal continuous positive airway pressure, respiratory therapy assess nares and determine need for appliance change or resting period.   6/6/2022 0738 by Zackary Rolle RN  Outcome: Progressing  6/5/2022 2024 by Dixie Donaldson RN  Outcome: Progressing     Problem: Safety - Adult  Goal: Free from fall injury  Recent Flowsheet Documentation  Taken 6/6/2022 0737 by Zackary Rolle RN  Free From Fall Injury: Instruct family/caregiver on patient safety  6/5/2022 2024 by Dixie Donaldson RN  Outcome: Progressing     Problem: ABCDS Injury Assessment  Goal: Absence of physical injury  Recent Flowsheet Documentation  Taken 6/6/2022 0737 by Zackary Rolle RN  Absence of Physical Injury: Implement safety measures based on patient assessment  6/5/2022 2024 by Dixie Donaldson RN  Outcome: Progressing     Problem: Confusion  Goal: Confusion, delirium, dementia, or psychosis is improved or at baseline  Description: INTERVENTIONS:  1. Assess for possible contributors to thought disturbance, including medications, impaired vision or hearing, underlying metabolic abnormalities, dehydration, psychiatric diagnoses, and notify attending LIP  2. [place] high risk fall precautions, as indicated  3. Provide frequent short contacts to provide reality reorientation, refocusing and direction  4. Decrease environmental stimuli, including noise as appropriate  5. Monitor and intervene to maintain adequate nutrition, hydration, elimination, sleep and activity  6. If unable to ensure safety without constant attention obtain sitter and review sitter guidelines with assigned personnel  7.  Initiate Psychosocial CNS and Spiritual Care consult, as indicated  6/5/2022 2024 by Claire Viramontes RN  Outcome: Progressing

## 2022-06-06 NOTE — PROGRESS NOTES
Progress Note    Patient: Gena Oviedo MRN: [id number]  SSN: [id number]    YOB: 1935  Age: 80 y.o. Sex: male      Admit Date: 6/4/2022    LOS: 2 days     Assessment and Plan:   60-year-old male with past medical history of diabetes, hyperlipidemia, vascular dementia, multiple myeloma in remission, bradycardia status post pacemaker, presented in setting of worsening altered mental status    1. Acute encephalopathy likely in setting of UTI and underlying dementia  -CT of the brain without acute intracranial abnormalities  -Obtain ammonia, B12 levels  -Continue ceftriaxone  -Follow urine cultures  -Symptomatic management  -Continue Depakote  -Monitor mental status  -Avoid sedatives  -Delirium precautions    2. Diabetes mellitus  -Insulin sliding scale  -Blood sugar checks before meals and at bedtime    DVT prophylaxis with heparin subcu    Subjective:   60-year-old male with past medical history of diabetes, hyperlipidemia, vascular dementia, multiple myeloma in remission, bradycardia status post pacemaker, presented in setting of worsening altered mental status. Patient seen and examined at bedside. This morning the patient awake and confused. Able to obtain too much history.     Objective:     Vitals:    06/06/22 0003 06/06/22 0427 06/06/22 0732 06/06/22 1057   BP: (!) 152/67 133/79 131/61 (!) 124/58   Pulse: 52 60 50 (!) 103   Resp: 18 18 16 16   Temp: 97.9 °F (36.6 °C) 97.5 °F (36.4 °C) 98.2 °F (36.8 °C) 97.7 °F (36.5 °C)   TempSrc: Oral Axillary Axillary Oral   SpO2: 95% 99% 95% 93%   Weight:       Height:            Intake and Output:  Current Shift: 06/06 0701 - 06/06 1900  In: -   Out: 800 [Urine:800]  Last three shifts: 06/04 1901 - 06/06 0700  In: 340 [P.O.:340]  Out: 303 [Urine:303]    ROS  Unable to obtain due to patient's condition    Physical Exam:   General: Alert but significantly confused, NAD  HEENT: NC/AT, EOM are intact  Neck: supple, no JVD  Cardiovascular: RRR, S1, S2, no murmurs  Respiratory: Lungs are clear, no wheezes or rales  Abdomen: Soft, NT, ND  Back: No CVA tenderness, no paraspinal tenderness  Extremities: LE without pedal edema, no erythema  Neuro: CN are intact, no focal deficits  Skin: no rash or ulcers    Lab/Data Review:  I have personally reviewed patients laboratory data showing  Recent Results (from the past 24 hour(s))   POCT Glucose    Collection Time: 06/05/22  3:53 PM   Result Value Ref Range    POC Glucose 204 (H) 65 - 100 mg/dL    Performed by:  AmadouPreeyaPCT    POCT Glucose    Collection Time: 06/05/22  8:54 PM   Result Value Ref Range    POC Glucose 256 (H) 65 - 100 mg/dL    Performed by: Ana M French PPD TEST IN 24 HRS    Collection Time: 06/06/22 12:00 AM   Result Value Ref Range    PPD, (POC) Negative Negative    mm Induration 0 0 - 5 mm   POCT Glucose    Collection Time: 06/06/22  7:27 AM   Result Value Ref Range    POC Glucose 186 (H) 65 - 100 mg/dL    Performed by: Jorge Warner    CBC with Auto Differential    Collection Time: 06/06/22  9:29 AM   Result Value Ref Range    WBC 4.0 (L) 4.3 - 11.1 K/uL    RBC 4.30 4.23 - 5.6 M/uL    Hemoglobin 12.4 (L) 13.6 - 17.2 g/dL    Hematocrit 37.9 (L) 41.1 - 50.3 %    MCV 88.1 79.6 - 97.8 FL    MCH 28.8 26.1 - 32.9 PG    MCHC 32.7 31.4 - 35.0 g/dL    RDW 11.9 11.9 - 14.6 %    Platelets 354 074 - 201 K/uL    MPV 9.9 9.4 - 12.3 FL    nRBC 0.00 0.0 - 0.2 K/uL    Differential Type AUTOMATED      Seg Neutrophils 69 43 - 78 %    Lymphocytes 12 (L) 13 - 44 %    Monocytes 15 (H) 4.0 - 12.0 %    Eosinophils % 2 0.5 - 7.8 %    Basophils 1 0.0 - 2.0 %    Immature Granulocytes 1 0.0 - 5.0 %    Segs Absolute 2.8 1.7 - 8.2 K/UL    Absolute Lymph # 0.5 0.5 - 4.6 K/UL    Absolute Mono # 0.6 0.1 - 1.3 K/UL    Absolute Eos # 0.1 0.0 - 0.8 K/UL    Basophils Absolute 0.0 0.0 - 0.2 K/UL    Absolute Immature Granulocyte 0.0 0.0 - 0.5 K/UL   Basic Metabolic Panel    Collection Time: 06/06/22  9:29 AM Result Value Ref Range    Sodium 140 138 - 145 mmol/L    Potassium 4.0 3.5 - 5.1 mmol/L    Chloride 104 98 - 107 mmol/L    CO2 30 21 - 32 mmol/L    Anion Gap 6 (L) 7 - 16 mmol/L    Glucose 197 (H) 65 - 100 mg/dL    BUN 15 8 - 23 MG/DL    CREATININE 0.70 (L) 0.8 - 1.5 MG/DL    GFR African American >60 >60 ml/min/1.73m2    GFR Non- >60 >60 ml/min/1.73m2    Calcium 8.7 8.3 - 10.4 MG/DL   POCT Glucose    Collection Time: 06/06/22 11:14 AM   Result Value Ref Range    POC Glucose 233 (H) 65 - 100 mg/dL    Performed by: Supriya Cooley       @[employer]@     Image:  I have personally reviewed patients imaging showing  XR PELVIS (1-2 VIEWS)   Final Result   No acute findings      XR KNEE LEFT (1-2 VIEWS)   Final Result   No acute findings         CT Head W/O Contrast   Final Result   Chronic appearing changes without acute intracranial abnormality.             XR CHEST PORTABLE   Final Result           Current Facility-Administered Medications   Medication Dose Route Frequency Provider Last Rate Last Admin    sodium chloride flush 0.9 % injection 3 mL  3 mL IntraVENous Q8H Valerie Mckeon III, MD   3 mL at 06/06/22 1152    ascorbic acid (VITAMIN C) tablet 500 mg  500 mg Oral Daily Socrates Linker, DO   500 mg at 06/06/22 0816    divalproex (DEPAKOTE SPRINKLE) capsule 125 mg  125 mg Oral Q8H Socrates Linker, DO   125 mg at 06/06/22 0531    ferrous sulfate (IRON 325) tablet 325 mg  325 mg Oral QAM AC Socrates Linker, DO   325 mg at 06/06/22 0531    sennosides-docusate sodium (SENOKOT-S) 8.6-50 MG tablet 2 tablet  2 tablet Oral Daily Socrates Linker, DO   2 tablet at 06/06/22 0816    vitamin B-6 (PYRIDOXINE) tablet 100 mg  100 mg Oral Daily Socrates Linker, DO   100 mg at 06/06/22 0816    cefTRIAXone (ROCEPHIN) 1000 mg IVPB in NS 50ml minibag  1,000 mg IntraVENous Q24H Socrates Linker, DO   Stopped at 06/05/22 1703    sodium chloride flush 0.9 % injection 5-40 mL  5-40 mL IntraVENous 2 times per day [facility], [facility]   10 mL at 06/06/22 1152    sodium chloride flush 0.9 % injection 5-40 mL  5-40 mL IntraVENous PRN [facility], DO        0.9 % sodium chloride infusion   IntraVENous PRN [facility], DO        ondansetron (ZOFRAN-ODT) disintegrating tablet 4 mg  4 mg Oral Q8H PRN [facility], DO        Or    ondansetron [facility]) injection 4 mg  4 mg IntraVENous Q6H PRN [facility], DO        acetaminophen (TYLENOL) tablet 650 mg  650 mg Oral Q6H PRN [facility], DO        Or    acetaminophen (TYLENOL) suppository 650 mg  650 mg Rectal Q6H PRN [facility], DO        heparin (porcine) injection 5,000 Units  5,000 Units SubCUTAneous 3 times per day [facility], DO   5,000 Units at 06/06/22 0531    insulin lispro (HUMALOG) injection vial 0-4 Units  0-4 Units SubCUTAneous TID WC [facility], DO   1 Units at 06/06/22 1151    insulin lispro (HUMALOG) injection vial 0-4 Units  0-4 Units SubCUTAneous Nightly [facility], DO        0.9 % sodium chloride infusion   IntraVENous Continuous [facility], DO 75 mL/hr at 06/06/22 1152 New Bag at 06/06/22 1152        Hospital problems     Patient Active Problem List   Diagnosis    [facility] dementia [facility]    Diabetes mellitus type 2, controlled ([facility]    Closed displaced fracture of left femoral neck (HCC)    Symptomatic bradycardia    Acute metabolic encephalopathy    Complicated UTI (urinary tract infection)        I have reviewed, updated, and verified this note's content and spent 36 minutes of my 40 minutes visit performing counseling and coordination of care regarding medical management.        Signed By: Jess Gutierrez MD     June 6, 2022

## 2022-06-06 NOTE — PLAN OF CARE
Problem: Discharge Planning  Goal: Discharge to home or other facility with appropriate resources  6/5/2022 2024 by Charles Bello RN  Outcome: Progressing  Flowsheets (Taken 6/5/2022 1929)  Discharge to home or other facility with appropriate resources: Identify barriers to discharge with patient and caregiver  6/5/2022 0804 by Juan Carlos Velásquez RN  Outcome: Tripp Larson (Taken 6/5/2022 6368)  Discharge to home or other facility with appropriate resources: Identify barriers to discharge with patient and caregiver     Problem: Pain  Goal: Verbalizes/displays adequate comfort level or baseline comfort level  6/5/2022 2024 by Charles Bello RN  Outcome: Progressing  6/5/2022 0804 by Juan Carlos Velásquez RN  Outcome: Progressing     Problem: Skin/Tissue Integrity  Goal: Absence of new skin breakdown  Description: 1. Monitor for areas of redness and/or skin breakdown  2. Assess vascular access sites hourly  3. Every 4-6 hours minimum:  Change oxygen saturation probe site  4. Every 4-6 hours:  If on nasal continuous positive airway pressure, respiratory therapy assess nares and determine need for appliance change or resting period.   6/5/2022 2024 by Charles Bello RN  Outcome: Progressing  6/5/2022 0804 by Juan Carlos Velásquez RN  Outcome: Progressing     Problem: Safety - Adult  Goal: Free from fall injury  6/5/2022 2024 by Charles Bello RN  Outcome: Progressing  6/5/2022 0804 by Juan Carlos Velásquez RN  Outcome: Progressing  Flowsheets  Taken 6/5/2022 0803  Free From Fall Injury: Instruct family/caregiver on patient safety  Taken 6/5/2022 0801  Free From Fall Injury: Instruct family/caregiver on patient safety     Problem: ABCDS Injury Assessment  Goal: Absence of physical injury  6/5/2022 2024 by Charles Bello RN  Outcome: Progressing  6/5/2022 0804 by Juan Carlos Velásquez RN  Outcome: Progressing  Flowsheets (Taken 6/5/2022 0801)  Absence of Physical Injury: Implement safety measures based on patient assessment     Problem: Confusion  Goal: Confusion, delirium, dementia, or psychosis is improved or at baseline  Description: INTERVENTIONS:  1. Assess for possible contributors to thought disturbance, including medications, impaired vision or hearing, underlying metabolic abnormalities, dehydration, psychiatric diagnoses, and notify attending LIP  2. [place] high risk fall precautions, as indicated  3. Provide frequent short contacts to provide reality reorientation, refocusing and direction  4. Decrease environmental stimuli, including noise as appropriate  5. Monitor and intervene to maintain adequate nutrition, hydration, elimination, sleep and activity  6. If unable to ensure safety without constant attention obtain sitter and review sitter guidelines with assigned personnel  7.  Initiate Psychosocial CNS and Spiritual Care consult, as indicated  6/5/2022 2024 by Dixie Donaldson, RN  Outcome: Progressing  6/5/2022 0804 by Zackary Rolle RN  Outcome: Progressing

## 2022-06-06 NOTE — CARE COORDINATION
Chart reviewed by CM. CM is unable to complete assessment with patient this morning, due to mental status. Assessment information obtained from daughter Abbey Black [phone]. Patient resides at [facility] in [address]. Patient requires assistance with completing ADL's at baseline. According to daughter, the patient is able to transfer from bed to wheelchair. Patient is non ambulatory/wheelchair bound. Patient receives 3 meals a day and medications are managed by staff at [facility]. Discharge planning: Therapy consult ordered. Patient has a history of STR with [facility]. Per Abbey Black, inhouse therapy is available at patient's living facility. Daughter anticipates the patient returning to Memory Care Unit with inhouse therapy. CM to speak with , to confirm patient can return at time of discharge. No CM needs voiced at this time. CM continues to follow care plan. 06/06/22 1056   Service Assessment   Patient Orientation Unable to Assess   Cognition Dementia / Early Alzheimer's   History Provided By Child/Family  (Patient's Daughter Abbey Black [phone].)   [facility] [employer]   PCP Verified by CM Yes   Current Functional Level Assistance with the following:;Bathing;Dressing; Toileting   Ability to make needs known: Good   Social/Functional History   Type of Home Assisted living  ([facility]   Ambulation Assistance Needs assistance   Transfer Assistance Independent  (Prior level- Independent.)   Discharge Planning   Type of Residence Assisted living  ([facility].  [phone])   Patient expects to be discharged to: Assisted living  ((Memory Care Unit))   Services At/After Discharge   Transition of Care Consult (CM Consult) Assisted Living  ([facility]   Condition of Participation: Discharge Planning   The Plan for Transition of Care is related to the following treatment goals: Return to prior level of functioning.

## 2022-06-07 LAB
ANION GAP SERPL CALC-SCNC: 6 MMOL/L (ref 7–16)
BACTERIA SPEC CULT: ABNORMAL
BACTERIA SPEC CULT: ABNORMAL
BASOPHILS # BLD: 0 K/UL (ref 0–0.2)
BASOPHILS NFR BLD: 1 % (ref 0–2)
BUN SERPL-MCNC: 17 MG/DL (ref 8–23)
CALCIUM SERPL-MCNC: 8.7 MG/DL (ref 8.3–10.4)
CHLORIDE SERPL-SCNC: 107 MMOL/L (ref 98–107)
CO2 SERPL-SCNC: 30 MMOL/L (ref 21–32)
CREAT SERPL-MCNC: 0.7 MG/DL (ref 0.8–1.5)
DIFFERENTIAL METHOD BLD: ABNORMAL
EOSINOPHIL # BLD: 0.1 K/UL (ref 0–0.8)
EOSINOPHIL NFR BLD: 3 % (ref 0.5–7.8)
ERYTHROCYTE [DISTWIDTH] IN BLOOD BY AUTOMATED COUNT: 12 % (ref 11.9–14.6)
GLUCOSE BLD STRIP.AUTO-MCNC: 132 MG/DL (ref 65–100)
GLUCOSE BLD STRIP.AUTO-MCNC: 160 MG/DL (ref 65–100)
GLUCOSE BLD STRIP.AUTO-MCNC: 248 MG/DL (ref 65–100)
GLUCOSE BLD STRIP.AUTO-MCNC: 263 MG/DL (ref 65–100)
GLUCOSE SERPL-MCNC: 110 MG/DL (ref 65–100)
HCT VFR BLD AUTO: 36.5 % (ref 41.1–50.3)
HGB BLD-MCNC: 12 G/DL (ref 13.6–17.2)
IMM GRANULOCYTES # BLD AUTO: 0 K/UL (ref 0–0.5)
IMM GRANULOCYTES NFR BLD AUTO: 1 % (ref 0–5)
LYMPHOCYTES # BLD: 0.7 K/UL (ref 0.5–4.6)
LYMPHOCYTES NFR BLD: 17 % (ref 13–44)
MCH RBC QN AUTO: 29 PG (ref 26.1–32.9)
MCHC RBC AUTO-ENTMCNC: 32.9 G/DL (ref 31.4–35)
MCV RBC AUTO: 88.2 FL (ref 79.6–97.8)
MONOCYTES # BLD: 0.5 K/UL (ref 0.1–1.3)
MONOCYTES NFR BLD: 14 % (ref 4–12)
NEUTS SEG # BLD: 2.6 K/UL (ref 1.7–8.2)
NEUTS SEG NFR BLD: 64 % (ref 43–78)
NRBC # BLD: 0 K/UL (ref 0–0.2)
PLATELET # BLD AUTO: 242 K/UL (ref 150–450)
PMV BLD AUTO: 9.6 FL (ref 9.4–12.3)
POTASSIUM SERPL-SCNC: 3.7 MMOL/L (ref 3.5–5.1)
RBC # BLD AUTO: 4.14 M/UL (ref 4.23–5.6)
SERVICE CMNT-IMP: ABNORMAL
SODIUM SERPL-SCNC: 143 MMOL/L (ref 138–145)
WBC # BLD AUTO: 3.9 K/UL (ref 4.3–11.1)

## 2022-06-07 PROCEDURE — 1100000000 HC RM PRIVATE

## 2022-06-07 PROCEDURE — 6370000000 HC RX 637 (ALT 250 FOR IP): Performed by: FAMILY MEDICINE

## 2022-06-07 PROCEDURE — 80048 BASIC METABOLIC PNL TOTAL CA: CPT

## 2022-06-07 PROCEDURE — 36415 COLL VENOUS BLD VENIPUNCTURE: CPT

## 2022-06-07 PROCEDURE — 2580000003 HC RX 258: Performed by: EMERGENCY MEDICINE

## 2022-06-07 PROCEDURE — 6370000000 HC RX 637 (ALT 250 FOR IP): Performed by: NURSE PRACTITIONER

## 2022-06-07 PROCEDURE — 2580000003 HC RX 258: Performed by: FAMILY MEDICINE

## 2022-06-07 PROCEDURE — 85025 COMPLETE CBC W/AUTO DIFF WBC: CPT

## 2022-06-07 PROCEDURE — 6360000002 HC RX W HCPCS: Performed by: FAMILY MEDICINE

## 2022-06-07 PROCEDURE — 82962 GLUCOSE BLOOD TEST: CPT

## 2022-06-07 RX ORDER — CIPROFLOXACIN 500 MG/1
500 TABLET, FILM COATED ORAL EVERY 12 HOURS SCHEDULED
Status: DISCONTINUED | OUTPATIENT
Start: 2022-06-07 | End: 2022-06-09 | Stop reason: HOSPADM

## 2022-06-07 RX ORDER — LACTULOSE 10 G/15ML
20 SOLUTION ORAL 2 TIMES DAILY
Status: COMPLETED | OUTPATIENT
Start: 2022-06-07 | End: 2022-06-08

## 2022-06-07 RX ADMIN — SENNOSIDES AND DOCUSATE SODIUM 2 TABLET: 8.6; 5 TABLET ORAL at 09:57

## 2022-06-07 RX ADMIN — OXYCODONE HYDROCHLORIDE AND ACETAMINOPHEN 500 MG: 500 TABLET ORAL at 09:57

## 2022-06-07 RX ADMIN — DIVALPROEX SODIUM 125 MG: 125 CAPSULE ORAL at 14:30

## 2022-06-07 RX ADMIN — HEPARIN SODIUM 5000 UNITS: 5000 INJECTION INTRAVENOUS; SUBCUTANEOUS at 21:35

## 2022-06-07 RX ADMIN — FERROUS SULFATE TAB 325 MG (65 MG ELEMENTAL FE) 325 MG: 325 (65 FE) TAB at 05:41

## 2022-06-07 RX ADMIN — HEPARIN SODIUM 5000 UNITS: 5000 INJECTION INTRAVENOUS; SUBCUTANEOUS at 05:40

## 2022-06-07 RX ADMIN — LACTULOSE 20 G: 20 SOLUTION ORAL at 14:30

## 2022-06-07 RX ADMIN — Medication 100 MG: at 09:57

## 2022-06-07 RX ADMIN — HEPARIN SODIUM 5000 UNITS: 5000 INJECTION INTRAVENOUS; SUBCUTANEOUS at 14:30

## 2022-06-07 RX ADMIN — SODIUM CHLORIDE, PRESERVATIVE FREE 3 ML: 5 INJECTION INTRAVENOUS at 05:39

## 2022-06-07 RX ADMIN — INSULIN LISPRO 1 UNITS: 100 INJECTION, SOLUTION INTRAVENOUS; SUBCUTANEOUS at 17:26

## 2022-06-07 RX ADMIN — DIVALPROEX SODIUM 125 MG: 125 CAPSULE ORAL at 05:41

## 2022-06-07 RX ADMIN — CIPROFLOXACIN 500 MG: 500 TABLET, FILM COATED ORAL at 20:42

## 2022-06-07 RX ADMIN — DIVALPROEX SODIUM 125 MG: 125 CAPSULE ORAL at 23:12

## 2022-06-07 RX ADMIN — SODIUM CHLORIDE, PRESERVATIVE FREE 10 ML: 5 INJECTION INTRAVENOUS at 20:43

## 2022-06-07 RX ADMIN — LACTULOSE 20 G: 20 SOLUTION ORAL at 20:42

## 2022-06-07 ASSESSMENT — PAIN SCALES - GENERAL: PAINLEVEL_OUTOF10: 0

## 2022-06-07 ASSESSMENT — PAIN SCALES - PAIN ASSESSMENT IN ADVANCED DEMENTIA (PAINAD)
TOTALSCORE: 0
BREATHING: 0
BODYLANGUAGE: 0
CONSOLABILITY: 0
FACIALEXPRESSION: 0
NEGVOCALIZATION: 0

## 2022-06-07 NOTE — CARE COORDINATION
REGGIE spoke with Patience with Patient Care at [place] [address]. Patience confirmed primary nurse will need to provide report to BROOKE Cervantes [phone] when the patient is approaching discharge, to determine if patient is cleared to return. Clinicals at discharge can be faxed to [phone]) [phone]. REGGIE was receptive to this information. REGGIE continues to follow care plan.

## 2022-06-07 NOTE — PROGRESS NOTES
Hourly rounds completed. Pt is alert to self. Pt isn't in no apparent distress at this time. Bed is in low locked position, call light and bedside table within reach.  Will continue to monitor and give report to the oncoming nurse

## 2022-06-07 NOTE — PROGRESS NOTES
Comprehensive Nutrition Assessment    Type and Reason for Visit: Initial,Positive Nutrition Screen  Malnutrition Screening Tool: Malnutrition Screen  Have you recently lost weight without trying?: 0 to 1 pound (0 points)  Have you been eating poorly because of a decreased appetite?: Yes (1 point)  Malnutrition Screening Tool Score: 1 and Best Practice Alert for Pressure Injury Stage 2 or greater    Nutrition Recommendations/Plan:   Meals and Snacks:  Diet: Continue current order  Feed only when alert. Nutrition Supplement Therapy:   Medical food supplement therapy:  Initiate Glucerna Shake three times per day (this provides 220 kcal and 10 grams protein per bottle)      Malnutrition Assessment:  Malnutrition Status: At risk for malnutrition (Comment) (acute decline in oral intake)    Nutrition Assessment:  Nutrition History: Hx per daughter at bedside, pt form memory care unit. He self feeds and cleans his plate at baseline. Daughter visits daily. Daughter indicates no known specific wt loss pta. NFPE deferred as pt somnolent. Do You Have Any Cultural, Yazdanism, or Ethnic Food Preferences?: No   Nutrition Background:   PMH remarkable for diabetes, hyperlipidemia, vascular dementia, multiple myeloma in remission, bradycardia status post pacemaker. Admitted with acute encephalopathy likely in setting of UTI and underlying dementia. Nutrition Interval:  Potential wt loss as below unable to validate as current wt stated. Pt noted to have thin appearance. He doesn't arouse for RD. At first attempt to see this am he was alert and eating with nursing student. He ate the majority of am meal, at noon meal he only accepted bites. Ammonia elevated, lactulose times one does thus far. Discussed with Dionte Joseph RN. Current intake trend will be inadequate to meet needs however this is significantly different than his baseline intake. +perineum wound per primary nurse.       Current Nutrition

## 2022-06-07 NOTE — PROGRESS NOTES
Hospitalist Progress Note   Admit Date:  2022 12:46 PM   Name:  Ashley Flores   Age:  80 y.o. Sex:  male  :  1935   MRN:  [id number]   Room:  [facility]/    Presenting Complaint: Fatigue    Reason(s) for Admission: Delirium [R41.0]  Acute cystitis without hematuria [N30.00]  Dementia without behavioral disturbance, unspecified dementia type ([facility] [address] [U26.98]  Acute metabolic encephalopathy [X11.85]     Hospital Course & Interval History:   25-year-old male with past medical history of diabetes, hyperlipidemia, vascular dementia, multiple myeloma in remission, bradycardia status post pacemaker, presented in setting of worsening altered mental status       Subjective/24hr Events (22): Unable to determine due to mentation. No visualized distress    ROS:  10 systems reviewed and negative except as noted above. Assessment & Plan:   Acute encephalopathy likely in setting of UTI and underlying dementia  -CT of the brain without acute intracranial abnormalities  -Ammonia level 60 (likely related to UTI), B12 level 350  -Lactulose  -Changing to oral Cipro per susceptibility results in anticipation of discharge  -Continue Depakote  -Monitor mental status  -Avoid sedatives  -Delirium precautions    UTI  -Cipro per UCx results     Diabetes mellitus  -Insulin sliding scale  -Blood sugar checks before meals and at bedtime       Discharge Planning: Will need to ne mitt free x 48 hours for discharge    Diet:  ADULT DIET;  Dysphagia - Soft and Bite Sized  DVT PPx: Heparin `  Code status: DNR      Objective:   Patient Vitals for the past 24 hrs:   Temp Pulse Resp BP SpO2   22 1130 97.7 °F (36.5 °C) 60 18 103/65 93 %   22 0752 98.2 °F (36.8 °C) 56 16 131/62 94 %   22 0459 97.7 °F (36.5 °C) 55 18 (!) 129/57 100 %   22 2308 97.7 °F (36.5 °C) 54 16 (!) 133/56 100 %   22 1948 98.6 °F (37 °C) 72 18 123/60 97 %   22 1551 97.5 °F (36.4 °C) 52 18 (!) 111/57 96 %       Estimated body mass index is 23.49 kg/m² as calculated from the following:    Height as of this encounter: 5' 7\" (1.702 m). Weight as of this encounter: 150 lb (68 kg). Intake/Output Summary (Last 24 hours) at 6/7/2022 1218  Last data filed at 6/6/2022 2258  Gross per 24 hour   Intake --   Output 1680 ml   Net -1680 ml         Physical Exam:     Blood pressure 103/65, pulse 60, temperature 97.7 °F (36.5 °C), resp. rate 18, height 5' 7\" (1.702 m), weight 150 lb (68 kg), SpO2 93 %. General:    No overt distress  Head:  Normocephalic, atraumatic  Eyes:  Sclerae appear normal.  Pupils equally round. ENT:  Nares appear normal, no drainage. Moist oral mucosa  Neck:  No restricted ROM. Trachea midline   CV:   RRR. No m/r/g. No jugular venous distension. Lungs:   CTAB. No wheezing, rhonchi, or rales. Respirations even, unlabored  Abdomen: Bowel sounds present. Soft, nontender, nondistended. Extremities: No cyanosis or clubbing. No edema  Skin:     No rashes and normal coloration. Warm and dry. Neuro:  CN II-XII grossly intact. A&Ox 1  Psych:  Normal mood and affect. I have reviewed ordered lab tests and independently visualized imaging below:    Recent Labs:  Recent Results (from the past 48 hour(s))   POCT Glucose    Collection Time: 06/05/22  3:53 PM   Result Value Ref Range    POC Glucose 204 (H) 65 - 100 mg/dL    Performed by:  WallacePreeyaPCT    POCT Glucose    Collection Time: 06/05/22  8:54 PM   Result Value Ref Range    POC Glucose 256 (H) 65 - 100 mg/dL    Performed by: Ward Altamirano PPD TEST IN 24 HRS    Collection Time: 06/06/22 12:00 AM   Result Value Ref Range    PPD, (POC) Negative Negative    mm Induration 0 0 - 5 mm   POCT Glucose    Collection Time: 06/06/22  7:27 AM   Result Value Ref Range    POC Glucose 186 (H) 65 - 100 mg/dL    Performed by: Colletta Kingfisher    CBC with Auto Differential    Collection Time: 06/06/22  9:29 AM   Result Value Ref Range    WBC 4.0 (L) 4.3 - 11.1 K/uL    RBC 4.30 4.23 - 5.6 M/uL    Hemoglobin 12.4 (L) 13.6 - 17.2 g/dL    Hematocrit 37.9 (L) 41.1 - 50.3 %    MCV 88.1 79.6 - 97.8 FL    MCH 28.8 26.1 - 32.9 PG    MCHC 32.7 31.4 - 35.0 g/dL    RDW 11.9 11.9 - 14.6 %    Platelets 973 060 - 311 K/uL    MPV 9.9 9.4 - 12.3 FL    nRBC 0.00 0.0 - 0.2 K/uL    Differential Type AUTOMATED      Seg Neutrophils 69 43 - 78 %    Lymphocytes 12 (L) 13 - 44 %    Monocytes 15 (H) 4.0 - 12.0 %    Eosinophils % 2 0.5 - 7.8 %    Basophils 1 0.0 - 2.0 %    Immature Granulocytes 1 0.0 - 5.0 %    Segs Absolute 2.8 1.7 - 8.2 K/UL    Absolute Lymph # 0.5 0.5 - 4.6 K/UL    Absolute Mono # 0.6 0.1 - 1.3 K/UL    Absolute Eos # 0.1 0.0 - 0.8 K/UL    Basophils Absolute 0.0 0.0 - 0.2 K/UL    Absolute Immature Granulocyte 0.0 0.0 - 0.5 K/UL   Basic Metabolic Panel    Collection Time: 06/06/22  9:29 AM   Result Value Ref Range    Sodium 140 138 - 145 mmol/L    Potassium 4.0 3.5 - 5.1 mmol/L    Chloride 104 98 - 107 mmol/L    CO2 30 21 - 32 mmol/L    Anion Gap 6 (L) 7 - 16 mmol/L    Glucose 197 (H) 65 - 100 mg/dL    BUN 15 8 - 23 MG/DL    CREATININE 0.70 (L) 0.8 - 1.5 MG/DL    GFR African American >60 >60 ml/min/1.73m2    GFR Non- >60 >60 ml/min/1.73m2    Calcium 8.7 8.3 - 10.4 MG/DL   Vitamin B12    Collection Time: 06/06/22  9:29 AM   Result Value Ref Range    Vitamin B-12 350 193 - 986 pg/mL   POCT Glucose    Collection Time: 06/06/22 11:14 AM   Result Value Ref Range    POC Glucose 233 (H) 65 - 100 mg/dL    Performed by: Gilbert    POCT Glucose    Collection Time: 06/06/22  3:57 PM   Result Value Ref Range    POC Glucose 186 (H) 65 - 100 mg/dL    Performed by: Supriya    Ammonia    Collection Time: 06/06/22  4:13 PM   Result Value Ref Range    Ammonia 80 (H) 11 - 32 UMOL/L   POCT Glucose    Collection Time: 06/06/22  8:47 PM   Result Value Ref Range    POC Glucose 306 (H) 65 - 100 mg/dL    Performed by: Caroline Lloyd    CBC with Auto Differential    Collection Time: 06/07/22  4:56 AM   Result Value Ref Range    WBC 3.9 (L) 4.3 - 11.1 K/uL    RBC 4.14 (L) 4.23 - 5.6 M/uL    Hemoglobin 12.0 (L) 13.6 - 17.2 g/dL    Hematocrit 36.5 (L) 41.1 - 50.3 %    MCV 88.2 79.6 - 97.8 FL    MCH 29.0 26.1 - 32.9 PG    MCHC 32.9 31.4 - 35.0 g/dL    RDW 12.0 11.9 - 14.6 %    Platelets 630 316 - 799 K/uL    MPV 9.6 9.4 - 12.3 FL    nRBC 0.00 0.0 - 0.2 K/uL    Differential Type AUTOMATED      Seg Neutrophils 64 43 - 78 %    Lymphocytes 17 13 - 44 %    Monocytes 14 (H) 4.0 - 12.0 %    Eosinophils % 3 0.5 - 7.8 %    Basophils 1 0.0 - 2.0 %    Immature Granulocytes 1 0.0 - 5.0 %    Segs Absolute 2.6 1.7 - 8.2 K/UL    Absolute Lymph # 0.7 0.5 - 4.6 K/UL    Absolute Mono # 0.5 0.1 - 1.3 K/UL    Absolute Eos # 0.1 0.0 - 0.8 K/UL    Basophils Absolute 0.0 0.0 - 0.2 K/UL    Absolute Immature Granulocyte 0.0 0.0 - 0.5 K/UL   Basic Metabolic Panel    Collection Time: 06/07/22  4:56 AM   Result Value Ref Range    Sodium 143 138 - 145 mmol/L    Potassium 3.7 3.5 - 5.1 mmol/L    Chloride 107 98 - 107 mmol/L    CO2 30 21 - 32 mmol/L    Anion Gap 6 (L) 7 - 16 mmol/L    Glucose 110 (H) 65 - 100 mg/dL    BUN 17 8 - 23 MG/DL    CREATININE 0.70 (L) 0.8 - 1.5 MG/DL    GFR African American >60 >60 ml/min/1.73m2    GFR Non- >60 >60 ml/min/1.73m2    Calcium 8.7 8.3 - 10.4 MG/DL   POCT Glucose    Collection Time: 06/07/22  7:49 AM   Result Value Ref Range    POC Glucose 132 (H) 65 - 100 mg/dL    Performed by: [facility] MANJU    POCT Glucose    Collection Time: 06/07/22 11:32 AM   Result Value Ref Range    POC Glucose 160 (H) 65 - 100 mg/dL    Performed by: [facility] MANJU        [unfilled]    Other Studies:  [unfilled]    Current Meds:  Current Facility-Administered Medications   Medication Dose Route Frequency    sodium chloride flush 0.9 % injection 3 mL  3 mL IntraVENous Q8H    ascorbic acid (VITAMIN C) tablet 500 mg  500 mg Oral Daily    divalproex (DEPAKOTE SPRINKLE) capsule 125 mg  125 mg Oral Q8H    ferrous sulfate (IRON 325) tablet 325 mg  325 mg Oral QAM AC    sennosides-docusate sodium (SENOKOT-S) 8.6-50 MG tablet 2 tablet  2 tablet Oral Daily    vitamin B-6 (PYRIDOXINE) tablet 100 mg  100 mg Oral Daily    cefTRIAXone (ROCEPHIN) 1000 mg IVPB in NS 50ml minibag  1,000 mg IntraVENous Q24H    sodium chloride flush 0.9 % injection 5-40 mL  5-40 mL IntraVENous 2 times per day    sodium chloride flush 0.9 % injection 5-40 mL  5-40 mL IntraVENous PRN    0.9 % sodium chloride infusion   IntraVENous PRN    ondansetron (ZOFRAN-ODT) disintegrating tablet 4 mg  4 mg Oral Q8H PRN    Or    ondansetron (ZOFRAN) injection 4 mg  4 mg IntraVENous Q6H PRN    acetaminophen (TYLENOL) tablet 650 mg  650 mg Oral Q6H PRN    Or    acetaminophen (TYLENOL) suppository 650 mg  650 mg Rectal Q6H PRN    heparin (porcine) injection 5,000 Units  5,000 Units SubCUTAneous 3 times per day    insulin lispro (HUMALOG) injection vial 0-4 Units  0-4 Units SubCUTAneous TID WC    insulin lispro (HUMALOG) injection vial 0-4 Units  0-4 Units SubCUTAneous Nightly    0.9 % sodium chloride infusion   IntraVENous Continuous       Signed:  SALLIE Pena - CNP    Part of this note may have been written by using a voice dictation software. The note has been proof read but may still contain some grammatical/other typographical errors.

## 2022-06-07 NOTE — PROGRESS NOTES
Hourly rounds completed. Patient is alert to person. Takes meds crushed in applesauce. Pt denies needs at this time. Bed in low position, locked and call light/personal items within reach. Will continue to monitor and report to night shift nurse.

## 2022-06-07 NOTE — PLAN OF CARE
Problem: Discharge Planning  Goal: Discharge to home or other facility with appropriate resources  6/6/2022 2057 by Radha Campbell RN  Outcome: Progressing  6/6/2022 0738 by Boone Valdez RN  Outcome: Progressing  Flowsheets (Taken 6/6/2022 3377)  Discharge to home or other facility with appropriate resources: Identify barriers to discharge with patient and caregiver     Problem: Pain  Goal: Verbalizes/displays adequate comfort level or baseline comfort level  6/6/2022 2057 by Radha Campbell RN  Outcome: Progressing  6/6/2022 0738 by Boone Valdez RN  Outcome: Progressing     Problem: Skin/Tissue Integrity  Goal: Absence of new skin breakdown  Description: 1. Monitor for areas of redness and/or skin breakdown  2. Assess vascular access sites hourly  3. Every 4-6 hours minimum:  Change oxygen saturation probe site  4. Every 4-6 hours:  If on nasal continuous positive airway pressure, respiratory therapy assess nares and determine need for appliance change or resting period. 6/6/2022 2057 by Radha Campbell RN  Outcome: Progressing  6/6/2022 0738 by Boone Valdez RN  Outcome: Progressing     Problem: Safety - Adult  Goal: Free from fall injury  Outcome: Progressing  Flowsheets (Taken 6/6/2022 0737 by Boone Valdez RN)  Free From Fall Injury: Instruct family/caregiver on patient safety     Problem: ABCDS Injury Assessment  Goal: Absence of physical injury  Outcome: Progressing  Flowsheets (Taken 6/6/2022 0737 by Boone Valdez RN)  Absence of Physical Injury: Implement safety measures based on patient assessment     Problem: Confusion  Goal: Confusion, delirium, dementia, or psychosis is improved or at baseline  Description: INTERVENTIONS:  1. Assess for possible contributors to thought disturbance, including medications, impaired vision or hearing, underlying metabolic abnormalities, dehydration, psychiatric diagnoses, and notify attending LIP  2.  Campbell high risk fall precautions, as indicated  3. Provide frequent short contacts to provide reality reorientation, refocusing and direction  4. Decrease environmental stimuli, including noise as appropriate  5. Monitor and intervene to maintain adequate nutrition, hydration, elimination, sleep and activity  6. If unable to ensure safety without constant attention obtain sitter and review sitter guidelines with assigned personnel  7.  Initiate Psychosocial CNS and Spiritual Care consult, as indicated  Outcome: Progressing  Flowsheets (Taken 6/6/2022 1948)  Effect of thought disturbance (confusion, delirium, dementia, or psychosis) are managed with adequate functional status:   Assess for contributors to thought disturbance, including medications, impaired vision or hearing, underlying metabolic abnormalities, dehydration, psychiatric diagnoses, notify [place] high risk fall precautions, as indicated   Provide frequent short contacts to provide reality reorientation, refocusing and direction

## 2022-06-08 LAB
ANION GAP SERPL CALC-SCNC: 5 MMOL/L (ref 7–16)
BASOPHILS # BLD: 0 K/UL (ref 0–0.2)
BASOPHILS NFR BLD: 1 % (ref 0–2)
BUN SERPL-MCNC: 16 MG/DL (ref 8–23)
CALCIUM SERPL-MCNC: 8.2 MG/DL (ref 8.3–10.4)
CHLORIDE SERPL-SCNC: 108 MMOL/L (ref 98–107)
CO2 SERPL-SCNC: 29 MMOL/L (ref 21–32)
CREAT SERPL-MCNC: 0.7 MG/DL (ref 0.8–1.5)
DIFFERENTIAL METHOD BLD: ABNORMAL
EOSINOPHIL # BLD: 0.1 K/UL (ref 0–0.8)
EOSINOPHIL NFR BLD: 2 % (ref 0.5–7.8)
ERYTHROCYTE [DISTWIDTH] IN BLOOD BY AUTOMATED COUNT: 12 % (ref 11.9–14.6)
GLUCOSE BLD STRIP.AUTO-MCNC: 171 MG/DL (ref 65–100)
GLUCOSE BLD STRIP.AUTO-MCNC: 196 MG/DL (ref 65–100)
GLUCOSE BLD STRIP.AUTO-MCNC: 229 MG/DL (ref 65–100)
GLUCOSE BLD STRIP.AUTO-MCNC: 303 MG/DL (ref 65–100)
GLUCOSE BLD STRIP.AUTO-MCNC: 326 MG/DL (ref 65–100)
GLUCOSE SERPL-MCNC: 188 MG/DL (ref 65–100)
HCT VFR BLD AUTO: 34.2 % (ref 41.1–50.3)
HGB BLD-MCNC: 11.3 G/DL (ref 13.6–17.2)
IMM GRANULOCYTES # BLD AUTO: 0 K/UL (ref 0–0.5)
IMM GRANULOCYTES NFR BLD AUTO: 1 % (ref 0–5)
LYMPHOCYTES # BLD: 0.8 K/UL (ref 0.5–4.6)
LYMPHOCYTES NFR BLD: 22 % (ref 13–44)
MCH RBC QN AUTO: 29.1 PG (ref 26.1–32.9)
MCHC RBC AUTO-ENTMCNC: 33 G/DL (ref 31.4–35)
MCV RBC AUTO: 88.1 FL (ref 79.6–97.8)
MONOCYTES # BLD: 0.4 K/UL (ref 0.1–1.3)
MONOCYTES NFR BLD: 11 % (ref 4–12)
NEUTS SEG # BLD: 2.3 K/UL (ref 1.7–8.2)
NEUTS SEG NFR BLD: 63 % (ref 43–78)
NRBC # BLD: 0 K/UL (ref 0–0.2)
PLATELET # BLD AUTO: 245 K/UL (ref 150–450)
PMV BLD AUTO: 9.8 FL (ref 9.4–12.3)
POTASSIUM SERPL-SCNC: 3.7 MMOL/L (ref 3.5–5.1)
RBC # BLD AUTO: 3.88 M/UL (ref 4.23–5.6)
SARS-COV-2 RDRP RESP QL NAA+PROBE: NOT DETECTED
SERVICE CMNT-IMP: ABNORMAL
SODIUM SERPL-SCNC: 142 MMOL/L (ref 138–145)
SOURCE: NORMAL
WBC # BLD AUTO: 3.6 K/UL (ref 4.3–11.1)

## 2022-06-08 PROCEDURE — 85025 COMPLETE CBC W/AUTO DIFF WBC: CPT

## 2022-06-08 PROCEDURE — 6370000000 HC RX 637 (ALT 250 FOR IP): Performed by: NURSE PRACTITIONER

## 2022-06-08 PROCEDURE — 82962 GLUCOSE BLOOD TEST: CPT

## 2022-06-08 PROCEDURE — 80048 BASIC METABOLIC PNL TOTAL CA: CPT

## 2022-06-08 PROCEDURE — 2580000003 HC RX 258: Performed by: FAMILY MEDICINE

## 2022-06-08 PROCEDURE — 6370000000 HC RX 637 (ALT 250 FOR IP): Performed by: FAMILY MEDICINE

## 2022-06-08 PROCEDURE — 2580000003 HC RX 258: Performed by: EMERGENCY MEDICINE

## 2022-06-08 PROCEDURE — 87635 SARS-COV-2 COVID-19 AMP PRB: CPT

## 2022-06-08 PROCEDURE — 1100000000 HC RM PRIVATE

## 2022-06-08 PROCEDURE — 97530 THERAPEUTIC ACTIVITIES: CPT

## 2022-06-08 PROCEDURE — 97112 NEUROMUSCULAR REEDUCATION: CPT

## 2022-06-08 PROCEDURE — 36415 COLL VENOUS BLD VENIPUNCTURE: CPT

## 2022-06-08 PROCEDURE — 6360000002 HC RX W HCPCS: Performed by: FAMILY MEDICINE

## 2022-06-08 RX ORDER — CIPROFLOXACIN 500 MG/1
500 TABLET, FILM COATED ORAL EVERY 12 HOURS SCHEDULED
Qty: 4 TABLET | Refills: 0
Start: 2022-06-08 | End: 2022-06-09

## 2022-06-08 RX ORDER — ASCORBIC ACID 500 MG
500 TABLET ORAL DAILY
Qty: 30 TABLET | Refills: 0
Start: 2022-06-09 | End: 2022-06-09

## 2022-06-08 RX ADMIN — LACTULOSE 20 G: 20 SOLUTION ORAL at 08:53

## 2022-06-08 RX ADMIN — FERROUS SULFATE TAB 325 MG (65 MG ELEMENTAL FE) 325 MG: 325 (65 FE) TAB at 05:41

## 2022-06-08 RX ADMIN — SODIUM CHLORIDE, PRESERVATIVE FREE 10 ML: 5 INJECTION INTRAVENOUS at 08:58

## 2022-06-08 RX ADMIN — SODIUM CHLORIDE: 9 INJECTION, SOLUTION INTRAVENOUS at 17:26

## 2022-06-08 RX ADMIN — HEPARIN SODIUM 5000 UNITS: 5000 INJECTION INTRAVENOUS; SUBCUTANEOUS at 14:24

## 2022-06-08 RX ADMIN — Medication 100 MG: at 08:53

## 2022-06-08 RX ADMIN — SODIUM CHLORIDE, PRESERVATIVE FREE 10 ML: 5 INJECTION INTRAVENOUS at 20:48

## 2022-06-08 RX ADMIN — CIPROFLOXACIN 500 MG: 500 TABLET, FILM COATED ORAL at 20:47

## 2022-06-08 RX ADMIN — LACTULOSE 20 G: 20 SOLUTION ORAL at 20:48

## 2022-06-08 RX ADMIN — CIPROFLOXACIN 500 MG: 500 TABLET, FILM COATED ORAL at 08:52

## 2022-06-08 RX ADMIN — OXYCODONE HYDROCHLORIDE AND ACETAMINOPHEN 500 MG: 500 TABLET ORAL at 08:52

## 2022-06-08 RX ADMIN — SODIUM CHLORIDE, PRESERVATIVE FREE 3 ML: 5 INJECTION INTRAVENOUS at 14:25

## 2022-06-08 RX ADMIN — SODIUM CHLORIDE: 9 INJECTION, SOLUTION INTRAVENOUS at 05:40

## 2022-06-08 RX ADMIN — INSULIN LISPRO 3 UNITS: 100 INJECTION, SOLUTION INTRAVENOUS; SUBCUTANEOUS at 17:22

## 2022-06-08 RX ADMIN — HEPARIN SODIUM 5000 UNITS: 5000 INJECTION INTRAVENOUS; SUBCUTANEOUS at 20:47

## 2022-06-08 RX ADMIN — DIVALPROEX SODIUM 125 MG: 125 CAPSULE ORAL at 14:24

## 2022-06-08 RX ADMIN — HEPARIN SODIUM 5000 UNITS: 5000 INJECTION INTRAVENOUS; SUBCUTANEOUS at 05:40

## 2022-06-08 RX ADMIN — DIVALPROEX SODIUM 125 MG: 125 CAPSULE ORAL at 08:53

## 2022-06-08 RX ADMIN — DIVALPROEX SODIUM 125 MG: 125 CAPSULE ORAL at 23:08

## 2022-06-08 RX ADMIN — SENNOSIDES AND DOCUSATE SODIUM 2 TABLET: 8.6; 5 TABLET ORAL at 08:52

## 2022-06-08 ASSESSMENT — PAIN SCALES - PAIN ASSESSMENT IN ADVANCED DEMENTIA (PAINAD)
NEGVOCALIZATION: 0
BREATHING: 0
BODYLANGUAGE: 0
BREATHING: 0
CONSOLABILITY: 0
BODYLANGUAGE: 0
FACIALEXPRESSION: 0
TOTALSCORE: 0
NEGVOCALIZATION: 0
CONSOLABILITY: 0
BREATHING: 0
TOTALSCORE: 0
FACIALEXPRESSION: 0
NEGVOCALIZATION: 0
FACIALEXPRESSION: 0
BODYLANGUAGE: 0
CONSOLABILITY: 0
TOTALSCORE: 0

## 2022-06-08 ASSESSMENT — PAIN SCALES - GENERAL
PAINLEVEL_OUTOF10: 0

## 2022-06-08 NOTE — CARE COORDINATION
CM spoke with patient's daughter Lexy Holland regarding discharge planning. Lexy Holland would like to confirm the patient is able to transfer from bed to wheelchair. Patient evaluated by therapy this afternoon. Patient is appropriate to return to [address] [place] [address]. Patient will need to arrive to facility before 2:00pm. Patient to discharge tomorrow 6/9. Caryn Johnston Transport needed at discharge.

## 2022-06-08 NOTE — PROGRESS NOTES
PHYSICAL THERAPY Daily Note and PM  (Link to Caseload Tracking: PT Visit Days : 2  Time In/Out PT Charge Capture  Rehab Caseload Tracker  Orders      Cheri Koch is a 80 y.o. male   PRIMARY DIAGNOSIS: Acute metabolic encephalopathy  Delirium [R41.0]  Acute cystitis without hematuria [N30.00]  Dementia without behavioral disturbance, unspecified dementia type ([facility] [address] [S47.71]  Acute metabolic encephalopathy [S46.27]       Inpatient: Payor: MEDICARE / Plan: MEDICARE PART A AND B / Product Type: *No Product type* /     ASSESSMENT:     REHAB RECOMMENDATIONS:   Recommendation to date pending progress:  Setting:   Return to [address] with in-house therapy    Equipment:     None (pt has all required equipment at facility)     ASSESSMENT:  Mr. Maris Hilliard presents resting in bed, daughter Jeison Matt) at bedside, pleasantly confused and agreeable to work with therapy. This date pt performs mobility including bed mobility, sitting balance activities with flucutating performance and assist levels. Pt initially very stiff with limited hip and trunk flexion resulting in significant posterior lean, however with facilitation and cues, pt able to improve to good sitting balance EOB, which then fluctuated throughout session ranging from good to poor. Pt's biggest limitation to mobility and participation in therapy is his cognition and mentation; he is physically strong and demonstrated good ability to move and coordinate limbs throughout, just not always on command due to confusion and fear of falling. He will likely do better in a familiar environment with familiar equipment. Pt did make notable improvement with his mobility and participation this session compared to previous session; improving in bed mobility and sitting balance. Pt presents as functioning below his baseline, with deficits in mobility including transfers, balance, and activity tolerance.  Pt will benefit from skilled therapy services to address stated deficits to promote return to highest level of function, independence, and safety. Will continue to follow.      SUBJECTIVE:   Mr. Taylor Ours states, \"I will end up over the edge\"     Social/Functional Lives With:  (memory care facility)  Type of Home: Assisted living ([address]  Bathroom Shower/Tub: Walk-in shower  Bathroom Equipment: Shower chair  ADL Assistance: Needs assistance  Ambulation Assistance: Needs assistance  Transfer Assistance: Independent (Prior level- Independent.)  OBJECTIVE:     PAIN: Low[place] Dionisio / O2: Heaven Nava / Jay Granda / Rina Ballardod:   Pre Treatment:   Pain Assessment: None - Denies Pain      Post Treatment: 0/10 Vitals        Oxygen    IV and Purewick    RESTRICTIONS/PRECAUTIONS:        MOBILITY: I Mod I S SBA CGA Min Mod Max Total  NT x2 Comments:   Bed Mobility    Rolling [] [] [] [] [] [] [x] [] [] [] [x]    Supine to Sit [] [] [] [] [] [] [x] [] [] [] [x]    Scooting [] [] [] [] [] [] [x] [] [] [] [x]    Sit to Supine [] [] [] [] [] [] [x] [] [] [] [x]    Transfers    Sit to Stand [] [] [] [] [] [] [] [] [] [x] []    Bed to Chair [] [] [] [] [] [] [] [] [] [x] []    Stand to Sit [] [] [] [] [] [] [] [] [] [x] []    I=Independent, Mod I=Modified Independent, S=Supervision, SBA=Standby Assistance, CGA=Contact Guard Assistance,   Min=Minimal Assistance, Mod=Moderate Assistance, Max=Maximal Assistance, Total=Total Assistance, NT=Not Tested    BALANCE: Good Fair+ Fair Fair- Poor NT Comments   Sitting Static [] [] [x] [] [] []  fluctuating assist required   Sitting Dynamic [] [] [x] [] [] []              Standing Static [] [] [] [] [] [x]    Standing Dynamic [] [] [] [] [] [x]      GAIT: I Mod I S SBA CGA Min Mod Max Total  NT x2 Comments:   Level of Assistance [] [] [] [] [] [] [] [] [] [] []    Distance   feet    DME N/A    Gait Quality N/A    Weightbearing Status      Stairs      I=Independent, Mod I=Modified Independent, S=Supervision, SBA=Standby Assistance, CGA=Contact Guard Assistance,   Min=Minimal Assistance, Mod=Moderate Assistance, Max=Maximal Assistance, Total=Total Assistance, NT=Not Tested    PLAN:   ACUTE PHYSICAL THERAPY GOALS:   (Developed with and agreed upon by patient and/or caregiver.)  (1.)Mr. Anjana Pardo will move from supine to sit and sit to supine , scoot up and down and roll side to side in bed with MINIMAL ASSIST within 7 treatment day(s). (2.)Mr. Anjana Pardo will transfer from bed to chair and chair to bed with MODERATE ASSIST using the least restrictive device within 7 treatment day(s). (3.)Mr. Anjana Pardo will ambulate with MODERATE ASSIST for 15 feet with the least restrictive device within 7 treatment day(s). (4.)Mr. Anjana Pardo will tolerate at least 23 min of dynamic standing activity to assist standing ADLs with the least restrictive device within 7 treatment days. FREQUENCY AND DURATION: 3 times/week for duration of hospital stay or until stated goals are met, whichever comes first.    TREATMENT:   TREATMENT:   Co-Treatment PT/OT necessary due to patient's decreased overall endurance/tolerance levels, as well as need for high level skilled assistance to complete functional transfers/mobility and functional tasks  Therapeutic Activity (44 Minutes): Therapeutic activity included Rolling, Supine to Sit, Sit to Supine, Scooting, Lateral Scooting and Sitting balance  to improve functional Activity tolerance, Balance, Coordination, Mobility, Strength and ROM.     TREATMENT GRID:  N/A    AFTER TREATMENT PRECAUTIONS: Bed, Bed/Chair Locked, Needs within reach, RN notified and Visitors at bedside    INTERDISCIPLINARY COLLABORATION:  RN/ PCT, PT/ PTA, OT/ JOHNS and RN Case Manager/      EDUCATION:      TIME IN/OUT:  Time In: 1326  Time Out: [address]   Minutes: [address] [place], [place]

## 2022-06-08 NOTE — DISCHARGE SUMMARY
Hospitalist Discharge Summary   Admit Date:  2022 12:46 PM   DC Note date: 2022  Name:  Keyona Skelton   Age:  80 y.o. Sex:  male  :  1935   MRN:  [id number]   Room:  [facility]/  PCP:  SALLIE Sutherland NP    Presenting Complaint: Fatigue    Initial Admission Diagnosis: Delirium [R41.0]  Acute cystitis without hematuria [N30.00]  Dementia without behavioral disturbance, unspecified dementia type ([facility] [address] [B54.17]  Acute metabolic encephalopathy [K15.44]     Problem List for this Hospitalization:    Did Patient have Sepsis (YES OR NO): No     Hospital Course:  22-year-old male with past medical history of diabetes, hyperlipidemia, vascular dementia, multiple myeloma in remission, bradycardia status post pacemaker, presented in setting of worsening altered mental status    CT brain without acute abnorms, +UA and patient started on Rocephin. Ammonia level elevated likely 2/2 EColi UTI and patient started on lactulose and mentation improved. UCx resulted with susceptibility to Cipro and patient was transitioned to oral Cipro in anticipation of discharge. Patient denied pain, distress, eating well with assistance per staff and patient discharge back to [facility]. BMP in 1 week for monitoring of electrolytes.          Disposition: To LTC (Memory Care)  Diet: ADULT DIET; Dysphagia - Soft and Bite Sized  Code Status: DNR     Follow Up Orders: To be determined by receiving facility        Follow up labs/diagnostics (ultimately defer to outpatient provider): BMP in 1 week     Time spent in patient discharge and coordination 38 minutes. Patient was stable at time of discharge. Instructions given to call a physician or return if any concerns.     Discharge Info:      Medication List      START taking these medications    ciprofloxacin 500 mg tablet  Commonly known as: CIPRO  Take 1 tablet by mouth every 12 hours for 4 doses        CHANGE how you take these medications    ascorbic acid 500 MG tablet  Commonly known as: VITAMIN C  Take 1 tablet by mouth daily  Start taking on: June 9, 2022  What changed:   · how much to take  · when to take this     glipiZIDE 10 MG extended release tablet  Commonly known as: GLUCOTROL XL  What changed: Another medication with the same name was removed. Continue taking this medication, and follow the directions you see here. SITagliptin 100 MG tablet  Commonly known as: Januvia  Take 1 tablet by mouth daily  What changed: when to take this        CONTINUE taking these medications    acetaminophen 325 mg tablet  Commonly known as: TYLENOL     divalproex 125 MG capsule  Commonly known as: DEPAKOTE SPRINKLE     ferrous sulfate 325 (65 Fe) MG tablet  Commonly known as: IRON 325     haloperidol 0.5 MG tablet  Commonly known as: HALDOL     insulin lispro 100 UNIT/ML Soln injection vial  Commonly known as: HUMALOG     Lantus SoloStar 100 UNIT/ML injection pen  Generic drug: insulin glargine     lidocaine 4 % external patch     memantine 5 MG tablet  Commonly known as: NAMENDA     midodrine 5 MG tablet  Commonly known as: PROAMATINE     polyethylene glycol 17 g packet  Commonly known as: GLYCOLAX     pravastatin 20 MG tablet  Commonly known as: PRAVACHOL     senna-docusate 8.6-50 MG per tablet  Commonly known as: PERICOLACE           Where to Get Your Medications      Information about where to get these medications is not yet available    Ask your nurse or doctor about these medications  · ascorbic acid 500 MG tablet  · ciprofloxacin 500 mg tablet  · SITagliptin 100 MG tablet         Procedures done this admission:  * No surgery found *    Consults this admission:  None    Echocardiogram/EKG results:      No results found for this or any previous visit (from the past 4464 hour(s)). Diagnostic Imaging/Tests:   XR PELVIS (1-2 VIEWS)    Result Date: 6/5/2022  Pelvis INDICATION: Pelvic pain A supine view of the pelvis was obtained. The patient is post left hip replacement. Implant is well-positioned. There is no fracture or dislocation. There are no bone lesions. No acute findings    XR KNEE LEFT (1-2 VIEWS)    Result Date: 6/5/2022  Left Knee INDICATION: Pain AP and lateral views of the left knee were obtained FINDINGS: There is no significant joint space narrowing. There is no evidence of fracture or other acute bony abnormality. No bony lesions are seen. There is no joint effusion. There is vascular calcification. No acute findings     CT Head W/O Contrast    Result Date: 6/4/2022  History: AMS for 3-5 days/functional decline Exam: CT head without contrast Technique: Thin section axial CT images were obtained from the skullbase through the vertex. Radiation dose reduction techniques were used for this study. Our CT scanners use one or all of the following: Automated exposure control, adjustment of the mA and/or kV according to patient size, use of iterative reconstruction. Findings: The ventricles are normal in size, shape, and position. There is generalized cerebral atrophy with chronic appearing white matter change in the corona radiata and centrum semiovale. No extra-axial fluid collection is present. There is no mass or mass-effect. The basilar cisterns are patent. The paranasal sinuses and mastoid air cells are clear. Chronic appearing changes without acute intracranial abnormality. XR CHEST PORTABLE    Result Date: 6/4/2022  History: Altered mental status Exam: portable chest Comparison: 4/1/2022 Findings: No change in the appearance of the lungs. There is a cardiac device overlying the left chest wall. No change in the appearance of the mediastinal contour or osseous structures. IMPRESSIONs: No acute findings.          Labs: Results:       BMP, Mg, Phos Recent Labs     06/06/22  0929 06/07/22  0456 06/08/22  0500    143 142   K 4.0 3.7 3.7    107 108*   CO2 30 30 29   BUN 15 17 16      CBC Recent Labs     06/06/22  0929 06/07/22  0456 06/08/22  0500   WBC 4.0* 3.9* 3.6*   RBC 4.30 4.14* 3.88*   HGB 12.4* 12.0* 11.3*   HCT 37.9* 36.5* 34.2*    242 245      LFT No results for input(s): ALT, TP, ALB, GLOB in the last 72 hours.     Invalid input(s): SGOT, TBIL, AP, AGRAT, GPT   Cardiac Testing No results found for: BNP, CPK, RCK1, CKMB   Coagulation Tests No results found for: INR, APTT   A1c No results found for: HBA1C   Lipid Panel No results found for: CHOL, CHOLPOCT, CHOLX, CHLST, CHOLV, 125764, HDL, HDLC, LDL, LDLC, 994060, VLDLC, VLDL, TGLX, TRIGL   Thyroid Panel Lab Results   Component Value Date    TSH 4.540 02/02/2022        Most Recent UA Lab Results   Component Value Date    MUCUS 0 06/04/2022    UCOM RESULTS VERIFIED MANUALLY 06/04/2022          All Labs from Last 24 Hrs:  Recent Results (from the past 24 hour(s))   POCT Glucose    Collection Time: 06/07/22 11:32 AM   Result Value Ref Range    POC Glucose 160 (H) 65 - 100 mg/dL    Performed by: [facility]    POCT Glucose    Collection Time: 06/07/22  4:22 PM   Result Value Ref Range    POC Glucose 248 (H) 65 - 100 mg/dL    Performed by: [facility]    POCT Glucose    Collection Time: 06/07/22  9:08 PM   Result Value Ref Range    POC Glucose 263 (H) 65 - 100 mg/dL    Performed by: Jacoby Harrison    CBC with Auto Differential    Collection Time: 06/08/22  5:00 AM   Result Value Ref Range    WBC 3.6 (L) 4.3 - 11.1 K/uL    RBC 3.88 (L) 4.23 - 5.6 M/uL    Hemoglobin 11.3 (L) 13.6 - 17.2 g/dL    Hematocrit 34.2 (L) 41.1 - 50.3 %    MCV 88.1 79.6 - 97.8 FL    MCH 29.1 26.1 - 32.9 PG    MCHC 33.0 31.4 - 35.0 g/dL    RDW 12.0 11.9 - 14.6 %    Platelets 634 630 - 601 K/uL    MPV 9.8 9.4 - 12.3 FL    nRBC 0.00 0.0 - 0.2 K/uL    Differential Type AUTOMATED      Seg Neutrophils 63 43 - 78 %    Lymphocytes 22 13 - 44 %    Monocytes 11 4.0 - 12.0 %    Eosinophils % 2 0.5 - 7.8 %    Basophils 1 0.0 - 2.0 %    Immature Granulocytes 1 0.0 - 5.0 %    Segs Absolute 2.3 1.7 - 8.2 K/UL Absolute Lymph # 0.8 0.5 - 4.6 K/UL    Absolute Mono # 0.4 0.1 - 1.3 K/UL    Absolute Eos # 0.1 0.0 - 0.8 K/UL    Basophils Absolute 0.0 0.0 - 0.2 K/UL    Absolute Immature Granulocyte 0.0 0.0 - 0.5 K/UL   Basic Metabolic Panel    Collection Time: 06/08/22  5:00 AM   Result Value Ref Range    Sodium 142 138 - 145 mmol/L    Potassium 3.7 3.5 - 5.1 mmol/L    Chloride 108 (H) 98 - 107 mmol/L    CO2 29 21 - 32 mmol/L    Anion Gap 5 (L) 7 - 16 mmol/L    Glucose 188 (H) 65 - 100 mg/dL    BUN 16 8 - 23 MG/DL    CREATININE 0.70 (L) 0.8 - 1.5 MG/DL    GFR African American >60 >60 ml/min/1.73m2    GFR Non- >60 >60 ml/min/1.73m2    Calcium 8.2 (L) 8.3 - 10.4 MG/DL   POCT Glucose    Collection Time: 06/08/22  7:51 AM   Result Value Ref Range    POC Glucose 171 (H) 65 - 100 mg/dL    Performed by: [facility]    POCT Glucose    Collection Time: 06/08/22 10:45 AM   Result Value Ref Range    POC Glucose 196 (H) 65 - 100 mg/dL    Performed by: [facility]        Current Med List in Hospital:   Current Facility-Administered Medications   Medication Dose Route Frequency    ciprofloxacin (CIPRO) tablet 500 mg  500 mg Oral 2 times per day    lactulose (CHRONULAC) 10 GM/15ML solution 20 g  20 g Oral BID    sodium chloride flush 0.9 % injection 3 mL  3 mL IntraVENous Q8H    ascorbic acid (VITAMIN C) tablet 500 mg  500 mg Oral Daily    divalproex (DEPAKOTE SPRINKLE) capsule 125 mg  125 mg Oral Q8H    ferrous sulfate (IRON 325) tablet 325 mg  325 mg Oral QAM AC    sennosides-docusate sodium (SENOKOT-S) 8.6-50 MG tablet 2 tablet  2 tablet Oral Daily    vitamin B-6 (PYRIDOXINE) tablet 100 mg  100 mg Oral Daily    sodium chloride flush 0.9 % injection 5-40 mL  5-40 mL IntraVENous 2 times per day    sodium chloride flush 0.9 % injection 5-40 mL  5-40 mL IntraVENous PRN    0.9 % sodium chloride infusion   IntraVENous PRN    ondansetron (ZOFRAN-ODT) disintegrating tablet 4 mg  4 mg Oral Q8H PRN    Or    ondansetron (ZOFRAN) injection 4 mg  4 mg IntraVENous Q6H PRN    acetaminophen (TYLENOL) tablet 650 mg  650 mg Oral Q6H PRN    Or    acetaminophen (TYLENOL) suppository 650 mg  650 mg Rectal Q6H PRN    heparin (porcine) injection 5,000 Units  5,000 Units SubCUTAneous 3 times per day    insulin lispro (HUMALOG) injection vial 0-4 Units  0-4 Units SubCUTAneous TID WC    insulin lispro (HUMALOG) injection vial 0-4 Units  0-4 Units SubCUTAneous Nightly    0.9 % sodium chloride infusion   IntraVENous Continuous       No Known Allergies  Immunization History   Administered Date(s) Administered    PPD Test 02/03/2022, 06/04/2022       Recent Vital Data:  Patient Vitals for the past 24 hrs:   Temp Pulse Resp BP SpO2   06/08/22 1041 97.7 °F (36.5 °C) 54 16 111/64 96 %   06/08/22 0748 98.4 °F (36.9 °C) 54 16 127/65 94 %   06/08/22 0519 97.7 °F (36.5 °C) 55 16 120/74 94 %   06/08/22 0057 98.8 °F (37.1 °C) 56 16 131/60 93 %   06/07/22 2031 98.6 °F (37 °C) 57 15 128/65 95 %   06/07/22 1415 97.5 °F (36.4 °C) 52 16 109/60 94 %   06/07/22 1130 97.7 °F (36.5 °C) 60 18 103/65 93 %         Estimated body mass index is 22.82 kg/m² as calculated from the following:    Height as of this encounter: 5' 7\" (1.702 m). Weight as of this encounter: 145 lb 11.6 oz (66.1 kg). Intake/Output Summary (Last 24 hours) at 6/8/2022 1120  Last data filed at 6/8/2022 0544  Gross per 24 hour   Intake 6584.55 ml   Output 1300 ml   Net 5284.55 ml         Physical Exam:    General:    Frail appearing. No overt distress  Head:  Normocephalic, atraumatic  Eyes:  Sclerae appear normal.  Pupils equally round. HENT:  Nares appear normal, no drainage. Moist mucous membranes  Neck:  No restricted ROM. Trachea midline  CV:   RRR. No m/r/g. No JVD  Lungs:   CTAB. No wheezing, rhonchi, or rales. Even, unlabored  Abdomen:   Soft, nontender, nondistended. Extremities: Warm and dry. No cyanosis or clubbing. No edema. Skin:     No rashes. Normal coloration  Neuro:  CN II-XII grossly intact. Psych:  Normal mood and affect. Alert and oriented x 1       Signed:  SALLIE Wong CNP    Part of this note may have been written by using a voice dictation software. The note has been proof read but may still contain some grammatical/other typographical errors.

## 2022-06-08 NOTE — PROGRESS NOTES
OCCUPATIONAL THERAPY Daily Note and PM     OT Visit Days: 2   Time  OT Charge Capture  Rehab Caseload Tracker  OT Orders    Kyra Whiting is a 80 y.o. male   PRIMARY DIAGNOSIS: Acute metabolic encephalopathy  Delirium [R41.0]  Acute cystitis without hematuria [N30.00]  Dementia without behavioral disturbance, unspecified dementia type ([facility] [address] [X97.85]  Acute metabolic encephalopathy [O37.25]       Inpatient: Payor: MEDICARE / Plan: MEDICARE PART A AND B / Product Type: *No Product type* /     ASSESSMENT:     REHAB RECOMMENDATIONS: CURRENT LEVEL OF FUNCTION:  (Most Recently Demonstrated)   Recommendation to date pending progress:  Setting:   OT at [facility]    Equipment:     To Be Determined Bathing:   Not Tested  Dressing:   Not Tested  Feeding/Grooming:   Not Tested  Toileting:   Not Tested  Functional Mobility:   Moderate Assist x 2 bed mobility      ASSESSMENT:  Mr. Natalie Carlson presents supine in the bed eating lunch with supportive daughter at bedside. Pt is pleasantly confused. Pt worked on bed mobility needing some repetition for simple one step commands for mobilizing and sitting to edge of the bed. Pt appears to have good strength in the extremities but is limited by decreased ability to problem solve in unfamiliar environment. Pt's assistance levels fluctuated from SBA to total A for sitting balance with pt pushing back posteriorly at times. Therapists were able to redirect him and provide facilitation for improving posture in upright position throughout session. Pt appears fearful to stand and is not familiar or trusting of the equipment that is present in the hospital room. Pt did demonstrate improvement from previous session. Pt will likely do better in a more familiar environment. Pt to continue per plan of care.         SUBJECTIVE:     Mr. Natalie Carlson states, \"This hip over here is really hurting \"     Social/Functional Lives With:  (memory care facility)  Type of Home: Assisted living [facility]  Bathroom Shower/Tub: Walk-in shower  Bathroom Equipment: Shower chair  ADL Assistance: Needs assistance  Ambulation Assistance: Needs assistance  Transfer Assistance: Independent (Prior level- Independent.)    OBJECTIVE:     Jenny Hatter / Juan Collier / AIRWAY: IV and Purewick    RESTRICTIONS/PRECAUTIONS:  Restrictions/Precautions  Restrictions/Precautions: Fall Risk        PAIN: VITALS / O2:   Pre Treatment:              Post Treatment: same Vitals          Oxygen            MOBILITY: I Mod I S SBA CGA Min Mod Max Total  NT x2 Comments:   Bed Mobility    Rolling [] [] [] [] [] [] [x] [] [] [] [x]    Supine to Sit [] [] [] [] [] [] [x] [] [] [] [x]    Scooting [] [] [] [] [] [] [x] [] [] [] [x]    Sit to Supine [] [] [] [] [] [] [x] [] [] [] []    Transfers    Sit to Stand [] [] [] [] [] [] [] [] [] [x] []    Bed to Chair [] [] [] [] [] [] [] [] [] [x] []    Stand to Sit [] [] [] [] [] [] [] [] [] [x] []    Tub/Shower [] [] [] [] [] [] [] [] [] [x] []     Toilet [] [] [] [] [] [] [] [] [] [x] []      [] [] [] [] [] [] [] [] [] [] []    I=Independent, Mod I=Modified Independent, S=Supervision/Setup, SBA=Standby Assistance, CGA=Contact Guard Assistance, Min=Minimal Assistance, Mod=Moderate Assistance, Max=Maximal Assistance, Total=Total Assistance, NT=Not Tested    ACTIVITIES OF DAILY LIVING: I Mod I S SBA CGA Min Mod Max Total NT Comments   BASIC ADLs:              Bathing/ Showering [] [] [] [] [] [] [] [] [] [x]    Toileting [] [] [] [] [] [] [] [] [] [x]    Upper Body Dressing [] [] [] [] [] [] [] [] [] [x]    Lower Body Dressing [] [] [] [] [] [] [] [] [] [x]    Feeding [] [] [] [] [] [x] [] [] [] [] Attempting to eat EOB   Grooming [] [] [] [] [] [] [] [] [] [x]    Personal Device Care [] [] [] [] [] [] [] [] [] [x]    Functional Mobility [] [] [] [] [] [] [] [] [] [x]    I=Independent, Mod I=Modified Independent, S=Supervision/Setup, SBA=Standby Assistance, CGA=Contact Guard Assistance, Min=Minimal Assistance, Mod=Moderate Assistance, Max=Maximal Assistance, Total=Total Assistance, NT=Not Tested    BALANCE: Good Fair+ Fair Fair- Poor NT Comments   Sitting Static [] [x] [] [] [x] []    Sitting Dynamic [] [] [x] [] [x] []              Standing Static [] [] [] [] [] [x]    Standing Dynamic [] [] [] [] [] [x]        PLAN:     FREQUENCY/DURATION   OT Plan of Care: 3 times/week for duration of hospital stay or until stated goals are met, whichever comes first.    ACUTE OCCUPATIONAL THERAPY GOALS:   (Developed with and agreed upon by patient and/or caregiver.)    1. Patient will complete upper body bathing and dressing with min A and adaptive equipment as needed. 2. Patient will complete self-grooming tasks in unsupported sitting with set-up and adaptive equipment as needed. .   3. Patient will tolerate 25 minutes of OT treatment with 1-2 rest breaks to increase activity tolerance for ADLs. 4. Patient will complete functional transfers with min A and adaptive equipment as needed. 5. Patient will follow 75% commands with min verbal cues from therapist to increase participation in ADLs. 6. Patient will tolerate 15 minutes unsupported sitting balance with SBA in preparation for ADL performance.      Timeframe: 7 visits        TREATMENT:     TREATMENT:   Co-Treatment PT/OT necessary due to patient's decreased overall endurance/tolerance levels, as well as need for high level skilled assistance to complete functional transfers/mobility and functional tasks  Neuromuscular Re-education (38 Minutes): Neuromuscular Re-education included Balance Training, Coordination training, Postural training and Sitting balance training to improve Balance, Coordination and Postural Control.     TREATMENT GRID:  N/A    AFTER TREATMENT PRECAUTIONS: Alarm Activated, Bed, Bed/Chair Locked, Call light within reach, Needs within reach, RN notified and Visitors at bedside    INTERDISCIPLINARY COLLABORATION:  RN/ PCT, PT/ PTA, OT/ JOHNS and RN Case Manager/      EDUCATION:       TOTAL TREATMENT DURATION AND TIME:  Time In: 1326  Time Out: 99 Emmanuel Paulino  Minutes: [address] [facility],[address] [place] [address]

## 2022-06-08 NOTE — PLAN OF CARE
Problem: Discharge Planning  Goal: Discharge to home or other facility with appropriate resources  Outcome: Progressing     Problem: Pain  Goal: Verbalizes/displays adequate comfort level or baseline comfort level  Outcome: Progressing     Problem: Skin/Tissue Integrity  Goal: Absence of new skin breakdown  Description: 1. Monitor for areas of redness and/or skin breakdown  2. Assess vascular access sites hourly  3. Every 4-6 hours minimum:  Change oxygen saturation probe site  4. Every 4-6 hours:  If on nasal continuous positive airway pressure, respiratory therapy assess nares and determine need for appliance change or resting period. Outcome: Progressing     Problem: Safety - Adult  Goal: Free from fall injury  Outcome: Progressing  Flowsheets (Taken 6/7/2022 1912)  Free From Fall Injury: Instruct family/caregiver on patient safety     Problem: ABCDS Injury Assessment  Goal: Absence of physical injury  Outcome: Progressing     Problem: Confusion  Goal: Confusion, delirium, dementia, or psychosis is improved or at baseline  Description: INTERVENTIONS:  1. Assess for possible contributors to thought disturbance, including medications, impaired vision or hearing, underlying metabolic abnormalities, dehydration, psychiatric diagnoses, and notify attending LIP  2. [place] high risk fall precautions, as indicated  3. Provide frequent short contacts to provide reality reorientation, refocusing and direction  4. Decrease environmental stimuli, including noise as appropriate  5. Monitor and intervene to maintain adequate nutrition, hydration, elimination, sleep and activity  6. If unable to ensure safety without constant attention obtain sitter and review sitter guidelines with assigned personnel  7.  Initiate Psychosocial CNS and Spiritual Care consult, as indicated  Outcome: Progressing     Problem: Chronic Conditions and Co-morbidities  Goal: Patient's chronic conditions and co-morbidity symptoms are monitored and maintained or improved  Outcome: Progressing     Problem: Neurosensory - Adult  Goal: Achieves stable or improved neurological status  Outcome: Progressing  Goal: Absence of seizures  Outcome: Progressing  Goal: Remains free of injury related to seizures activity  Outcome: Progressing  Goal: Achieves maximal functionality and self care  Outcome: Progressing     Problem: Respiratory - Adult  Goal: Achieves optimal ventilation and oxygenation  Outcome: Progressing     Problem: Cardiovascular - Adult  Goal: Maintains optimal cardiac output and hemodynamic stability  Outcome: Progressing  Goal: Absence of cardiac dysrhythmias or at baseline  Outcome: Progressing     Problem: Skin/Tissue Integrity - Adult  Goal: Skin integrity remains intact  Outcome: Progressing  Flowsheets  Taken 6/7/2022 1912  Skin Integrity Remains Intact: Monitor for areas of redness and/or skin breakdown  Taken 6/7/2022 1911  Skin Integrity Remains Intact: Monitor for areas of redness and/or skin breakdown  Goal: Incisions, wounds, or drain sites healing without S/S of infection  Outcome: Progressing  Goal: Oral mucous membranes remain intact  Outcome: Progressing     Problem: Musculoskeletal - Adult  Goal: Return mobility to safest level of function  Outcome: Progressing  Goal: Maintain proper alignment of affected body part  Outcome: Progressing  Goal: Return ADL status to a safe level of function  Outcome: Progressing     Problem: Gastrointestinal - Adult  Goal: Minimal or absence of nausea and vomiting  Outcome: Progressing  Goal: Maintains or returns to baseline bowel function  Outcome: Progressing  Goal: Maintains adequate nutritional intake  Outcome: Progressing  Goal: Establish and maintain optimal ostomy function  Outcome: Progressing     Problem: Genitourinary - Adult  Goal: Absence of urinary retention  Outcome: Progressing  Goal: Urinary catheter remains patent  Outcome: Progressing     Problem: Infection - Adult  Goal: Absence of infection at discharge  Outcome: Progressing     Problem: Metabolic/Fluid and Electrolytes - Adult  Goal: Electrolytes maintained within normal limits  Outcome: Progressing  Goal: Hemodynamic stability and optimal renal function maintained  Outcome: Progressing  Goal: Glucose maintained within prescribed range  Outcome: Progressing     Problem: Hematologic - Adult  Goal: Maintains hematologic stability  Outcome: Progressing

## 2022-06-08 NOTE — PROGRESS NOTES
Hourly rounds performed this shift. Patient ate well today. Patient denies needs at this time. Bed in low position. The call light and personal items are in reach. Bed alarm is set. Door is open and patient is close to nurse's station. Patient continues to be disoriented X4. Will continue to monitor and report to oncoming nurse.

## 2022-06-09 VITALS
OXYGEN SATURATION: 96 % | WEIGHT: 145.72 LBS | HEIGHT: 67 IN | BODY MASS INDEX: 22.87 KG/M2 | RESPIRATION RATE: 14 BRPM | TEMPERATURE: 98.6 F | HEART RATE: 51 BPM | SYSTOLIC BLOOD PRESSURE: 138 MMHG | DIASTOLIC BLOOD PRESSURE: 60 MMHG

## 2022-06-09 LAB
ANION GAP SERPL CALC-SCNC: 5 MMOL/L (ref 7–16)
BACTERIA SPEC CULT: NORMAL
BACTERIA SPEC CULT: NORMAL
BASOPHILS # BLD: 0 K/UL (ref 0–0.2)
BASOPHILS NFR BLD: 1 % (ref 0–2)
BUN SERPL-MCNC: 11 MG/DL (ref 8–23)
CALCIUM SERPL-MCNC: 8.9 MG/DL (ref 8.3–10.4)
CHLORIDE SERPL-SCNC: 107 MMOL/L (ref 98–107)
CO2 SERPL-SCNC: 30 MMOL/L (ref 21–32)
CREAT SERPL-MCNC: 0.7 MG/DL (ref 0.8–1.5)
DIFFERENTIAL METHOD BLD: ABNORMAL
EOSINOPHIL # BLD: 0.1 K/UL (ref 0–0.8)
EOSINOPHIL NFR BLD: 2 % (ref 0.5–7.8)
ERYTHROCYTE [DISTWIDTH] IN BLOOD BY AUTOMATED COUNT: 12 % (ref 11.9–14.6)
GLUCOSE BLD STRIP.AUTO-MCNC: 185 MG/DL (ref 65–100)
GLUCOSE SERPL-MCNC: 168 MG/DL (ref 65–100)
HCT VFR BLD AUTO: 34.9 % (ref 41.1–50.3)
HGB BLD-MCNC: 11.5 G/DL (ref 13.6–17.2)
IMM GRANULOCYTES # BLD AUTO: 0 K/UL (ref 0–0.5)
IMM GRANULOCYTES NFR BLD AUTO: 1 % (ref 0–5)
LYMPHOCYTES # BLD: 0.8 K/UL (ref 0.5–4.6)
LYMPHOCYTES NFR BLD: 23 % (ref 13–44)
MCH RBC QN AUTO: 29.6 PG (ref 26.1–32.9)
MCHC RBC AUTO-ENTMCNC: 33 G/DL (ref 31.4–35)
MCV RBC AUTO: 89.7 FL (ref 79.6–97.8)
MONOCYTES # BLD: 0.4 K/UL (ref 0.1–1.3)
MONOCYTES NFR BLD: 12 % (ref 4–12)
NEUTS SEG # BLD: 2.1 K/UL (ref 1.7–8.2)
NEUTS SEG NFR BLD: 61 % (ref 43–78)
NRBC # BLD: 0 K/UL (ref 0–0.2)
PLATELET # BLD AUTO: 250 K/UL (ref 150–450)
PMV BLD AUTO: 10 FL (ref 9.4–12.3)
POTASSIUM SERPL-SCNC: 3.8 MMOL/L (ref 3.5–5.1)
RBC # BLD AUTO: 3.89 M/UL (ref 4.23–5.6)
SERVICE CMNT-IMP: ABNORMAL
SERVICE CMNT-IMP: NORMAL
SERVICE CMNT-IMP: NORMAL
SODIUM SERPL-SCNC: 142 MMOL/L (ref 136–145)
WBC # BLD AUTO: 3.4 K/UL (ref 4.3–11.1)

## 2022-06-09 PROCEDURE — 85025 COMPLETE CBC W/AUTO DIFF WBC: CPT

## 2022-06-09 PROCEDURE — 82962 GLUCOSE BLOOD TEST: CPT

## 2022-06-09 PROCEDURE — 36415 COLL VENOUS BLD VENIPUNCTURE: CPT

## 2022-06-09 PROCEDURE — 6370000000 HC RX 637 (ALT 250 FOR IP): Performed by: NURSE PRACTITIONER

## 2022-06-09 PROCEDURE — 80048 BASIC METABOLIC PNL TOTAL CA: CPT

## 2022-06-09 PROCEDURE — 6360000002 HC RX W HCPCS: Performed by: FAMILY MEDICINE

## 2022-06-09 PROCEDURE — 6370000000 HC RX 637 (ALT 250 FOR IP): Performed by: FAMILY MEDICINE

## 2022-06-09 RX ORDER — CIPROFLOXACIN 500 MG/1
500 TABLET, FILM COATED ORAL EVERY 12 HOURS SCHEDULED
Qty: 4 TABLET | Refills: 0 | Status: SHIPPED | OUTPATIENT
Start: 2022-06-09 | End: 2022-06-11

## 2022-06-09 RX ORDER — ASCORBIC ACID 500 MG
500 TABLET ORAL DAILY
Qty: 30 TABLET | Refills: 0 | Status: SHIPPED | OUTPATIENT
Start: 2022-06-09 | End: 2022-07-09

## 2022-06-09 RX ADMIN — FERROUS SULFATE TAB 325 MG (65 MG ELEMENTAL FE) 325 MG: 325 (65 FE) TAB at 05:18

## 2022-06-09 RX ADMIN — HEPARIN SODIUM 5000 UNITS: 5000 INJECTION INTRAVENOUS; SUBCUTANEOUS at 05:18

## 2022-06-09 RX ADMIN — DIVALPROEX SODIUM 125 MG: 125 CAPSULE ORAL at 09:05

## 2022-06-09 RX ADMIN — Medication 100 MG: at 09:05

## 2022-06-09 RX ADMIN — OXYCODONE HYDROCHLORIDE AND ACETAMINOPHEN 500 MG: 500 TABLET ORAL at 09:05

## 2022-06-09 RX ADMIN — SENNOSIDES AND DOCUSATE SODIUM 2 TABLET: 8.6; 5 TABLET ORAL at 09:05

## 2022-06-09 RX ADMIN — CIPROFLOXACIN 500 MG: 500 TABLET, FILM COATED ORAL at 09:05

## 2022-06-09 NOTE — PROGRESS NOTES
Pt pulled IV out. MD notified via [facility]. Per MD ok to leave IV out since pt getting discharged today.

## 2022-06-09 NOTE — PROGRESS NOTES
Hospitalist Progress Note   Admit Date:  2022 12:46 PM   Name:  Adrienne Villalobos   Age:  80 y.o. Sex:  male  :  1935   MRN:  [id number]   Room:  [facility]/    Presenting Complaint: Fatigue     Reason(s) for Admission: Delirium [R41.0]  Acute cystitis without hematuria [N30.00]  Dementia without behavioral disturbance, unspecified dementia type ([facility] [V23.41]  Acute metabolic encephalopathy [L80.95]     Hospital Course & Interval History:   72-year-old male with past medical history of diabetes, hyperlipidemia, vascular dementia, multiple myeloma in remission, bradycardia status post pacemaker, presented in setting of worsening altered mental status     CT brain without acute abnorms, +UA and patient started on Rocephin. Ammonia level elevated likely 2/2 EColi UTI and patient started on lactulose and mentation improved. UCx resulted with susceptibility to Cipro and patient was transitioned to oral Cipro in anticipation of discharge. Patient denied pain, distress, eating well with assistance per staff and patient discharge back to [facility]. BMP in 1 week for monitoring of electrolytes. Subjective/24hr Events (22): No AEO. Patient has a good appetite. Patient ready for discharge. Daughter Angela Herbert at bedside is in agreement. PT/OT re-assessment yesterday. Assessment & Plan:     Acute encephalopathy likely in setting of UTI and underlying dementia  -CT of the brain without acute intracranial abnormalities  -Ammonia level was elevated (likely related to UTI), B12 level 350  -Improved with lactulose  -Changed to oral Cipro per susceptibility results in anticipation of discharge  -Continue Depakote  -Monitor mental status  -Avoid sedatives  -Delirium precautions     UTI  -Cipro per UCx results     Diabetes mellitus  -Insulin sliding scale  -Blood sugar checks before meals and at bedtime    Discharge Planning:  To [facility] on ; see discharge summary on     Diet:  ADULT DIET; Dysphagia - Soft and Bite Sized  DVT PPx: heparin  Code status: DNR    Hospital Problems:  Principal Problem:    Acute metabolic encephalopathy  Active Problems:    Complicated UTI (urinary tract infection)    Vascular dementia (HCC)    Diabetes mellitus type 2, controlled ([facility]  Resolved Problems:    * No resolved hospital problems. *      Objective:     Patient Vitals for the past 24 hrs:   Temp Pulse Resp BP SpO2   06/09/22 0743 98.6 °F (37 °C) 51 14 138/60 96 %   06/09/22 0400 -- 54 -- -- --   06/09/22 0343 97.6 °F (36.4 °C) (!) 49 18 118/60 96 %   06/08/22 2256 98.5 °F (36.9 °C) 54 18 123/64 98 %   06/08/22 2011 98.3 °F (36.8 °C) 60 18 122/74 100 %   06/08/22 1554 98.6 °F (37 °C) 57 14 127/69 98 %   06/08/22 1041 97.7 °F (36.5 °C) 54 16 111/64 96 %       Oxygen Therapy  SpO2: 96 %  O2 Device: None (Room air)    Estimated body mass index is 22.82 kg/m² as calculated from the following:    Height as of this encounter: 5' 7\" (1.702 m). Weight as of this encounter: 145 lb 11.6 oz (66.1 kg). Intake/Output Summary (Last 24 hours) at 6/9/2022 0909  Last data filed at 6/9/2022 0615  Gross per 24 hour   Intake --   Output 1550 ml   Net -1550 ml         Physical Exam:   Blood pressure 138/60, pulse 51, temperature 98.6 °F (37 °C), temperature source Oral, resp. rate 14, height 5' 7\" (1.702 m), weight 145 lb 11.6 oz (66.1 kg), SpO2 96 %. General:    No acute distress  Head:  Normocephalic, atraumatic  Eyes:  Sclerae appear normal.  Pupils equally round. ENT:  Nares appear normal, no drainage. Moist oral mucosa  Neck:  No restricted ROM. Trachea midline   CV:   RRR. No m/r/g. Lungs: No wheezing. Respirations even, unlabored on room air. Abdomen:   Soft, nondistended. Extremities: No cyanosis or clubbing. Skin:     No rashes and normal coloration  Neuro:  CN II-XII grossly intact. A&O  Psych:  Normal mood and affect.       I have reviewed ordered lab tests and independently visualized imaging below:    Recent Labs:  Recent Results (from the past 48 hour(s))   POCT Glucose    Collection Time: 06/07/22 11:32 AM   Result Value Ref Range    POC Glucose 160 (H) 65 - 100 mg/dL    Performed by: [facility]    POCT Glucose    Collection Time: 06/07/22  4:22 PM   Result Value Ref Range    POC Glucose 248 (H) 65 - 100 mg/dL    Performed by: [facility]    POCT Glucose    Collection Time: 06/07/22  9:08 PM   Result Value Ref Range    POC Glucose 263 (H) 65 - 100 mg/dL    Performed by: Alfonso Caballero    CBC with Auto Differential    Collection Time: 06/08/22  5:00 AM   Result Value Ref Range    WBC 3.6 (L) 4.3 - 11.1 K/uL    RBC 3.88 (L) 4.23 - 5.6 M/uL    Hemoglobin 11.3 (L) 13.6 - 17.2 g/dL    Hematocrit 34.2 (L) 41.1 - 50.3 %    MCV 88.1 79.6 - 97.8 FL    MCH 29.1 26.1 - 32.9 PG    MCHC 33.0 31.4 - 35.0 g/dL    RDW 12.0 11.9 - 14.6 %    Platelets 295 191 - 835 K/uL    MPV 9.8 9.4 - 12.3 FL    nRBC 0.00 0.0 - 0.2 K/uL    Differential Type AUTOMATED      Seg Neutrophils 63 43 - 78 %    Lymphocytes 22 13 - 44 %    Monocytes 11 4.0 - 12.0 %    Eosinophils % 2 0.5 - 7.8 %    Basophils 1 0.0 - 2.0 %    Immature Granulocytes 1 0.0 - 5.0 %    Segs Absolute 2.3 1.7 - 8.2 K/UL    Absolute Lymph # 0.8 0.5 - 4.6 K/UL    Absolute Mono # 0.4 0.1 - 1.3 K/UL    Absolute Eos # 0.1 0.0 - 0.8 K/UL    Basophils Absolute 0.0 0.0 - 0.2 K/UL    Absolute Immature Granulocyte 0.0 0.0 - 0.5 K/UL   Basic Metabolic Panel    Collection Time: 06/08/22  5:00 AM   Result Value Ref Range    Sodium 142 138 - 145 mmol/L    Potassium 3.7 3.5 - 5.1 mmol/L    Chloride 108 (H) 98 - 107 mmol/L    CO2 29 21 - 32 mmol/L    Anion Gap 5 (L) 7 - 16 mmol/L    Glucose 188 (H) 65 - 100 mg/dL    BUN 16 8 - 23 MG/DL    CREATININE 0.70 (L) 0.8 - 1.5 MG/DL    GFR African American >60 >60 ml/min/1.73m2    GFR Non- >60 >60 ml/min/1.73m2    Calcium 8.2 (L) 8.3 - 10.4 MG/DL   POCT Glucose    Collection Time: 06/08/22  7:51 AM   Result Value Ref Range    POC Glucose 171 (H) 65 - 100 mg/dL    Performed by: Supriya    POCT Glucose    Collection Time: 06/08/22 10:45 AM   Result Value Ref Range    POC Glucose 196 (H) 65 - 100 mg/dL    Performed by: Supriya    COVID-19, Rapid    Collection Time: 06/08/22 12:30 PM    Specimen: Nasopharyngeal   Result Value Ref Range    Source Nasopharyngeal      SARS-CoV-2, Rapid Not detected NOTD     POCT Glucose    Collection Time: 06/08/22  3:56 PM   Result Value Ref Range    POC Glucose 303 (H) 65 - 100 mg/dL    Performed by: [facility]    POCT Glucose    Collection Time: 06/08/22  4:45 PM   Result Value Ref Range    POC Glucose 326 (H) 65 - 100 mg/dL    Performed by: [employer]    POCT Glucose    Collection Time: 06/08/22  8:08 PM   Result Value Ref Range    POC Glucose 229 (H) 65 - 100 mg/dL    Performed by: Eduardo    CBC with Auto Differential    Collection Time: 06/09/22  4:38 AM   Result Value Ref Range    WBC 3.4 (L) 4.3 - 11.1 K/uL    RBC 3.89 (L) 4.23 - 5.6 M/uL    Hemoglobin 11.5 (L) 13.6 - 17.2 g/dL    Hematocrit 34.9 (L) 41.1 - 50.3 %    MCV 89.7 79.6 - 97.8 FL    MCH 29.6 26.1 - 32.9 PG    MCHC 33.0 31.4 - 35.0 g/dL    RDW 12.0 11.9 - 14.6 %    Platelets 907 224 - 791 K/uL    MPV 10.0 9.4 - 12.3 FL    nRBC 0.00 0.0 - 0.2 K/uL    Differential Type AUTOMATED      Seg Neutrophils 61 43 - 78 %    Lymphocytes 23 13 - 44 %    Monocytes 12 4.0 - 12.0 %    Eosinophils % 2 0.5 - 7.8 %    Basophils 1 0.0 - 2.0 %    Immature Granulocytes 1 0.0 - 5.0 %    Segs Absolute 2.1 1.7 - 8.2 K/UL    Absolute Lymph # 0.8 0.5 - 4.6 K/UL    Absolute Mono # 0.4 0.1 - 1.3 K/UL    Absolute Eos # 0.1 0.0 - 0.8 K/UL    Basophils Absolute 0.0 0.0 - 0.2 K/UL    Absolute Immature Granulocyte 0.0 0.0 - 0.5 K/UL   Basic Metabolic Panel    Collection Time: 06/09/22  4:38 AM   Result Value Ref Range    Sodium 142 136 - 145 mmol/L    Potassium 3.8 3.5 - 5.1 mmol/L Nightly       Signed:  SALLIE Muse CNP    Part of this note may have been written by using a voice dictation software. The note has been proof read but may still contain some grammatical/other typographical errors.

## 2022-06-09 NOTE — CARE COORDINATION
Patient is medically stable for discharge. Patient to return to [address] with inhouse therapy. Therapy order placed in discharge packet. Patient to transport via [employer]. CM informed Ishaan Chavez RN with [facility] of discharge. No additional discharge/supportive care needs identified at this time. 06/06/22 1056   Service Assessment   Patient Orientation Unable to Assess   Cognition Dementia / Early Alzheimer's   History Provided By Child/Family  (Patient's Daughter [facility] [phone].)   [facility]   PCP Verified by CM Yes   Current Functional Level Assistance with the following:;Bathing;Dressing; Toileting   Ability to make needs known: Good   Social/Functional History   Type of Home Assisted living  ([address] [place] [address].)   Ambulation Assistance Needs assistance   Transfer Assistance Independent  (Prior level- Independent.)   Discharge Planning   Type of Residence Assisted living  ([address] [place] [address]. [phone])   Patient expects to be discharged to: Assisted living  ((Memory Care Unit))   Services At/After Discharge   Transition of Care Consult (CM Consult) Assisted Living  ([address] [place] [address].)   Condition of Participation: Discharge Planning   The Plan for Transition of Care is related to the following treatment goals: Return to prior level of functioning.

## 2022-06-10 ENCOUNTER — CARE COORDINATION (OUTPATIENT)
Dept: CARE COORDINATION | Facility: CLINIC | Age: 87
End: 2022-06-10

## 2022-08-18 PROCEDURE — 93294 REM INTERROG EVL PM/LDLS PM: CPT | Performed by: INTERNAL MEDICINE

## 2022-08-18 PROCEDURE — 93296 REM INTERROG EVL PM/IDS: CPT | Performed by: INTERNAL MEDICINE

## 2022-10-10 DIAGNOSIS — R00.1 SYMPTOMATIC BRADYCARDIA: Primary | ICD-10-CM

## 2022-10-10 DIAGNOSIS — Z95.0 PACEMAKER: ICD-10-CM

## 2022-10-10 DIAGNOSIS — R00.1 SYMPTOMATIC BRADYCARDIA: ICD-10-CM

## 2022-10-25 ENCOUNTER — APPOINTMENT (OUTPATIENT)
Dept: CT IMAGING | Age: 87
End: 2022-10-25
Payer: MEDICARE

## 2022-10-25 ENCOUNTER — HOSPITAL ENCOUNTER (EMERGENCY)
Age: 87
Discharge: NURSING, REHAB OR LONG-TERM CARE | End: 2022-10-25
Attending: EMERGENCY MEDICINE
Payer: MEDICARE

## 2022-10-25 VITALS
SYSTOLIC BLOOD PRESSURE: 112 MMHG | DIASTOLIC BLOOD PRESSURE: 69 MMHG | HEART RATE: 53 BPM | OXYGEN SATURATION: 96 % | HEIGHT: 67 IN | RESPIRATION RATE: 16 BRPM | BODY MASS INDEX: 22.82 KG/M2 | TEMPERATURE: 98.8 F

## 2022-10-25 DIAGNOSIS — N39.0 URINARY TRACT INFECTION WITHOUT HEMATURIA, SITE UNSPECIFIED: ICD-10-CM

## 2022-10-25 DIAGNOSIS — E86.0 DEHYDRATION: Primary | ICD-10-CM

## 2022-10-25 LAB
ALBUMIN SERPL-MCNC: 3.5 G/DL (ref 3.2–4.6)
ALBUMIN/GLOB SERPL: 1 {RATIO} (ref 0.4–1.6)
ALP SERPL-CCNC: 90 U/L (ref 45–117)
ALT SERPL-CCNC: 22 U/L (ref 13–61)
ANION GAP SERPL CALC-SCNC: 12 MMOL/L (ref 2–11)
APPEARANCE UR: CLEAR
AST SERPL-CCNC: 35 U/L (ref 15–37)
BACTERIA URNS QL MICRO: ABNORMAL /HPF
BASOPHILS # BLD: 0 K/UL (ref 0–0.2)
BASOPHILS NFR BLD: 0 % (ref 0–2)
BILIRUB SERPL-MCNC: 0.5 MG/DL (ref 0.2–1.1)
BILIRUB UR QL: NEGATIVE
BUN SERPL-MCNC: 40 MG/DL (ref 7–18)
CALCIUM SERPL-MCNC: 9.1 MG/DL (ref 8.3–10.4)
CASTS URNS QL MICRO: 0 /LPF
CHLORIDE SERPL-SCNC: 110 MMOL/L (ref 98–107)
CO2 SERPL-SCNC: 30 MMOL/L (ref 21–32)
COLOR UR: ABNORMAL
CREAT SERPL-MCNC: 1.36 MG/DL (ref 0.8–1.5)
CRYSTALS URNS QL MICRO: 0 /LPF
DIFFERENTIAL METHOD BLD: ABNORMAL
EOSINOPHIL # BLD: 0.1 K/UL (ref 0–0.8)
EOSINOPHIL NFR BLD: 1 % (ref 0.5–7.8)
EPI CELLS #/AREA URNS HPF: ABNORMAL /HPF
ERYTHROCYTE [DISTWIDTH] IN BLOOD BY AUTOMATED COUNT: 12.6 % (ref 11.9–14.6)
GLOBULIN SER CALC-MCNC: 3.5 G/DL (ref 2.8–4.5)
GLUCOSE SERPL-MCNC: 166 MG/DL (ref 65–100)
GLUCOSE UR STRIP.AUTO-MCNC: NEGATIVE MG/DL
HCT VFR BLD AUTO: 39.8 % (ref 41.1–50.3)
HGB BLD-MCNC: 13 G/DL (ref 13.6–17.2)
HGB UR QL STRIP: ABNORMAL
IMM GRANULOCYTES # BLD AUTO: 0 K/UL (ref 0–0.5)
IMM GRANULOCYTES NFR BLD AUTO: 0 % (ref 0–5)
KETONES UR QL STRIP.AUTO: 15 MG/DL
LEUKOCYTE ESTERASE UR QL STRIP.AUTO: ABNORMAL
LYMPHOCYTES # BLD: 0.9 K/UL (ref 0.5–4.6)
LYMPHOCYTES NFR BLD: 9 % (ref 13–44)
MCH RBC QN AUTO: 30.5 PG (ref 26.1–32.9)
MCHC RBC AUTO-ENTMCNC: 32.7 G/DL (ref 31.4–35)
MCV RBC AUTO: 93.4 FL (ref 82–102)
MONOCYTES # BLD: 0.6 K/UL (ref 0.1–1.3)
MONOCYTES NFR BLD: 6 % (ref 4–12)
MUCOUS THREADS URNS QL MICRO: 0 /LPF
NEUTS SEG # BLD: 8.6 K/UL (ref 1.7–8.2)
NEUTS SEG NFR BLD: 84 % (ref 43–78)
NITRITE UR QL STRIP.AUTO: NEGATIVE
NRBC # BLD: 0 K/UL (ref 0–0.2)
OTHER OBSERVATIONS: ABNORMAL
PH UR STRIP: 6 [PH] (ref 5–9)
PLATELET # BLD AUTO: 262 K/UL (ref 150–450)
PMV BLD AUTO: 10.1 FL (ref 9.4–12.3)
POTASSIUM SERPL-SCNC: 4.7 MMOL/L (ref 3.5–5.1)
PROT SERPL-MCNC: 7 G/DL (ref 6.4–8.2)
PROT UR STRIP-MCNC: 30 MG/DL
RBC # BLD AUTO: 4.26 M/UL (ref 4.23–5.6)
RBC #/AREA URNS HPF: ABNORMAL /HPF
SODIUM SERPL-SCNC: 152 MMOL/L (ref 133–143)
SP GR UR REFRACTOMETRY: 1.02 (ref 1–1.02)
UROBILINOGEN UR QL STRIP.AUTO: 0.2 EU/DL (ref 0.2–1)
WBC # BLD AUTO: 10.2 K/UL (ref 4.3–11.1)
WBC URNS QL MICRO: ABNORMAL /HPF

## 2022-10-25 PROCEDURE — 81001 URINALYSIS AUTO W/SCOPE: CPT

## 2022-10-25 PROCEDURE — 87086 URINE CULTURE/COLONY COUNT: CPT

## 2022-10-25 PROCEDURE — 6360000002 HC RX W HCPCS: Performed by: EMERGENCY MEDICINE

## 2022-10-25 PROCEDURE — 2580000003 HC RX 258: Performed by: EMERGENCY MEDICINE

## 2022-10-25 PROCEDURE — 70450 CT HEAD/BRAIN W/O DYE: CPT

## 2022-10-25 PROCEDURE — 80053 COMPREHEN METABOLIC PANEL: CPT

## 2022-10-25 PROCEDURE — 96361 HYDRATE IV INFUSION ADD-ON: CPT

## 2022-10-25 PROCEDURE — 99284 EMERGENCY DEPT VISIT MOD MDM: CPT

## 2022-10-25 PROCEDURE — 96374 THER/PROPH/DIAG INJ IV PUSH: CPT

## 2022-10-25 PROCEDURE — 85025 COMPLETE CBC W/AUTO DIFF WBC: CPT

## 2022-10-25 RX ORDER — ONDANSETRON 4 MG/1
4 TABLET, FILM COATED ORAL 3 TIMES DAILY PRN
Qty: 12 TABLET | Refills: 0 | Status: SHIPPED | OUTPATIENT
Start: 2022-10-25

## 2022-10-25 RX ORDER — ONDANSETRON 2 MG/ML
4 INJECTION INTRAMUSCULAR; INTRAVENOUS ONCE
Status: COMPLETED | OUTPATIENT
Start: 2022-10-25 | End: 2022-10-25

## 2022-10-25 RX ORDER — 0.9 % SODIUM CHLORIDE 0.9 %
1000 INTRAVENOUS SOLUTION INTRAVENOUS ONCE
Status: COMPLETED | OUTPATIENT
Start: 2022-10-25 | End: 2022-10-25

## 2022-10-25 RX ORDER — CEPHALEXIN 500 MG/1
500 CAPSULE ORAL 4 TIMES DAILY
Qty: 28 CAPSULE | Refills: 0 | Status: SHIPPED | OUTPATIENT
Start: 2022-10-25 | End: 2022-11-01

## 2022-10-25 RX ADMIN — ONDANSETRON 4 MG: 2 INJECTION INTRAMUSCULAR; INTRAVENOUS at 18:01

## 2022-10-25 RX ADMIN — SODIUM CHLORIDE 1000 ML: 9 INJECTION, SOLUTION INTRAVENOUS at 18:00

## 2022-10-25 ASSESSMENT — PAIN - FUNCTIONAL ASSESSMENT: PAIN_FUNCTIONAL_ASSESSMENT: NONE - DENIES PAIN

## 2022-10-25 NOTE — ED PROVIDER NOTES
[employer] Emergency Department Provider Note                   PCP:                SALLIE Matute NP               Age: 80 y.o. Sex: male       Nahed Parrish is a 80 y.o. male who presents to the Emergency Department with chief complaint of    Chief Complaint   Patient presents with    Fatigue    Anorexia      Patient was brought in from memory care unit by daughter because she states he has had increased fatigue and decreased appetite today. They noticed this happening a week ago and he was diagnosed with a urinary tract infection which he is finishing his last dose of Bactrim today. The daughter states that today he has not hardly ate or drank anything in the past day. Patient does not communicate at baseline due to his dementia. Patient does not ambulate and only gets around with a wheelchair. She states that today he was too weak to get into his wheelchair. The history is provided by a relative (Patient's daughter). History limited by: Patient's dementia. All other systems reviewed and are negative. Review of Systems   Unable to perform ROS: Dementia     Past Medical History:   Diagnosis Date    Diabetes ([facility]     Vascular dementia ([facility]         History reviewed. No pertinent surgical history. Family History   Problem Relation Age of Onset    No Known Problems Mother     No Known Problems Father         Social History     Socioeconomic History    Marital status:      Spouse name: None    Number of children: None    Years of education: None    Highest education level: None   Tobacco Use    Smoking status: Never    Smokeless tobacco: Never   Substance and Sexual Activity    Alcohol use: No        Allergies: Patient has no known allergies.     Discharge Medication List as of 10/25/2022  9:25 PM        CONTINUE these medications which have NOT CHANGED    Details   SITagliptin (JANUVIA) 100 MG tablet Take 1 tablet by mouth daily, Disp-30 tablet, R-0Print      ascorbic acid (VITAMIN C) 500 MG tablet Take 1 tablet by mouth daily, Disp-30 tablet, R-0Print      memantine (NAMENDA) 5 MG tablet Take 5 mg by mouth dailyHistorical Med      insulin glargine (LANTUS SOLOSTAR) 100 UNIT/ML injection pen Inject 10 Units into the skin nightly Historical Med      glipiZIDE (GLUCOTROL XL) 10 MG extended release tablet Take 10 mg by mouth dailyHistorical Med      haloperidol (HALDOL) 0.5 MG tablet Take 0.5 mg by mouth at bedtimeHistorical Med      polyethylene glycol (GLYCOLAX) 17 g packet Take 17 g by mouth daily as needed for ConstipationHistorical Med      acetaminophen (TYLENOL) 325 MG tablet Take 650 mg by mouth every 8 hoursHistorical Med      divalproex (DEPAKOTE SPRINKLE) 125 MG capsule Take 125 mg by mouth every 8 hoursHistorical Med      ferrous sulfate (IRON 325) 325 (65 Fe) MG tablet Take by mouth every morning (before breakfast)Historical Med      insulin lispro (HUMALOG) 100 UNIT/ML injection vial INITIATE INSULIN CORRECTIVE PROTOCOL:Normal Insulin Sensitivity For Blood Sugar (mg/dL) of: Less than 150 = 0 units 150 -199 = 2 [id number] -249 = 4 [id number] -299 = 6 [id number] -349 = 8 [id number] and above = 10 units and Call PhysicianInitiate Hypoglycemia p rotocol if blood glucose is <70 mg/dL Fast Acting - Administer Immediately - or within 15 minutes of start of meal, if mealtime coverage. Historical Med      lidocaine 4 % external patch Apply on intact skin on affected areas, Starting Wed 2/9/2022, Historical Med      midodrine (PROAMATINE) 5 MG tablet Take 5 mg by mouth every 8 hours as neededHistorical Med      pravastatin (PRAVACHOL) 20 MG tablet Take 20 mg by mouthHistorical Med      senna-docusate (PERICOLACE) 8.6-50 MG per tablet Take 2 tablets by mouth dailyHistorical Med              Vitals signs and nursing note reviewed.    Patient Vitals for the past 4 hrs:   Temp Pulse Resp BP SpO2   10/25/22 2134 98.8 °F (37.1 °C) 53 16 112/69 96 %          Physical Exam  Vitals and nursing note reviewed. Constitutional:       Appearance: Normal appearance. HENT:      Head: Normocephalic and atraumatic. Mouth/Throat:      Mouth: Mucous membranes are dry. Cardiovascular:      Rate and Rhythm: Normal rate and regular rhythm. Pulses: Normal pulses. Heart sounds: Normal heart sounds. Pulmonary:      Effort: Pulmonary effort is normal.      Breath sounds: Normal breath sounds. Abdominal:      General: Abdomen is flat. Bowel sounds are normal.      Palpations: Abdomen is soft. Tenderness: There is no abdominal tenderness. Musculoskeletal:         General: No swelling or tenderness. Cervical back: Normal range of motion and neck supple. No tenderness. Skin:     General: Skin is warm and dry. Neurological:      General: No focal deficit present. Mental Status: He is alert. Comments: Patient does not answer questions appropriately or follow commands. Patient does resist movements all 4 extremities with good strength.         Orders Placed This Encounter   Procedures    Culture, Urine    CT HEAD WO CONTRAST    CBC with Auto Differential    Comprehensive Metabolic Panel    Urinalysis w rflx microscopic    Urinalysis, Micro    Saline lock IV       Procedures      Results Include:    Recent Results (from the past 24 hour(s))   CBC with Auto Differential    Collection Time: 10/25/22  4:49 PM   Result Value Ref Range    WBC 10.2 4.3 - 11.1 K/uL    RBC 4.26 4.23 - 5.60 M/uL    Hemoglobin 13.0 (L) 13.6 - 17.2 g/dL    Hematocrit 39.8 (L) 41.1 - 50.3 %    MCV 93.4 82.0 - 102.0 FL    MCH 30.5 26.1 - 32.9 PG    MCHC 32.7 31.4 - 35.0 g/dL    RDW 12.6 11.9 - 14.6 %    Platelets 843 020 - 246 K/uL    MPV 10.1 9.4 - 12.3 FL    nRBC 0.00 0.0 - 0.2 K/uL    Differential Type AUTOMATED      Seg Neutrophils 84 (H) 43 - 78 %    Lymphocytes 9 (L) 13 - 44 %    Monocytes 6 4.0 - 12.0 %    Eosinophils % 1 0.5 - 7.8 %    Basophils 0 0.0 - 2.0 %    Immature Granulocytes 0 0.0 - 5.0 %    Segs Absolute 8.6 (H) 1.7 - 8.2 K/UL    Absolute Lymph # 0.9 0.5 - 4.6 K/UL    Absolute Mono # 0.6 0.1 - 1.3 K/UL    Absolute Eos # 0.1 0.0 - 0.8 K/UL    Basophils Absolute 0.0 0.0 - 0.2 K/UL    Absolute Immature Granulocyte 0.0 0.0 - 0.5 K/UL   Comprehensive Metabolic Panel    Collection Time: 10/25/22  4:49 PM   Result Value Ref Range    Sodium 152 (H) 133 - 143 mmol/L    Potassium 4.7 3.5 - 5.1 mmol/L    Chloride 110 (H) 98 - 107 mmol/L    CO2 30 21 - 32 mmol/L    Anion Gap 12 (H) 2 - 11 mmol/L    Glucose 166 (H) 65 - 100 mg/dL    BUN 40 (H) 7.0 - 18.0 MG/DL    Creatinine 1.36 0.8 - 1.5 MG/DL    Est, Glom Filt Rate 50 (L) >60 ml/min/1.73m2    Calcium 9.1 8.3 - 10.4 MG/DL    Total Bilirubin 0.5 0.2 - 1.1 MG/DL    ALT 22 13.0 - 61.0 U/L    AST 35 15 - 37 U/L    Alk Phosphatase 90 45.0 - 117.0 U/L    Total Protein 7.0 6.4 - 8.2 g/dL    Albumin 3.5 3.2 - 4.6 g/dL    Globulin 3.5 2.8 - 4.5 g/dL    Albumin/Globulin Ratio 1.0 0.4 - 1.6     Urinalysis w rflx microscopic    Collection Time: 10/25/22  6:08 PM   Result Value Ref Range    Color, UA SARIKA      Appearance CLEAR      Specific Gravity, UA 1.020 1.001 - 1.023      pH, Urine 6.0 5.0 - 9.0      Protein, UA 30 (A) NEG mg/dL    Glucose, UA Negative mg/dL    Ketones, Urine 15 (A) NEG mg/dL    Bilirubin Urine Negative NEG      Blood, Urine SMALL (A) NEG      Urobilinogen, Urine 0.2 0.2 - 1.0 EU/dL    Nitrite, Urine Negative NEG      Leukocyte Esterase, Urine SMALL (A) NEG     Urinalysis, Micro    Collection Time: 10/25/22  6:08 PM   Result Value Ref Range    WBC, UA 5-10 0 /hpf    RBC, UA 3-5 0 /hpf    Epithelial Cells UA 0-3 0 /hpf    BACTERIA, URINE 1+ (H) 0 /hpf    Casts 0 0 /lpf    Crystals 0 0 /LPF    Mucus, UA 0 0 /lpf    OTHER OBSERVATIONS RESULTS VERIFIED MANUALLY            CT HEAD WO CONTRAST   Final Result   Chronic appearing white matter change without acute intracranial   abnormality.                                ED Course as of 10/25/22 1925 When You Wish Oct 25, 2022 1930 Patient's daughter was updated on the results so far. She states that patient has perked up a little bit. We will try to get patient to drink some fluids [CW]   2047 Patient is awake now and answering questions but is confused. His daughter states this is his baseline. Patient has drank a whole glass of water. I explained the results and plan. I did discuss possible admission with the daughter due to his dehydration and anorexia but since he is drinking fluid now she is comfortable taking him home. [CW]      ED Course User Index  [CW] Osei Gold MD       MDM  Number of Diagnoses or Management Options  Dehydration  Urinary tract infection without hematuria, site unspecified  Diagnosis management comments: Patient is 55-year-old gentleman with dementia brought in by his daughter for evaluation of fatigue and anorexia. Patient has had this previously with a urinary tract infection. Patient has been treated with 1 week of Bactrim. Patient does live in a memory care unit. Patient's vital signs were stable. Patient's CBC showed normal white count. CMP shows signs of some dehydration with an elevated BUN to creatinine ratio 40 to 1.36. Patient's electrolytes are stable. Patient's anion gap is barely elevated. Patient's LFTs were all normal.  He has a nontender abdominal exam and I do not suspect any acute intra-abdominal pelvic process requiring advanced imaging to exclude. Patient was given IV fluid and his lethargy improved. Patient was alert and talking prior to discharge. Patient's urinalysis did show some signs of continued UTI. I have sent a urine culture. Patient just finished his last dose of Bactrim today. I will add Keflex to his regimen. I had a long discussion with patient's daughter. He was drinking fluids in the ED and the daughter was comfortable with him being discharged. Patient was sent home with Zofran in case he is having nausea causing his decreased appetite. Patient is to follow-up with primary care physician. Explanation:  I explained the results and plan to the patient's daughter. She was comfortable with discharge. We discussed strict return to ED precautions and she voiced understanding and compliance. ICD-10-CM    1. Dehydration  E86.0       2. Urinary tract infection without hematuria, site unspecified  N39.0           DISPOSITION Decision To Discharge 10/25/2022 09:13:07 PM       Voice dictation software was used during the making of this note. This software is not perfect and grammatical and other typographical errors may be present. This note has not been completely proofread for errors.      Fareed Clark MD  10/25/22 0602

## 2022-10-25 NOTE — ED TRIAGE NOTES
Pt arrives by EMS from [facility] assisted living for increased lethargy over last week after being dx with UTI, currently on abx and about to take last pill per staff. Also has had decreased appetite over last week. Alert to normal mentation, hx of dementia.

## 2022-10-26 NOTE — ED NOTES
I have reviewed discharge instructions with the caregiver. The caregiver verbalized understanding. Patient left ED via Discharge Method: stretcher to SNF with [facility].    Opportunity for questions and clarification provided. Patient given 2 scripts. To continue your aftercare when you leave the hospital, you may receive an automated call from our care team to check in on how you are doing. This is a free service and part of our promise to provide the best care and service to meet your aftercare needs.  If you have questions, or wish to unsubscribe from this service please call [phone]. Thank you for Choosing our [facility] Emergency Department.         Santiago Jordan RN  10/25/22 3276

## 2022-10-26 NOTE — ED NOTES
Nurse at bedside with pt's daughter. PO challenge initiated. Pt confused on task to drink water. This nurse was able to get him to take some sips of water. Pt drank about half a cup of water.       Edwin Sutton RN  10/25/22 2011

## 2022-10-28 LAB
BACTERIA SPEC CULT: NORMAL
SERVICE CMNT-IMP: NORMAL

## 2022-12-12 DIAGNOSIS — R00.1 SYMPTOMATIC BRADYCARDIA: ICD-10-CM

## 2022-12-12 DIAGNOSIS — Z95.0 PACEMAKER: ICD-10-CM

## (undated) DEVICE — Device

## (undated) DEVICE — DRAPE,U/SHT,SPLIT,FILM,60X84,STERILE: Brand: MEDLINE

## (undated) DEVICE — SUTURE ETHBND EXCEL SZ 2 L30IN NONABSORBABLE GRN L40MM V-37 MX69G

## (undated) DEVICE — SYSTEM SURG HEMSTAT PWD 1 GM POLYSACCHARIDE HEMOSPHERES

## (undated) DEVICE — BANDAGE COBAN 6 IN WND 6INX5YD FOAM

## (undated) DEVICE — GAUZE,SPONGE,4"X4",16PLY,STRL,LF,10/TRAY: Brand: MEDLINE

## (undated) DEVICE — SUTURE FIBERWIRE SZ 2 L38IN NONABSORBABLE BLU WHT BLK AR7201

## (undated) DEVICE — 7 DAY SILVER-COATED ANTIMICROBIAL BARRIER DRESSING: Brand: ACTICOAT 7  4" X 5"

## (undated) DEVICE — SUTURE V-LOC 90 3-0 L9IN ABSRB VLT L26MM V-20 1/2 CIR TAPR VLOCM0644

## (undated) DEVICE — DRAPE,HIP,W/POUCHES,STERILE: Brand: MEDLINE

## (undated) DEVICE — STRYKER PERFORMANCE SERIES SAGITTAL BLADE: Brand: STRYKER PERFORMANCE SERIES

## (undated) DEVICE — SUT ETHBND 0 18IN MO6 MP GRN --

## (undated) DEVICE — 3M™ IOBAN™ 2 ANTIMICROBIAL INCISE DRAPE 6650EZ: Brand: IOBAN™ 2

## (undated) DEVICE — SUTURE STRATAFIX SPRL SZ 3-0 L9IN ABSRB VLT FS L26MM 3/8 SXPD2B419

## (undated) DEVICE — MICROPUNCTURE INTRODUCER SET SILHOUETTE TRANSITIONLESS WITH NITINOL WIRE GUIDE: Brand: MICROPUNCTURE

## (undated) DEVICE — HOOD: Brand: FLYTE

## (undated) DEVICE — NDL SUT TAPR PT 0.5 CIR 4 NS --

## (undated) DEVICE — SUTURE ABSORBABLE BRAIDED 0 CT-1 8X27 IN UD VICRYL JJ41G

## (undated) DEVICE — REM POLYHESIVE ADULT PATIENT RETURN ELECTRODE: Brand: VALLEYLAB

## (undated) DEVICE — DERMABOND SKIN ADH 0.7ML -- DERMABOND ADVANCED 12/BX

## (undated) DEVICE — SUTURE VCRL + SZ 2-0 L18IN ABSRB UD CT1 L36MM 1/2 CIR VCP839D

## (undated) DEVICE — Z DUPLICATE USE 2275497 DRSG POSTOP PRMSL AG 3.5X6IN

## (undated) DEVICE — 18G NG KIT WITH 96IN PROBE COVER (10 PK): Brand: SITE-RITE

## (undated) DEVICE — PREP SKN CHLRAPRP APL 26ML STR --

## (undated) DEVICE — HANDPIECE SET WITH COAXIAL HIGH FLOW TIP AND SUCTION TUBE: Brand: INTERPULSE

## (undated) DEVICE — 3000CC GUARDIAN II: Brand: GUARDIAN

## (undated) DEVICE — DRAPE SHT 3 QTR PROXIMA 53X77 --

## (undated) DEVICE — INTRO PEELWY HEMVLV 6F 13CM -- SHRT PRELUDE SNAP

## (undated) DEVICE — SOL IRR SOD CL 0.9% 3000ML --

## (undated) DEVICE — HIP HEMIARTHROPLASTY: Brand: MEDLINE INDUSTRIES, INC.

## (undated) DEVICE — PAD,ABDOMINAL,5"X9",ST,LF,25/BX: Brand: MEDLINE INDUSTRIES, INC.

## (undated) DEVICE — PLASMABLADE X PS210-030S-LIGHT 3.0SL: Brand: PLASMABLADE™ X